# Patient Record
Sex: FEMALE | Race: WHITE | NOT HISPANIC OR LATINO | Employment: OTHER | URBAN - METROPOLITAN AREA
[De-identification: names, ages, dates, MRNs, and addresses within clinical notes are randomized per-mention and may not be internally consistent; named-entity substitution may affect disease eponyms.]

---

## 2017-01-03 ENCOUNTER — ALLSCRIPTS OFFICE VISIT (OUTPATIENT)
Dept: OTHER | Facility: OTHER | Age: 82
End: 2017-01-03

## 2017-03-30 ENCOUNTER — ALLSCRIPTS OFFICE VISIT (OUTPATIENT)
Dept: OTHER | Facility: OTHER | Age: 82
End: 2017-03-30

## 2017-09-13 ENCOUNTER — ALLSCRIPTS OFFICE VISIT (OUTPATIENT)
Dept: OTHER | Facility: OTHER | Age: 82
End: 2017-09-13

## 2017-10-06 ENCOUNTER — GENERIC CONVERSION - ENCOUNTER (OUTPATIENT)
Dept: OTHER | Facility: OTHER | Age: 82
End: 2017-10-06

## 2018-01-10 NOTE — PSYCH
History of Present Illness  [facility] Clinical Progress Note [facility]:   Specialized Services Documentation - Therapist must complete separate progress note for each specific clinical activity in which the client participated during the day  (611) Group Psychotherapy: (1220-7079) Catarino Gregg participated only during the introductory phase of psychotherapy group focused on coping  Although she seemed to be actively listening, she did not join in peer discussions  Inconsistent progress towards goal  continue group to offer opportunity to relate to peers around similar issues  Treatment Plan Problem(s): 1 1  Dwayne Nieves LCSW     (123) Group Psychotherapy: (6158-9120) The patient attended the group on the use of mindfulness to better handle her depression and anxiety  She was attentive and will benefit from sharing in the group process  Jackalyn Epley LCSW/[facility]  Treatment Plan Problem(s): 1 1        (564) Education Therapy Goals set - Grocery shop    Treatment Plan Problem(s): 1 1  Education Therapy Time - 0900 - 0930 Previous goal was met  Readiness to Learning:  She is receptive to learning  There are  no barriers to learning  Learning Assessment Time - 1330 - 1400   Education completed on  wellness tools  The teaching method was  demonstration  Shared area of learning: Yes  Dwayne Nieves LCSW     (866) Allied Therapy [id number] Catarino Gregg actively shared in [facility] group focused on stress management and relaxation techniques  Engaged in therapist led exercises and shared benefit  During discussion related to wellness strategies to reduce stress, patient identified benefit from relaxation exercises and ability to work on improving nutrition  Group reinforced consistent skill practice  Some effort noted toward goal  Continue AT to increase skill awareness and use  Treatment Plan Problem(s): 1 1   WERNER MillsBC       Case Management Note:   [id number] Briefly spoke with patient regarding progress  She reported feeling better since Monday, yet she struggles with feeling her children do "too much" for her and she is afraid this is holding her back  She is communicating this with them and she continues to need reassurance that she is working on her wellness  She would like to drive again and reach out to friends - encouraged to identify ways to reincorporate  TREATMENT SESSION NUMBER: 8   Current suicide risk is low  Medications not changed/added/denied  Tammy Solano MT-BC      Active Problems    1  YANCY (generalized anxiety disorder) (300 02) (F41 1)   2  Hyperlipidemia (272 4) (E78 5)   3  Hypertension (401 9) (I10)   4  Major depressive disorder, single episode, 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)    Allergies    1  Codeine Derivatives   2  Demerol TABS   3  Sulfa Drugs   4  TraMADol HCl TABS    Current Meds   1  Hydrochlorothiazide 12 5 MG Oral Capsule; TAKE 1 CAPSULE DAILY; Therapy: (Recorded:21Mar2016) to Recorded   2  KlonoPIN 0 5 MG Oral Tablet; TAKE 0 5 TABLET Twice daily; Therapy: (Deleta Terrence) to Recorded   3  Lisinopril 10 MG Oral Tablet; TAKE 1 TABLET DAILY AS DIRECTED; Therapy: (Deleta Terrence) to Recorded   4  Paxil 20 MG Oral Tablet; TAKE 1 TABLET DAILY; Therapy: (Deleta Terrence) to Recorded   5  Zocor 20 MG Oral Tablet; TAKE 1 TABLET DAILY IN THE EVENING; Therapy: (Deleta Terrence) to Recorded    Family Psych History    1  Family history of depression (V17 0) (Z81 8)    Social History    · College graduate   ·    · Never a smoker   · No history of alcohol use   · Retired    Future Appointments    Date/Time Provider Specialty Site   03/31/2016 10:45 AM ALCIRA Kirk  Psychiatry [facility] PART[facility] HOSPITAL[facility]   04/01/2016 10:45 AM ALCIRA Kirk  Psychiatry [facility]ITAL[facility]   04/07/2016 01:30 PM ALCIRA Araujo   Psychiatry [facility]   05/10/2016 12:00 PM Susi Gillette MSW, LCSW Psychiatry [facility] THERAPISTS     Signatures   Electronically signed by : FABIO Hamilton; Mar 30 2016  2:25PM EST                       (Author)    Electronically signed by : Jeff Mireles LCSW; Mar 30 2016  3:07PM EST                       (Author)    Electronically signed by : LIZBET ClancyWLCSW; Mar 30 2016  4:21PM EST                       (Author)    Electronically signed by : FABIO Hamilton; Mar 31 2016  8:33AM EST                       (Author)

## 2018-01-10 NOTE — PSYCH
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)    Current Meds   1  ClonazePAM 0 5 MG Oral Tablet (KlonoPIN); Take 1 tab po twice daily prn anxiety; Last   Rx:28Jun2016 Ordered   2  Hydrochlorothiazide 12 5 MG Oral Capsule; TAKE 1 CAPSULE DAILY; Therapy: (Macel Knee) to Recorded   3  Lisinopril 10 MG Oral Tablet; TAKE 1 TABLET DAILY AS DIRECTED; Therapy: (Macel Knee) to Recorded   4  PARoxetine HCl - 20 MG Oral Tablet (Paxil); TAKE 1 TABLET DAILY  Requested for:   07Apr2016; Last Rx:07Apr2016 Ordered   5  Zocor 20 MG Oral Tablet (Simvastatin); TAKE 1 TABLET DAILY IN THE EVENING; Therapy: (Recorded:21Mar2016) to Recorded    Allergies    1  Codeine Derivatives   2  Demerol TABS   3  Sulfa Drugs   4   TraMADol HCl TABS    Signatures   Electronically signed by : Ponce Dancer, MSWLCSWMSRADHA,REBECAW; Aug  8 2016 10:15AM EST                       (Author)

## 2018-01-10 NOTE — MISCELLANEOUS
Message  [facility] PMD Dr Bernard Brar faxed me a copy of the result of the CBC he ordered  Test samy 9/22/2017 and was significant for WBC 2 9 and absolute lymphocytes 684, otherwise normal CBC with diff  I contacted her on home phone # of record and she stated she is aware of the result from PMD who told her he would repeat the test in 3 months  She states she started Paroxetine during a hospital admission in mid March of 2016  Pt had a normal CBC 3/21/2016  I discussed that it is probably not the Paroxetine causing the reduced WBC but will be cautious and watchful  I stated that if she feels well enough to try, I can reduce the Paroxetine  Pt stated she felt well and ready for a reduction in dose  She will halve her 20mg tab daily and call if any problems  I discussed red flag signs and Sxs of low white count and what to do if those Sxs occurred--(seek medical attention right away ) She verbalized understanding and acceptance of the above  Her next medication review is sched for 1/15/2018  Plan  Major depression in remission    · From  PARoxetine HCl - 20 MG Oral Tablet TAKE 1 TABLET DAILY To  PARoxetine HCl - 20 MG Oral Tablet (Paxil) TAKE 1/2 TABLET DAILY  No new Rx    Pt was only instructed to reduce the dose    Signatures   Electronically signed by : SIMEON Jhaveri; Oct  6 2017  4:33PM EST                       (Author)

## 2018-01-11 NOTE — PSYCH
Date of Initial Treatment Plan: 5/23/16  Date of Current Treatment Plan: 5/23/16  Treatment Plan 1  Strengths/Personal Resources for Self Care: "I am a caring person  I am dependable " I am loyal to my friends  "  Diagnosis:   Axis I: F41 1, F32 0   Axis II: HTN     Current Challenges/Problems/Needs: "I am caring, very loyal to my friends  I am dependable  I try to find good in somebody "  Long Term Goals:   #1 I want to be more confident in (larger) group situations  Target Date: 9/231/6      #2 I want to resume feeling comfortable with what I enjoy doing  Target Date: 9/23/16           Short Term Objectives:   Goal 1:   A  I want to identify/review my qualities/strengths/assets  B  I want to review times I have felt confident and what I can learn about myself (strengths) from those experiences  C  I will identify/review affirmations  Target Date: 9/23/16      Goal 2:   A  I want to feel more comfortable socializing with friends  B  I want to resume participating in activities I enjoy (ex , volunteer work)  C  I want to rebuild my independence (ex , driving)  D  I want to continue to be open to learning from my experiences with my behavioral health event  Target Date: 9/23/16          GOAL 1: Modality: Individual 2 x per month Target Date: 9/23/16         GOAL 2: Modality: Individual 2 x per month Target Date: 9/23/16                The first scheduled review date is 9/23/16  The expected length of service is 12 months  Level of functioning at initial assessment: 45  The highest level of functioning in the past year was unknown  The current level of functioning is 65             Patient Signature: _________________________________ Date/Time: ______________       Electronically signed by : Daryle Bottoms, MSWLCSWMSW,LCSW; May 23 2016  6:02PM EST                       (Author)    Electronically signed by : Daryle Bottoms, MSWLCSWMSW,LCSW; May 23 2016 6:12PM EST                       (Author)

## 2018-01-11 NOTE — PSYCH
History of Present Illness  [facility] Clinical Progress Note [facility]:   Specialized Services Documentation - Therapist must complete separate progress note for each specific clinical activity in which the client participated during the day  (064) Group Psychotherapy: 5661-1622 Reggie Spatz participated in wellness group focusing on educating, and practicing assertive communication techniques and understanding the differences between aggressive, passive and assertive communication styles  Reggie Spatz shared that she was "mostly passive" in communication and shared with peers how she feels angry with one daughter-in-law and one adult grandchild who she feels takes advantage of her regarding presents  Peers discussed how patient could be more assertive with these family members to decrease resentment and anger but first she would have to realize what it was she wants from them  Pt made moderate progress toward goal  Continue group to build individual self-confidence in utilizing assertive communications in daily life  (382) Group Psychotherapy: (5850-7855) Reggie Spatz participated only during the introductory phase of psychotherapy group focused on recovery  At times in group it was difficult for this writer to ascertain if she was actively listening as her eye contact was poor  When asked if she shared a similar perception held by a peer, she said she did, but without further elaboration  Inconsistent progress towards goal  continue group to offer opportunity to relate to peers around similar issues  Treatment Plan Problem(s): 1 1  Illa Bloch, LCSW       (983) Education Therapy Goals set - make dinner for her family    Treatment Plan Problem(s): 1 2  Education Therapy Time - 0900 - 0930 Previous goal was met  Readiness to Learning:  She is receptive to learning  There are  no barriers to learning  Learning Assessment Time - 1330 - 1400   Education completed on  illness and wellness tools     The teaching method was  verbal  Shared area of learning: Yes  WERNER MillsBC     (571) Allied Therapy 3417-3319 Catarino Gregg actively shared in [facility] group exploring emotions  Engaged in task sharing triggers and responses to given emotions  She identified both productive and unproductive choices with several âupsettingâ emotions  She identified unproductive habits and was able to use driving last night as an example of turning worry into positive action  Patient took notes and was engaged  Group reinforced role in identifying emotional âhabitsâ and reactions with ability to practice productive choices and supports  Some positive progress noted toward goal  Continue AT to explore healthy emotional regulation and role in practicing wellness tools  Treatment Plan Problem(s): 1 1, 1 2  WERNER MillsBC       Case Management Note:   3724-2829 Catarino Gregg met with CM to discuss updated treatment plan  Catarino Gregg signed and received a copy of TP  Pt stated that she is trying to resume driving short distances again and yesterday drove to food store to buy some items she likes as well as ingredients to make dinner tonight for her son and daughter-in-law that she is currently living with  Pt also shared that she had a short discussion with her son that he does not have to do "everything for me " Catarino Gregg stated her son will be returning to work next week and she would like to be more active and independent, rather than dependent on him and her daughter-in-law  Pt also asked if it is "normal" to have problems remembering and focusing with depression and anxiety  Catarino Gregg stated that she is reading handouts and other [facility] materials but is still experiencing problems retaining information  Pt wanted to know "will this get better?"   TREATMENT SESSION NUMBER: 9   Current suicide risk is low  Medications not changed/added/denied  Cr Thorpe, RN      Active Problems    1  YANCY (generalized anxiety disorder) (300 02) (F41 1)   2  Hyperlipidemia (272 4) (E78 5)   3  Hypertension (401 9) (I10)   4  Major depressive disorder, single episode, 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)    Allergies    1  Codeine Derivatives   2  Demerol TABS   3  Sulfa Drugs   4  TraMADol HCl TABS    Current Meds   1  Hydrochlorothiazide 12 5 MG Oral Capsule; TAKE 1 CAPSULE DAILY; Therapy: (Recorded:21Mar2016) to Recorded   2  KlonoPIN 0 5 MG Oral Tablet; TAKE 0 5 TABLET Twice daily; Therapy: (Marcia Saleh) to Recorded   3  Lisinopril 10 MG Oral Tablet; TAKE 1 TABLET DAILY AS DIRECTED; Therapy: (Marcia Saleh) to Recorded   4  Paxil 20 MG Oral Tablet; TAKE 1 TABLET DAILY; Therapy: (Marcia Saleh) to Recorded   5  Zocor 20 MG Oral Tablet; TAKE 1 TABLET DAILY IN THE EVENING; Therapy: (Marcia Saleh) to Recorded    Family Psych History    1  Family history of depression (V17 0) (Z81 8)    Social History    · College graduate   ·    · Never a smoker   · No history of alcohol use   · Retired    Future Appointments    Date/Time Provider Specialty Site   04/01/2016 10:45 AM ALCIRA Evangelista  Psychiatry [facility] PARTIAL HOSPITALIZATION   04/07/2016 01:30 PM ALCIRA Combs  Psychiatry [facility]   05/10/2016 12:00 PM MOODY Madrid, [facility] Psychiatry [facility]     Signatures   Electronically signed by : Christa De Leon RN; Mar 31 2016  2:33PM EST                       (Author)    Electronically signed by : FABIO Ambrose; Mar 31 2016  2:45PM EST                       (Author)    Electronically signed by : Olga Villa LCSW; Mar 31 2016  2:55PM EST                       (Author)    Electronically signed by : Christa De Leon RN; Apr 4 2016  2:47PM EST                       (Author)    Electronically signed by : Christa De Leon RN;  Apr 5 2016  3:20PM EST                       (Author)

## 2018-01-11 NOTE — PSYCH
Risk Assessment    The following ratings are based on my interview(s) with initial evaluation  Risk of Harm to Self:   Demographic risk factors include , alaskan, or native [place], never  or  status and Hinduism  Recent Specific Risk Factors include: passive death wishes, feelings of guilt or self blame, recent losses: ex-, worries about finances or work and diagnosis of depression  These risk factors presented within the last few months  Risk of Harm to Others:   Access to Weapons: The patient has access to the following weapons: Pt states there are no guns in the home of her son where she now lives  Based on the above information, the client presents the following risk of harm to self or others: low  The following interventions are recommended: referral to   Notes regarding this Risk Assessment: [employer] [facility]  Active Problems    1  YANCY (generalized anxiety disorder) (300 02) (F41 1)   2  Hyperlipidemia (272 4) (E78 5)   3  Hypertension (401 9) (I10)   4  Major depressive disorder, single episode, 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)    Future Appointments    Date/Time Provider Specialty Site   03/22/2016 10:00 AM ALCIRA De La Cruz  Psychiatry [facility]   03/23/2016 10:00 AM ALCIRA De La Cruz  [facility]   03/24/2016 10:00 AM ALCIRA De La Cruz  [facility]iatry [facility]   03/25/2016 10:00 AM ALCIRA De La Cruz  [facility]   04/07/2016 01:30 PM ALCIRA Tavares   Psychiatry [facility]     Signatures   Electronically signed by : Niko Apodaca RN; Mar 21 2016  2:50PM EST                       (Author)

## 2018-01-11 NOTE — PSYCH
Progress Note  Psychotherapy Provided [facility]: Individual Psychotherapy 45 mins  minutes provided today  Goals addressed in session:   (G#2 ) "I have been busy   still working in the garden "  has been baking;  is "volunteering a lot (at [employer]);"  has been spending time with one of her grandson's prior to his plans to relocate;  has been conversing with her friends via telephone as well as via her volunteer activities; edna has prescribed Klonopin and is taking 0 25 mg  in AM and 0 25 mg  in the evening; edna takes the Paxil 20 mg  in AM and offers no complaints about her medications at this time; "I feel like my normal self " She  has been enjoying her crossword puzzles and her reading  states, "I am still having trouble with driving (other than [address] [facility] area)" which she  is working on; discussed/reviewed the factors involving her behavioral health event and the process of her achieving her current status of emotional well-being (her progress); discussed the status of her counseling with the longer range plan to keep her next ind  counseling appointment with the option to cancel if she maintains her current level of functioning; A: presents as having some insight and understanding of ways to improve/build upon her quality of life at this time (with the main change of her living arrangements; P: (G# 2) will continue with her self-care strategies (ex , socializing with friends) and contingent on her progress within the next month will determine to keep or cancel her next scheduled ind  counseling appointment;   Pain Scale and Suicide Risk [facility]: On a scale of 0 to 10, the patient rates current pain at 0   Current suicide risk is low   Behavioral Health Treatment Plan [facility]: Diagnosis and Treatment Plan explained to patient, patient relates understanding diagnosis and is agreeable to Treatment Plan  Results/Data  PHQ-9 Adult Depression Screening [id number] 11:15AM Geetha Lange     Test Name Result Flag Reference   PHQ-9 Adult Depression Score 0     Q1: 0, Q2: 0, Q3: 0, Q4: 0, Q5: 0, Q6: 0, Q7: 0, Q8: 0, Q9: 0   PHQ-9 Adult Depression Screening Negative     PHQ-9 Difficulty Level Not difficult at all     PHQ-9 Severity No Depression       PHQ-9 Adult Depression Screening [id number] 11:15AM Denny Soo Lanza pankaj     Test Name Result Flag Reference   PHQ-9 Adult Depression Score 0     Q1: 0, Q2: 0, Q3: 0, Q4: 0, Q5: 0, Q6: 0, Q7: 0, Q8: 0, Q9: 0   PHQ-9 Adult Depression Screening Negative     PHQ-9 Difficulty Level Not difficult at all     PHQ-9 Severity No Depression         Assessment    1  YANCY (generalized anxiety disorder) (300 02) (F41 1)   2   Major depressive disorder, single episode, 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

## 2018-01-11 NOTE — PSYCH
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)   2  YANCY (generalized anxiety disorder) (300 02) (F41 1)    Plan    1  From  KlonoPIN 0 5 MG Oral Tablet TAKE 0 5 TABLET Twice daily To   ClonazePAM 0 5 MG Oral Tablet (KlonoPIN) TAKE 0 5 TABLET Twice daily    2  PARoxetine HCl - 20 MG Oral Tablet (Paxil); TAKE 1 TABLET DAILY    Chief Complaint  I think this was coming for a long time  History of Present Illness  68 yr old woman who became increasingly depressed and then moved out of her [employer] rise  She became  anxious and began shopping for many people at [employer]  She inherited a lot of money when her ex    Pt had the idea to move from the [facility] to her own apartment closeby  Pt then began to feel very anxious and felt unsafe in the new apartment  Pt stayed with her son while she was waiting to move to apartment  Family doctor prescibed celexa and xanax and did not work  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    Family agreed that pt should move to the apartment to get some peace and quiet  Pt blames herself for moving to the single apartment and blames herself for making a mistake  We discussed the need for the patient to be less critical of herself and be more self-accepting  Pt was quiete nervous initially but relaxed as the interview continued  She does have mild tremor of her hands but this decreased after talking for awhile  Pt is positive and has a bright affect and congruent mood  She is alert and oriented x3 and her memory appears to be intact  Review of Systems  anxiety  Constitutional: No fever, no chills, no recent weight gain or recent weight loss  ENT: no ear ache, no loss of hearing, no nosebleeds or nasal discharge, no sore throat or hoarseness  Cardiovascular: no complaints of slow or fast heart rate, no chest pain, no palpitations, no leg claudication or lower extremity edema     Respiratory: no complaints of shortness of breath, no wheezing, no dyspnea on exertion, no orthopnea or PND  Gastrointestinal: no complaints of abdominal pain, no constipation, no nausea or diarrhea, no vomiting, no bloody stools  Genitourinary: no complaints of dysuria, no incontinence, no pelvic pain, no dysmenorrhea, no vaginal discharge or abnormal vaginal bleeding  Musculoskeletal: no complaints of arthralgia, no myalgia, no joint swelling or stiffness, no limb pain or swelling  Integumentary: no complaints of skin rash or lesion, no itching or dry skin, no skin wounds  ROS reviewed  Past Psychiatric History    Past Psychiatric History: Pt has had only one psychiatric inpt hospitalization in March of this year for 6 days on the [facility] inpt psyc unit and was seen by Dr Lennon Skiff over one week  She responded will to Paxil 20 mg po daily and Klonopin 0 25mg po bid  She is now lividn with her son and doing quite well   Following her inpt hospitalization, She attended the [facility] her and again did well  Meds were not changed  She finished the program just 2 days ago and I am seeing her in follow up  Substance Abuse Hx    Substance Abuse History: None  Active Problems    1  YANCY (generalized anxiety disorder) (300 02) (F41 1)   2  Hyperlipidemia (272 4) (E78 5)   3  Hypertension (401 9) (I10)   4  Major depressive disorder, single episode, 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)    Past Medical History    The active problems and past medical history were reviewed and updated today  Surgical History    The surgical history was reviewed and updated today  Allergies    1  Codeine Derivatives   2  Demerol TABS   3  Sulfa Drugs   4  TraMADol HCl TABS    Current Meds   1  Hydrochlorothiazide 12 5 MG Oral Capsule; TAKE 1 CAPSULE DAILY; Therapy: (Recorded:21Mar2016) to Recorded   2  KlonoPIN 0 5 MG Oral Tablet; TAKE 0 5 TABLET Twice daily; Therapy: ([facility]) to Recorded   3   Lisinopril 10 MG Oral Tablet; TAKE 1 TABLET DAILY AS DIRECTED; Therapy: ([facility]el[facility]) to Recorded   4  Paxil 20 MG Oral Tablet; TAKE 1 TABLET DAILY; Therapy: ([facility]) to Recorded   5  Zocor 20 MG Oral Tablet; TAKE 1 TABLET DAILY IN THE EVENING; Therapy: (Recorded:2016) to Recorded    The medication list was reviewed and updated today  Family Psych History    1  Family history of depression (V17 0) (Z81 8)    The family history was reviewed and updated today  Social History    · College graduate   ·    · Never a smoker   · No history of alcohol use   · Retired  The social history was reviewed and updated today  Pt finished college at [employer] and she did get a degree as an RN   Then son  the next month at age 26  she is presently living withone of her sons   Her family is very supportive  History Of Phys/Sex Abuse Or Perpetration    History Of Phys/Sex Abuse or Perpetration: none  Vitals  Signs [Data Includes: Current Encounter]   Recorded: [id number] 02:58PM   Heart Rate: 70  Respiration: 16  Systolic: 515  Diastolic: 80  Height: 5 ft 3 in  Weight: 146 lb   BMI Calculated: 25 86  BSA Calculated: 1 69    Physical Exam    Appearance: was calm and cooperative and good eye contact  Observed mood: anxious, but mood appropriate  Observed mood: affect appropriate  Speech: a normal rate  Thought processes: coherent/organized  Hallucinations: no hallucinations present  Thought Content: no delusions  Abnormal Thoughts: The patient has no suicidal thoughts and no homicidal thoughts  Orientation: The patient is oriented to person, place and time, oriented to person and oriented to place  Recent and Remote Memory: short term memory intact and long term memory intact  Attention Span And Concentration: concentration intact  Insight: Insight intact  Judgment: Her judgment was intact  Muscle Strength And Tone  Muscle strength and tone were normal  Normal gait and station  Language: no difficulty naming common objects, no difficulty repeating a phrase and no difficulty writing a sentence  Fund of knowledge: Patient displays adequate knowledge of current events, adequate fund of knowledge regarding past history and adequate fund of knowledge regarding vocabulary  The patient is experiencing no localized pain  On a scale of 0 - 10 the pain severity is a 0  Initial Evaluation provided today  Goals addressed in session: Pt will work on being less self-critical and participate in activities that are relaxing  Treatment Recommendations: continue Klonopin  0 25mg po bid and Paxil 20 mg po daily  Risks, Benefits And Possible Side Effects Of Medications: Risks, benefits, and possible side effects of medications explained to patient and patient verbalizes understanding  End of Encounter Meds    1  ClonazePAM 0 5 MG Oral Tablet (KlonoPIN); TAKE 0 5 TABLET Twice daily; Last   Rx:07Apr2016 Ordered    2  Zocor 20 MG Oral Tablet (Simvastatin); TAKE 1 TABLET DAILY IN THE EVENING; Therapy: (Juanita Lang) to Recorded    3  Hydrochlorothiazide 12 5 MG Oral Capsule; TAKE 1 CAPSULE DAILY; Therapy: ([facility]) to Recorded   4  Lisinopril 10 MG Oral Tablet; TAKE 1 TABLET DAILY AS DIRECTED; Therapy: ([facility]) to Recorded    5   PARoxetine HCl - 20 MG Oral Tablet (Paxil); TAKE 1 TABLET DAILY  Requested for:   07Apr2016; Last Rx:07Apr2016 Ordered    Future Appointments    Date/Time Provider Specialty Site   05/10/2016 12:00 PM Chencho Dupree MSW, [facility]     Signatures   Electronically signed by : ALCIRA Dozier ; Apr 7 2016  3:03PM EST                       (Author)

## 2018-01-11 NOTE — PSYCH
History of Present Illness  [facility] Clinical Progress Note [facility]:   Specialized Services Documentation - Therapist must complete separate progress note for each specific clinical activity in which the client participated during the day  (279) Group Psychotherapy: [id number] Elmon Spatz participated in wellness group focusing on educating, and practicing assertive communication techniques and understanding the differences between aggressive, passive and assertive communication styles  Elmon Spatz shared that she is more comfortable being assertive in certain life circumstances than others  Peers offered encouragement and support to pt to discuss one life circumstance where it was difficult for her to be assertive and she gave the example of communicating to her son and daughter-in-law that she wanted to do "as much as I can to remain independent as I have been because I have always been independent" despite their many attempts "help " Pt stated that she is very grateful to have such a supportive and loving family but needed to let them know that she does not want them to do things for her that she can do for herself  Pt made good progress toward goal  D/C today  Treatment Plan Problem(s): 1 1,1 2  Justin Geller RN     (957) Group Psychotherapy: ([id number]) Elmon Spatz briefly participated during psychotherapy group focused on stressors  She identified with another peer who reported experiencing forgetfulness and being overwhelmed due to stress overload  Elmon Spatz reported since she began program, she has been less anxious and has been resuming previously enjoyed activities and tasks without excessive worry  She has become aware of how certain thoughts can fuel her worry and anxiety and she has been practicing techniques to steer away from dwelling on these types of thoughts  Mod  progress towards goal  Discharge today  Treatment Plan Problem(s): 1 1   Joel Soares LCSW       (895) Education Therapy Goals set - PHQ-9 was a 13 upon admission and a 5 upon discharge    Treatment Plan Problem(s): 1 0    Education Therapy Time - 0900 - 0930 Previous goal was met  Readiness to Learning:  She is receptive to learning  There are  no barriers to learning  Learning Assessment Time - 1330 - 1400   Education completed on  illness, medication, aftercare and wellness tools  The teaching method was  verbal and written  Shared area of learning: Yes  FABIO Deng     (607) Allied Therapy [id number] Tosha Riley actively shared in [facility] group focused on barriers to communication  Engaged in tasks exploring what makes it difficult to share and strategies to improve with supports  She shared barriers that prevent people from sharing with others but spoke in vague terms  Group reinforced personal role in sharing needs and âIâ statements  Some exploration of goal explored  Discharge at the end of treatment day  Treatment Plan Problem(s): 1 1  FABIO Deng       Case Management Note:   [id number] Tosha Riley reviewed Relapse Prevention Plan, D/C Instructions, Medications List and received copies of all  CM discussed JACK HENDRICKS [facility] Outpatient Support Group for Depression with Tosha Riley and gave her a handout of information, including dates for the group  CM took Tosha Riley to waiting room where she would enter for OP visits as she stated she was anxious about seeing an OP psychiatrist and discussed how her general anxiety has lessened but she continues to fear the unknown  Pt will also follow up with OP therapist at [address] [place] [address] and has all paperwork completed to bring with her to first OP visit  D/C today  TREATMENT SESSION NUMBER: 12   Current suicide risk is low  Medications not changed/added/denied  Consuelo Foote RN   [facility] Follow-up Physician's Orders:   4/5/2016  8:31 AM Discharge Today   MD Consuelo Mathew RN      Active Problems    1  YANCY (generalized anxiety disorder) (300 02) (F41 1)   2   Hyperlipidemia (272  4) (E78 5)   3  Hypertension (401 9) (I10)   4  Major depressive disorder, single episode, 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)    Allergies    1  Codeine Derivatives   2  Demerol TABS   3  Sulfa Drugs   4  TraMADol HCl TABS    Current Meds   1  Hydrochlorothiazide 12 5 MG Oral Capsule; TAKE 1 CAPSULE DAILY; Therapy: (Recorded:21Mar2016) to Recorded   2  KlonoPIN 0 5 MG Oral Tablet; TAKE 0 5 TABLET Twice daily; Therapy: ([facility]ennifer [facility]ing) to Recorded   3  Lisinopril 10 MG Oral Tablet; TAKE 1 TABLET DAILY AS DIRECTED; Therapy: (Jennifer Schooling) to Recorded   4  Paxil 20 MG Oral Tablet; TAKE 1 TABLET DAILY; Therapy: ([facility]enn[facility]) to Recorded   5  Zocor 20 MG Oral Tablet; TAKE 1 TABLET DAILY IN THE EVENING; Therapy: ([facility]enn[facility]) to Recorded    Family Psych History    1  Family history of depression (V17 0) (Z81 8)    Social History    · College graduate   ·    · Never a smoker   · No history of alcohol use   · Retired    Future Appointments    Date/Time Provider Specialty Site   04/05/2016 10:45 AM ALCIRA Rodriguez  Psychiatry [facility]IAL H[facility]PITAL[facility]   04/07/2016 01:30 PM ALCIRA Pearce  Psychiatry [facility]   05/10/2016 12:00 PM Elyssa Estrada MSW, [facility] Psychiatry [facility]     Signatures   Electronically signed by : ALCIRA Thomson ; Apr 5 2016  8:32AM EST                       (Author)    Electronically signed by : Meenu Alcocer RN; Apr 5 2016 11:52AM EST                       (Author)    Electronically signed by : Meenu Alcocer RN; Apr 5 2016  2:27PM EST                       (Author)    Electronically signed by : FABIO Hamilton; Apr 5 2016  2:28PM EST                       (Author)    Electronically signed by : Meenu Alcocer RN;  Apr 5 2016  3:18PM EST                       (Author)    Electronically signed by : Jeff Mireles LCSW; Apr 5 2016  3:34PM EST                       (Author) Electronically signed by : Shannon Gold LCSW; Apr 5 2016  3:35PM EST                       (Author)

## 2018-01-11 NOTE — PSYCH
Psych Med Mgmt    Appearance: was calm and cooperative and good eye contact  Observed mood: mood appropriate  Observed mood: affect appropriate  Speech: a normal rate  Thought processes: coherent/organized  Hallucinations: no hallucinations present  Thought Content: no delusions  Abnormal Thoughts: The patient has no suicidal thoughts and no homicidal thoughts  Orientation: The patient is oriented to person, place and time, oriented to person and oriented to place  Recent and Remote Memory: short term memory intact and long term memory intact  Attention Span And Concentration: concentration intact  Insight: Insight intact  Judgment: Her judgment was intact  Muscle Strength And Tone  Muscle strength and tone were normal  Normal gait and station  Language: no difficulty naming common objects, no difficulty repeating a phrase and no difficulty writing a sentence  Fund of knowledge: Patient displays adequate knowledge of current events, adequate fund of knowledge regarding past history and adequate fund of knowledge regarding vocabulary  The patient is experiencing no localized pain  On a scale of 0 - 10 the pain severity is a 0  Individual Psychotherapy minutes provided today  Goals addressed in session: Pt will work on being less self-critical and participate in activities that are relaxing  Treatment Recommendations: continue Klonopin  0 25mg po bid and Paxil 20 mg po daily  Risks, Benefits And Possible Side Effects Of Medications: Risks, benefits, and possible side effects of medications explained to patient and patient verbalizes understanding  She reports normal appetite, normal energy level, no weight change and normal number of sleep hours  80 yr old female who presented with depress        Vitals  Signs [Data Includes: Current Encounter]   Recorded: [id number] 09:51AM   Heart Rate: 80  Respiration: 16  Systolic: 552  Diastolic: 80  Height: 5 ft   Weight: 146 lb 3 oz  BMI Calculated: 28 55  BSA Calculated: 1 63    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)   3  Hypertension (401 9) (I10)    Review of Systems  anxiety  Constitutional: No fever, no chills, no recent weight gain or recent weight loss  ENT: no ear ache, no loss of hearing, no nosebleeds or nasal discharge, no sore throat or hoarseness  Cardiovascular: no complaints of slow or fast heart rate, no chest pain, no palpitations, no leg claudication or lower extremity edema  Respiratory: no complaints of shortness of breath, no wheezing, no dyspnea on exertion, no orthopnea or PND  Gastrointestinal: no complaints of abdominal pain, no constipation, no nausea or diarrhea, no vomiting, no bloody stools  Genitourinary: no complaints of dysuria, no incontinence, no pelvic pain, no dysmenorrhea, no vaginal discharge or abnormal vaginal bleeding  Musculoskeletal: no complaints of arthralgia, no myalgia, no joint swelling or stiffness, no limb pain or swelling  Integumentary: no complaints of skin rash or lesion, no itching or dry skin, no skin wounds  ROS reviewed  Past Psychiatric History    Past Psychiatric History: Pt has had only one psychiatric inpt hospitalization in March of this year for 6 days on the [facility] inpt psyc unit and was seen by Dr Vielka Henley over one week  She responded will to Paxil 20 mg po daily and Klonopin 0 25mg po bid  She is now lividn with her son and doing quite well   Following her inpt hospitalization, She attended the [facility] her and again did well  Meds were not changed  She finished the program just 2 days ago and I am seeing her in follow up  Substance Abuse Hx    Substance Abuse History: None  Active Problems    1  YANCY (generalized anxiety disorder) (300 02) (F41 1)   2  Hyperlipidemia (272 4) (E78 5)   3   Hypertension (401 9) (I10)   4  Major depressive disorder, single episode, 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)    Past Medical History    The active problems and past medical history were reviewed and updated today  Surgical History    The surgical history was reviewed and updated today  Allergies    1  Codeine Derivatives   2  Demerol TABS   3  Sulfa Drugs   4  TraMADol HCl TABS    Current Meds   1  ClonazePAM 0 5 MG Oral Tablet; TAKE 0 5 TABLET Twice daily; Last Rx:2016   Ordered   2  Hydrochlorothiazide 12 5 MG Oral Capsule; TAKE 1 CAPSULE DAILY; Therapy: ([facility]) to Recorded   3  Lisinopril 10 MG Oral Tablet; TAKE 1 TABLET DAILY AS DIRECTED; Therapy: ([facility]) to Recorded   4  PARoxetine HCl - 20 MG Oral Tablet; TAKE 1 TABLET DAILY  Requested for: [id number];   Last Rx:2016 Ordered   5  Zocor 20 MG Oral Tablet; TAKE 1 TABLET DAILY IN THE EVENING; Therapy: (Recorded:2016) to Recorded    The medication list was reviewed and updated today  Family Psych History  Mother    1  Family history of depression (V17 0) (Z81 8)    The family history was reviewed and updated today  Social History    · College graduate   ·    · Never a smoker   · No history of alcohol use   · Retired  The social history was reviewed and updated today  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   Then son  the next month at age 26  she is presently living withone of her sons   Her family is very supportive  History Of Phys/Sex Abuse Or Perpetration    History Of Phys/Sex Abuse or Perpetration: none  End of Encounter Meds    1  ClonazePAM 0 5 MG Oral Tablet (KlonoPIN); TAKE 0 5 TABLET Twice daily; Last   Rx:2016 Ordered    2  Zocor 20 MG Oral Tablet (Simvastatin); TAKE 1 TABLET DAILY IN THE EVENING; Therapy: ([facility]) to Recorded    3  Hydrochlorothiazide 12 5 MG Oral Capsule; TAKE 1 CAPSULE DAILY; Therapy: ([facility]) to Recorded   4  Lisinopril 10 MG Oral Tablet; TAKE 1 TABLET DAILY AS DIRECTED; Therapy: (Andrade Rojo) to Recorded    5  PARoxetine HCl - 20 MG Oral Tablet (Paxil); TAKE 1 TABLET DAILY  Requested for:   07Apr2016; Last Rx:07Apr2016 Ordered    Future Appointments    Date/Time Provider Specialty Site   05/10/2016 12:00 PM [facility]     Chief Complaint  I think this was coming for a long time        Signatures   Electronically signed by : ALCIRA Adhikari ; Apr 28 2016 10:05AM EST                       (Author)

## 2018-01-12 NOTE — PSYCH
History of Present Illness    Pre-morbid level of function and History of Present Illness:    reports had had anxiety and depression and had decided to relocate to an apt  following a 14 yr  stay in sr  housing; following an 11-day stay at her apt  was admitted to [facility]; "I never had anything like this (depression/anxiety) before " She acknowledged, "I did too much (in the building with the residents)  " states her daus -in-law noticed "You were different at [place] time " states was not sleeping well among other reported symptoms;    Reason for evaluation and partial hospitalization as an alternative to inpatient hospitalization: N/A  Previous Psychiatric/psychological treatment/year: [facility] 3/11/16-3/17/16; [facility]  Outpatient and/or Partial and Other [employer] Used (CTT, ICM, VNA): Outpatient:   Family Constellation (include parents, relationship with each and pertinent Psych/Medical History): Mother: dec'd- yrs  ago: "I liked her  She  suddenly "    Spouse:  since ;  on 2/14/15   Father: dec'd-1983; "I liked my dad  He was very quiet  He loved to read "    Children: son-age 59-"good;"   Children: son-age 62   Other: son-age 54; The patient relates best to two daughters-in-law; sons;  She lives with lives with youngest son, Dennis Cordova  She does not live alone  Domestic Violence: No past history of domestic violence  The patient is not currently experiencing domestic violence  There is not suspected domestic violence  There is no history of child abuse  There is no history of sexual abuse  Additional Comments related to family/relationships/peer support: following her discharge from [address] [place] [address] went [place] of [place];" states is enjoying her current living arrangements; "I have my own space " [address] [facility]; "I have marvelous sons "     School or Work History (strengths/limitations/needs: earned an education degree-bachelors'; substituted; 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 [employer]; volunteers at [employer] and would like to resume her volunteer work;    Her highest grade level achieved was  four years of college   history includes none reported;  Financial status includes no financial stressors reported  LEISURE ASSESSMENT (Include past and present hobbies/interests and level of involvement (Ex: Group/Club Affiliations): reading; crossword puzzles; computer scrabble; walking solo; gardening (veggies and flowers); play cards;  Her primary language is  [place]  Preferred language is [place]  Ethnic considerations are ;  Religions affiliations and level of involvement - Liliana   Spirituality and sparkle have helped her cope with difficult situations in her life  FUNCTIONAL STATUS: There has been a recent change in the patient's ability to do the following:  She does not need [employer]  The patient learns best by  reading, listening and pictures  SUBSTANCE ABUSE ASSESSMENT: no current substance abuse and no past substance abuse  No previous detox/rehab treatment  HEALTH ASSESSMENT: She has not lost 10 lbs or more in the last 6 months without trying  She has not had decreased appetite for 5 days or more  She has gained 10 lbs or more in the last 6 months without trying  no nausea  no vomiting  no diarrhea  no referral to PCP needed  no referral to nutritionist needed  4 lbs  more recently; ave  145 lbs ;  no pregnancy  She is not receiving prenatal care  not referred to PCP  PCP notified  LEGAL: No Mental Health Advance Directive or Power of  on file  She does not want an information packet about Mental Health Advance Directives  Prenatal History: n/a  Delivery History: n/a  Developmental Milestones: n/a  Temperament as a teenager was n/a       The following ratings are based on my observation of this patient over the last interview  Risk of Harm to Self:   Demographic risk factors include , alaskan, or native [place]  Historical Risk Factors include: in March, 2016, reported SI, briefly;  Recent Specific Risk Factors include: diagnosis of depression  These risk factors presented within the last few months  Risk of Harm to Others:   Based on the above information, the client presents the following risk of harm to self or others: low  The following interventions are recommended: no intervention changes  Notes regarding this Risk Assessment: reports no access to weapons;       Review of Systems  depression and emotional problems/concerns, but no anxiety, no euphoria, no emotional lability, no hostility, not suidical, no compulsive behavior, no impulsive behavior, no unusual behavior, no violent behavior, no disturbing or unusual thoughts, feelings, or sensations, no unreasonable or irrational fears, no magical thinking, not having fantasies, no interpersonal relationship problems, no sleep disturbances, normal functioning ability, no personality change and no character deficiency  Constitutional: recent 4 bs  lb weight gain, but no fever, not feeling poorly, no chills, not feeling tired and no recent weight loss  Eyes: no eye pain, no eyesight problems, no dryness of the eyes, eyes not red, no purulent discharge from the eyes and no itching of the eyes  ENT: no earache, no nosebleeds, no sore throat, no hearing loss, no nasal discharge and no hoarseness  Cardiovascular: the heart rate was not slow, no chest pain, no intermittent leg claudication, the heart rate was not fast, no palpitations and no lower extremity edema  Respiratory: no shortness of breath, no cough, no orthopnea, no wheezing, no shortness of breath during exertion and no PND  Gastrointestinal: no abdominal pain, no nausea, no vomiting, no constipation, no diarrhea and no blood in stools     Genitourinary: no dysuria, no pelvic pain, no vaginal discharge, no incontinence, no dysmenorrhea and no unexplained vaginal bleeding  Musculoskeletal: no arthralgias, no joint swelling, no limb pain, no myalgias, no joint stiffness and no limb swelling  Integumentary: no rashes, no itching, no breast pain, no skin lesions, no skin wound and no breast lump  Neurological: no headache, no numbness, no tingling, no confusion, no dizziness, no limb weakness, no convulsions, no fainting and no difficulty walking  Psychiatric: depression and emotional problems, but not suicidal, no anxiety, no personality change and no sleep disturbances  Endocrine: no proptosis, no muscle weakness, no hot flashes and no deepening of the voice  no feelings of weakness   Hematologic/Lymphatic: no swollen glands, no tendency for easy bleeding, no tendency for easy bruising and no swollen glands in the neck  ROS reviewed  Active Problems    1  YANCY (generalized anxiety disorder) (300 02) (F41 1)   2  Hyperlipidemia (272 4) (E78 5)   3  Hypertension (401 9) (I10)   4  Major depressive disorder, single episode, 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)    Past Medical History    The active problems and past medical history were reviewed and updated today  Past Psychiatric History    Past Psychiatric History: no previous behavioral health issues reported; states mother experienced depression (one month stay b h  inpt ) following open heart surgery;  Surgical History    The surgical history was reviewed and updated today  Family Psych History  Mother    1  Family history of depression (V17 0) (Z81 8)    The family history was reviewed and updated today  Substance Abuse Hx    Substance Abuse History: reports no hx or current concerns with substance abuse; reports no family hx of substance abuse;            Social History    · College graduate   ·    · Never a smoker   · No history of alcohol use   · Retired  The social history was reviewed and updated today  The social history was reviewed and is unchanged  Current Meds   1  ClonazePAM 0 5 MG Oral Tablet; TAKE 0 5 TABLET Twice daily; Last Rx:07Apr2016   Ordered   2  Hydrochlorothiazide 12 5 MG Oral Capsule; TAKE 1 CAPSULE DAILY; Therapy: (Ulis Cage) to Recorded   3  Lisinopril 10 MG Oral Tablet; TAKE 1 TABLET DAILY AS DIRECTED; Therapy: (Ulis Cage) to Recorded   4  PARoxetine HCl - 20 MG Oral Tablet; TAKE 1 TABLET DAILY  Requested for: [id number];   Last Rx:07Apr2016 Ordered   5  Zocor 20 MG Oral Tablet; TAKE 1 TABLET DAILY IN THE EVENING; Therapy: (Recorded:21Mar2016) to Recorded    The medication list was reviewed and updated today  Allergies    1  Codeine Derivatives   2  Demerol TABS   3  Sulfa Drugs   4  TraMADol HCl TABS    Physical Exam    Appearance: was calm and cooperative, adequate hygiene and grooming and good eye contact  Observed mood: depressed, but mood appropriate  Observed mood: affect appropriate  Speech: a normal rate  Thought processes: coherent/organized  Hallucinations: no hallucinations present  Thought Content: no delusions  Abnormal Thoughts: The patient has no suicidal thoughts  Orientation: The patient is oriented to person, place and time  Recent and Remote Memory: short term memory intact and long term memory intact  Attention Span And Concentration: concentration intact  Judgment: Her judgment was intact  On a scale of 0 - 10 the pain severity is a 0  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)    Future Appointments    Date/Time Provider Specialty Site   06/28/2016 09:00 AM ALCIRA Kendrick   Psychiatry [facility]   05/23/2016 05:00 PM MOODY Sparrow, [facility] Psychiatry [facility]   07/18/2016 11:00 AM MOODY Sparrow, [facility]   07/28/2016 02:00 PM MOODY Cruz LCSW Psychiatry [facility]   08/10/2016 09:00 AM MOODY Cruz, [facility] Psychiatry [facility]     Signatures   Electronically signed by : Daryle Bottoms, MSWLCSWMSW,LCSW; May 10 2016  1:24PM EST                       (Author)    Electronically signed by : ALCIRA Hubbard ; May 17 2016  6:19PM EST                       (Author)

## 2018-01-12 NOTE — PSYCH
Psych Med Mgmt    Appearance: was calm and cooperative, adequate hygiene and grooming and good eye contact  Observed mood: euthymic, but mood appropriate  Observed mood: affect was broad, but affect appropriate  Speech: a normal rate and fluent   Normal rate and volume  Thought processes: coherent/organized  Hallucinations: no hallucinations present  Thought Content: no delusions  Abnormal Thoughts: The patient has no suicidal thoughts and no homicidal thoughts  Orientation: The patient is oriented to person, place and time  Recent and Remote Memory: short term memory intact and long term memory intact  Attention Span And Concentration: concentration intact  Insight: Insight intact  Judgment: Her judgment was intact  Muscle Strength And Tone  Normal gait and station  Language: no difficulty naming common objects and no difficulty repeating a phrase  Fund of knowledge: Patient displays adequate knowledge of current events, adequate fund of knowledge regarding past history and adequate fund of knowledge regarding vocabulary  The patient is experiencing no localized pain  Treatment Recommendations: Pt is doing well and ready for a decrease in Clonazepam  I will otherwise keep the Paroxetine the same and advised adequate hydration and exercise to the extent allowable by PMD   Reduce Clonazepam to 0 5mg (1/2) tab po qd # 15 R2  Paroxetine 20mg (1) tab po qd # 90  F/U Ophthalmology for Lt eye cataract  Return 3 months, sooner prn  Risks, Benefits And Possible Side Effects Of Medications: Risks, benefits, and possible side effects of medications explained to patient and patient verbalizes understanding  Discussed with patient the risks of sedation, respiratory depression, impairment of ability to drive and potential for abuse and addiction related to treatment with benzodiazepine medications   The patient understands risk of treatment with benzodiazepine medications, agrees to not drive if feels impaired and agrees to take medications as prescribed  The patient has been filling controlled prescriptions on time as prescribed to [email or web address] [facility] program     She reports normal appetite, normal energy level, no weight change and normal number of sleep hours  Unk Krabbe is a 81 y/o female here for medication review with primary report "I've been good " She presently denies any mood or anxiety Sxs  The lesion on her nose was confirmed as BCC and removed by Mohs surgery in 11/2016  She will have Lt eye cataract surgery scheduled for within the 6 months (It was postponed due to the Mohs Surgery and f/u )  Pt reports compliance to her psychiatric medications  She feels the Clonazepam is less needed so I will reduce this  Pt enjoyed her holidays and continues volunteerism at [employer]  Pt drove herself to this appt  Vitals  Signs   Recorded: 22MRS1996 11:19AM   Heart Rate: 64  Systolic: 481, LUE, Sitting  Diastolic: 83, LUE, Sitting  Height: 5 ft   Weight: 161 lb   BMI Calculated: 31 44  BSA Calculated: 1 7    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)   2  YANCY (generalized anxiety disorder) (300 02) (F41 1)    Plan    1  From  ClonazePAM 0 5 MG Oral Tablet Take 1/2 tab po twice daily for anxiety To   ClonazePAM 0 5 MG Oral Tablet (KlonoPIN) Take 1/2 tab po once daily for anxiety    2  PARoxetine HCl - 20 MG Oral Tablet (Paxil); TAKE 1 TABLET DAILY    Review of Systems    Constitutional: No fever, no chills, feels well, no tiredness, no recent weight gain or loss  Cardiovascular: no complaints of slow or fast heart rate, no chest pain, no palpitations  Respiratory: no complaints of shortness of breath, no wheezing, no dyspnea on exertion  Gastrointestinal: constipation and Slight constipation per Pt  Genitourinary: no complaints of dysuria, no incontinence, no pelvic pain, no urinary frequency     Musculoskeletal: no complaints of arthralgia, no myalgias, no limb pain, no joint stiffness  Integumentary: no complaints of skin rash, no itching, no dry skin  Neurological: no complaints of headache, no confusion, no numbness, no dizziness  Past Psychiatric History    Past Psychiatric History: Pt was first diagnosed with a psychiatric illness (depression and anxiety) during an admission to the [place] [address] for severe Sxs 3/11/2016 - 3/17/2016  Sxs were sad mood, anhedonia, reduced appetite, insomnia, hopelessness, and passive death wish but no intent or plan of suicide  Sxs started around the holidays of 2015 and became severe within 2 months of admission when Pt moved to a new apartment  Pt also had Sxs of anxiety for at least 5-6 weeks prior to admission with worry over moving primarily  She realized her financial worries were unfounded  She moved back in with one of her sons after discharge  After discharge, Pt was [facility] in the [facility] from 3/21/2016 - 4/5/2016  She continued the same discharge medicines, Paxil and Clonazepam until outpatient f/u  She was first seen for outpatient eval by Dr Emilee Aguayo 4/7/2016  She started seeing Mrs Williamson in 5/10/2016 for psychotherapy and it was stopped by mutual decision after 7/18/2016 visit  No other hospitalizations  She denies any h/o suicide attempts, self-mutilating/other self harm behaviors, violent behavior, ECT, milana legal or  Hx  Substance Abuse Hx    Substance Abuse History: Pt denies any h/o ETOH or illicit drug use or abuse  Active Problems    1  YANCY (generalized anxiety disorder) (300 02) (F41 1)   2  Hyperlipidemia (272 4) (E78 5)   3  Hypertension (401 9) (I10)   4  Major depressive disorder, single episode, 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)    Past Medical History    1  Hypertension (401 9) (I10)    The active problems and past medical history were reviewed and updated today  Allergies    1   Codeine Derivatives   2  Demerol TABS   3  Sulfa Drugs   4  TraMADol HCl TABS    Current Meds   1  ClonazePAM 0 5 MG Oral Tablet; Take 1/2 tab po twice daily for anxiety; Last   Rx:11Oct2016 Ordered   2  HydroCHLOROthiazide 12 5 MG Oral Capsule; TAKE 1 CAPSULE DAILY; Therapy: (Ulis Cage) to Recorded   3  Lisinopril 10 MG Oral Tablet; TAKE 1 TABLET DAILY AS DIRECTED; Therapy: (Ulis Cage) to Recorded   4  PARoxetine HCl - 20 MG Oral Tablet; TAKE 1 TABLET DAILY  Requested for: [id number];   Last Rx:07Apr2016 Ordered   5  Zocor 20 MG Oral Tablet; TAKE 1 TABLET DAILY IN THE EVENING; Therapy: (Recorded:21Mar2016) to Recorded    The medication list was reviewed and updated today  Family Psych History  Mother    1  Family history of depression (V17 0) (Z81 8)    The family history was reviewed and updated today  Social History    · Jew activities   · [employer] graduate   ·    · Home   · Loss or death of child (V61 07) (Z63 4)   · Never a smoker   · No history of alcohol use   · Number of children   · Recreational activities   · Retired   · Volunteer work  The social history was reviewed and updated today  The social history was reviewed and is unchanged  No change as of 1/3/2017      History Of Phys/Sex Abuse Or Perpetration    History Of Phys/Sex Abuse or Perpetration: Pt denies any h/o childhood physical or sexual abuse  Education    Pt denies any h/o learning disabilities and reached childhood milestones on time as far as she knows  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[place] [address]    1  ClonazePAM 0 5 MG Oral Tablet (KlonoPIN); Take 1/2 tab po once daily for anxiety; Last   Rx:03Jan2017 Ordered    2  Zocor 20 MG Oral Tablet (Simvastatin); TAKE 1 TABLET DAILY IN THE EVENING; Therapy: (Ulis Cage) to Recorded    3   HydroCHLOROthiazide 12 5 MG Oral Capsule; TAKE 1 CAPSULE DAILY; Therapy: (Dallas Brill) to Recorded   4  Lisinopril 10 MG Oral Tablet; TAKE 1 TABLET DAILY AS DIRECTED; Therapy: (Dallas Brill) to Recorded    5   PARoxetine HCl - 20 MG Oral Tablet (Paxil); TAKE 1 TABLET DAILY  Requested for:   [id number]; Last PW:[id number] Ordered    Future Appointments    Date/Time Provider Specialty Site   03/30/2017 09:30 AM SIMEON Gunn Psychiatry [facility] 81     Signatures   Electronically signed by : SIMEON King; Og  3 2017 11:26AM EST                       (Author)    Electronically signed by : ALCIRA Boles ; Og  3 2017 11:53AM EST                       (Author)

## 2018-01-12 NOTE — PSYCH
History of Present Illness  [facility] Clinical Progress Note [facility]:   Specialized Services Documentation - Therapist must complete separate progress note for each specific clinical activity in which the client participated during the day  (812) Group Psychotherapy: ([id number]) Gómez Ybarra participated during psychotherapy group focused on coping  Gómez Ybarra shared she continues to struggle with self deprecating thoughts and worry  She shared she tends to pose questions to herself which promote unnecessary worry  Discussed importance of getting better at determining rational vs  irrational thoughts and to refute irrational thoughts  Peers were supportive  Some progress towards goal  Continue group to offer opportunity to relate to peers around similar issues  Treatment Plan Problem(s): 1 1  Rey Rasmussen LCSW     (001) Group Psychotherapy: [id number] Sapphire Castro participated in wellness group focusing on completing a Crisis Information Card to record relevant information such as medications, allergies, three activities to divert attention when having negative thoughts, three supportive individuals to contact in a crisis and phone numbers for [facility], MD after hours for [facility], county crisis and/or to present to emergency room  Pt shared that she gets very anxious that she cannot "think straight" and "says all kinds of things" and becomes "afraid that I am going to make a mistake " Pt was able to identify S/S that preceded her hospitalization and stated that her challenge is to do the things that will stop the progression of anxiety from building and she is not able to do this yet  Pt made mild progress toward goal  Continue group to educate patient on functional coping strategies to utilize in a mental health crisis  Treatment Plan Problem(s): 1 1  Gonsalo Paredes RN       (975) Education Therapy Goals set - review handouts from treatment day    Treatment Plan Problem(s): 1 1     Education Therapy Time - 0900 - 0917 Previous goal was met  Readiness to Learning:  She is receptive to learning  There are  no barriers to learning  Learning Assessment Time - 1330 - 1400   Education completed on  illness and wellness tools  The teaching method was  verbal  Shared area of learning: Yes  FABIO Lopez     (829) Allied Therapy 8360-1785 Barbermarcello Ybarra actively shared in [facility] group focused on self- care  Group explored definition of self-care versus selfishness and explored barriers to self-care  She was attentive as peers shared barriers to self -care  She was quiet yet took notes and appeared engaged  She did share how self-care has changed for her as her children have grown and she had been more independent  Mindfulness exercise explored  Group reinforced the necessity of self -care in wellness versus seeing this as a luxury  Some effort toward goal noted  Continue AT to engage in the development and practice of wellness tools  Treatment Plan Problem(s): 1 1  FABIO Lopez       Case Management Note: Individual Case Management visit not provided today  TREATMENT SESSION NUMBER: 7   Current suicide risk is low  Medications not changed/added/denied  Gonsalo Paredes RN      Active Problems    1  YANCY (generalized anxiety disorder) (300 02) (F41 1)   2  Hyperlipidemia (272 4) (E78 5)   3  Hypertension (401 9) (I10)   4  Major depressive disorder, single episode, 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)    Allergies    1  Codeine Derivatives   2  Demerol TABS   3  Sulfa Drugs   4  TraMADol HCl TABS    Current Meds   1  Hydrochlorothiazide 12 5 MG Oral Capsule; TAKE 1 CAPSULE DAILY; Therapy: (Recorded:21Mar2016) to Recorded   2  KlonoPIN 0 5 MG Oral Tablet; TAKE 0 5 TABLET Twice daily; Therapy: (Fayette Lesches) to Recorded   3  Lisinopril 10 MG Oral Tablet; TAKE 1 TABLET DAILY AS DIRECTED; Therapy: (Fayette Lesches) to Recorded   4  Paxil 20 MG Oral Tablet; TAKE 1 TABLET DAILY;    Therapy: (Recorded:21Mar2016) to Recorded 5  Zocor 20 MG Oral Tablet; TAKE 1 TABLET DAILY IN THE EVENING; Therapy: (Green Letitia) to Recorded    Family Psych History    1  Family history of depression (V17 0) (Z81 8)    Social History    · College graduate   ·    · Never a smoker   · No history of alcohol use   · Retired    Future Appointments    Date/Time Provider Specialty Site   03/30/2016 11:00 AM ALCIRA Vale  Psychiatry [facility]   03/31/2016 10:45 AM ALCIRA Vale  Psychiatry [facility]   04/01/2016 10:45 AM ALCIRA Vale  Psychiatry [facility]   04/07/2016 01:30 PM ALCIRA Pittman  Psychiatry [facility]   05/10/2016 12:00 PM MOODY Marino, [facility]     Signatures   Electronically signed by : Anuradha Cruz LCSW; Mar 29 2016  1:08PM EST                       (Author)    Electronically signed by : FABIO Voss; Mar 29 2016  2:46PM EST                       (Author)    Electronically signed by : Kell Mcarthur RN; Mar 29 2016  3:18PM EST                       (Author)    Electronically signed by : Kell Mcarthur RN; Mar 29 2016  4:06PM EST                       (Author)    Electronically signed by : Kell Mcarthur RN;  Apr 5 2016  3:22PM EST                       (Author)

## 2018-01-12 NOTE — PSYCH
[facility] Treatment Plan    [facility] Treatment Plan   CHALLENGES/PROBLEMS/NEEDS: Depression, anxiety related to a recent move from [facility], where she has lived for 14 years, out to her own apt and pt was unable to live there independently  Freddie Brown is now staying with adult son and his wife  Pt would like to live independently again  Pt relates feeling overwhelmed, confused why she made this abrupt move  Pt relates feeling guilty and badly for imposing on adult children  Pt feels shame over this first Iinpatient hospitalization for [facility] with no history of prior treatment in or outpatient  Date Initiated: 3/21/16     LONG TERM GOAL: 1 0 I will verbalize hopeful and positive statements regarding the future  Date Initiated: 3/21/16   Target Date: 4/5/16   Completion Date: 4/29/16     Date Initiated: 3/21/16   Revision Date: 3/31/16   1 1 I will identify two positive coping mechanisms that I can use when I am feeling overwhelmed  Target Date: 3/31/16   Completion Date: 4/5/16   Date Initiated: 3/21/16   Revision Date: 3/31/16   1 2 I will work on my WRAP and identify two feelings I have about living independently and two feelings I have living with my son and daughter-in-law  Target Date: 3/31/16   Completion Date: 4/5/16   Date Initiated: 3/21/16   Revision Date: 3/31/16   1 3 I will take my medication as prescribed and share any questions/concerns related to any of my medications as/if they arise  Target Date: 3/31/16   Completion Date: 4/5/16   Date Initiated: 3/21/16   Revision Date: 3/31/16   1 4 I will identify supports, including family and non-family members, who will help me in my recovery and state how each one will be helpful to me  Target Date: 3/31/16   Completion Date: 4/5/16          7 DAY REVISION: 1 1,1 2,1 3,1 4 Continue goals as still applicable but unmet   EMT   Date Initiated:     Target Date: 4/11/16   Completion Date: 4/5/16                 PSYCHIATRY: Medication management and education  Continue medication management and education  Date Initiated: 3/21/16   Revision Date: 3/31/2016     NURSIN 1,1 2,1 3,1 4 Provide wellness group to educate pt on S/S of depression and anxiety and medications used in treatment  EMT  1 1,1 2,1 3,1 4 Continue to provide wellness group to educate pt on S/S of depression, anxiety and medications used in treatment  EMT   Date Initiated:    Revision Date: 3/31/16       PSYCHOLOGY: 1 1,1 2 Group therapy 5x/week to offer opportunity to identify and discuss issues and coping  MHW  1 1,1 2 Group therapy 5x/week to offer opportunity to relate to peers around similar issues  MHW     Date Initiated: 3/21/16   Revision Date: 3/31/16     ALLIED THERAPY: 1 1, 1 2 AT daily to address the development and use of wellness tools to reduce symptoms and support recovery meaningful activity  SMM  1 1, 1 2 Continue AT to encourage the practice of learned skills to encourage her wellness through healthy tasks  SMM     Date Initiated: 3-21-16   Revision Date: 3/31/16           CASE MANAGEMENT: 1 0 Meet with pt three to four times weekly to assess treatment progress, D/C planning, UR and support growth/connection  EMT  2 0 Meet with pt 3-4 times weekly to assess treatment progress, D/C planning, UR and supports  EMT   Date Initiated:    Revision Date: 3/31/16         DISCHARGE CRITERIA: Stabilize symptoms and completion of Relapse Prevention Plan     DISCHARGE PLAN: OP Providers       Estimated Length of Stay: 7-10 days     Strengths: Pt has good support from adult children that live locally     Limitations: Pt states this is her first episode of depression and anxiety  Diagnosis and Treatment Plan explained to patient, patient relates understanding diagnosis and is agreeable to Treatment Plan                   Patient Signature: _________________________________ Date/Time: ______________            Signatures   Electronically signed by : FABIO Luna; Mar 21 2016 2:37PM EST                       (Author)    Electronically signed by : ALCIRA Richards ; Mar 21 2016  3:12PM EST                       (Author)    Electronically signed by : Annette Espinal LCSW; Mar 21 2016  3:52PM EST                       (Author)    Electronically signed by : Abran Hendrix RN; Mar 21 2016  4:48PM EST                       (Author)    Electronically signed by : Abran Hendrix RN; Mar 31 2016  9:29AM EST                       (Author)    Electronically signed by : ALCIRA Richards ; Mar 31 2016 10:17AM EST                       (Author)    Electronically signed by : Annette Espinal LCSW; Mar 31 2016 11:06AM EST                       (Author)    Electronically signed by : FABIO Whittington; Apr 1 2016  8:11AM EST                       (Author)    Electronically signed by : Abran Hendrix RN; Apr 4 2016  9:11AM EST                       (Author)    Electronically signed by : Abran Hendrix RN;  Apr 5 2016  4:01PM EST                       (Author)

## 2018-01-12 NOTE — PSYCH
Psych Med Mgmt    Appearance: was calm and cooperative, adequate hygiene and grooming and good eye contact  Observed mood: euthymic, but mood appropriate  Observed mood: affect was broad, but affect appropriate  Speech: a normal rate, speech soft and fluent  Hallucinations: no hallucinations present  Thought Content: no delusions  Abnormal Thoughts: The patient has no suicidal thoughts and no homicidal thoughts  Orientation: The patient is oriented to person, place and time  Recent and Remote Memory: short term memory intact and long term memory intact  Attention Span And Concentration: concentration intact  Insight: Insight intact  Judgment: Her judgment was intact  Muscle Strength And Tone  Normal gait and station  Language: no difficulty naming common objects, no difficulty repeating a phrase and no difficulty writing a sentence  Fund of knowledge: Patient displays adequate knowledge of current events, adequate fund of knowledge regarding past history and adequate fund of knowledge regarding vocabulary  The patient is experiencing no localized pain  Treatment Recommendations: See plan  Risks, Benefits And Possible Side Effects Of Medications: Risks, benefits, and possible side effects of medications explained to patient and patient verbalizes understanding  She reports normal appetite, normal energy level, no weight change and normal number of sleep hours  Giuliana Solano is a 69y/o white female with h/o MDD and YANCY, here for medication review with a general statement that her medications are working  [place] Pt is happy, enjoying living with son and his wife  She is active in her Confucianist community, volunteers at [id number] [facility], and has an active social life with friends  She enjoys shopping  Pt presently denies depression, SI, HI, anxiety, panic attacks, or manic or psychotic Sxs   Pt feels her medicines are working well without SE and would like them continued unchanged  She states Dr Magaly Sanchez had most recently reduced her Clonazepam (was a paper Rx and Pt states she is taking 1/2 of a 0 5mg tab steadily BID)  Results/Data  PHQ-9 Adult Depression Screening [id number] 11:15AM Gerald Saldaña     Test Name Result Flag Reference   PHQ-9 Adult Depression Score 0     Q1: 0, Q2: 0, Q3: 0, Q4: 0, Q5: 0, Q6: 0, Q7: 0, Q8: 0, Q9: 0   PHQ-9 Adult Depression Screening Negative     PHQ-9 Difficulty Level Not difficult at all     PHQ-9 Severity No Depression       PHQ-9 Adult Depression Screening 15Jun2016 03:18PM Chencho Dupree     Test Name Result Flag Reference   PHQ-9 Adult Depression Score 1     Q1: 0, Q2: 0, Q3: 1, Q4: 0, Q5: 0, Q6: 0, Q7: 0, Q8: 0, Q9: 0   PHQ-9 Adult Depression Screening Negative     PHQ-9 Difficulty Level Not difficult at all     PHQ-9 Severity Minimal Depression         Vitals  Signs   Recorded: [id number] 40:84OB   Systolic: 846, LUE  Diastolic: 72, LUE  Heart Rate: 62  Weight: 156 lb 1 6 oz  BMI Calculated: 30 49  BSA Calculated: 1 68    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)   2  YANCY (generalized anxiety disorder) (300 02) (F41 1)    Plan    1  From  ClonazePAM 0 5 MG Oral Tablet Take 1 tab po twice daily prn anxiety To   ClonazePAM 0 5 MG Oral Tablet (KlonoPIN) Take 1/2 tab po twice daily anxiety    Discussed her diagnoses and h/o Sxs, as well as Tx options  She is feeling well and mood and anxiety Sxs are under control with the present regimen, which will be continued as follows:  Paroxetine 20mg (1) tab po qd--Pt has refills on prior Rx  Clonazepam 0 5mg (1/2) tab po bid--Pt has refills on prior Rx  No further psychotherapy--by mutual decision between Pt and Mrs Williamson  Return 2 months     Review of Systems    Constitutional: No fever, no chills, feels well, no tiredness, no recent weight gain or loss     Cardiovascular: no complaints of slow or fast heart rate, no chest pain, no palpitations  Respiratory: no complaints of shortness of breath, no wheezing, no dyspnea on exertion  Gastrointestinal: no complaints of abdominal pain, no constipation, no nausea, no diarrhea, no vomiting  Genitourinary: no complaints of dysuria, no incontinence, no pelvic pain, no urinary frequency  Musculoskeletal: no complaints of arthralgia, no myalgias, no limb pain, no joint stiffness  Integumentary: no complaints of skin rash, no itching, no dry skin  Neurological: no complaints of headache, no confusion, no numbness, no dizziness  Past Psychiatric History    Past Psychiatric History: Pt was first diagnosed with a psychiatric illness (depression and anxiety) during an admission to the [place] [address] for severe Sxs 3/11/2016 - 3/17/2016  Sxs were sad mood, anhedonia, reduced appetite, insomnia, hopelessness, and passive death wish but no intent or plan of suicide  Sxs started around the holidays of 2015 and became severe within 2 months of admission when Pt moved to a new apartment  After discharge, Pt was enrolled in the [facility] from 3/21/2016 - 4/5/2016  She continued the same discharge medicines, Paxil and Clonazepam until outpatient f/u  She was first seen for outpatient eval by Dr Gonzalez Null 4/7/2016  She started seeing Mrs GonzalezJeyJuan José in 5/10/2016 for psychotherapy and it was stopped by mutual decision after 7/18/2016 visit  No other hospitalizations  She denies any h/o suicide attempts or ECT  Substance Abuse Hx    Substance Abuse History: Pt denies any h/o ETOH or illicit drug use or abuse  Active Problems    1  YANCY (generalized anxiety disorder) (300 02) (F41 1)   2  Hyperlipidemia (272 4) (E78 5)   3  Hypertension (401 9) (I10)   4  Major depressive disorder, single episode, 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)    Past Medical History    1   Hypertension (401 9) (I10)    The active problems and past medical history were reviewed and updated today  Allergies    1  Codeine Derivatives   2  Demerol TABS   3  Sulfa Drugs   4  TraMADol HCl TABS    Current Meds   1  ClonazePAM 0 5 MG Oral Tablet; Take 1 tab po twice daily prn anxiety; Last Rx:28Jun2016   Ordered   2  Hydrochlorothiazide 12 5 MG Oral Capsule; TAKE 1 CAPSULE DAILY; Therapy: (Cassandria Blend) to Recorded   3  Lisinopril 10 MG Oral Tablet; TAKE 1 TABLET DAILY AS DIRECTED; Therapy: (Cassandria Blend) to Recorded   4  PARoxetine HCl - 20 MG Oral Tablet; TAKE 1 TABLET DAILY  Requested for: [id number];   Last Rx:07Apr2016 Ordered   5  Zocor 20 MG Oral Tablet; TAKE 1 TABLET DAILY IN THE EVENING; Therapy: (Recorded:21Mar2016) to Recorded    The medication list was reviewed and updated today  Family Psych History  Mother    1  Family history of depression (V17 0) (Z81 8)    The family history was reviewed and updated today  Social History    · [employer] activities   · [employer] graduate   ·    · Home   · Loss or death of child (V61 07) (Z63 4)   · Never a smoker   · No history of alcohol use   · Number of children   · Recreational activities   · Retired   · Volunteer work  The social history was reviewed and updated today  History Of Phys/Sex Abuse Or Perpetration    History Of Phys/Sex Abuse or Perpetration: Pt denies any h/o childhood physical or sexual abuse  Education  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[place] [address]    1  ClonazePAM 0 5 MG Oral Tablet (KlonoPIN); Take 1/2 tab po twice daily anxiety; Last   Rx:11Aug2016 Ordered    2  Zocor 20 MG Oral Tablet (Simvastatin); TAKE 1 TABLET DAILY IN THE EVENING; Therapy: (Cassandria Blend) to Recorded    3  Hydrochlorothiazide 12 5 MG Oral Capsule; TAKE 1 CAPSULE DAILY; Therapy: (Cassandria Blend) to Recorded   4   Lisinopril 10 MG Oral Tablet; TAKE 1 TABLET DAILY AS DIRECTED; Therapy: (Adrianna Moore) to Recorded    5   PARoxetine HCl - 20 MG Oral Tablet (Paxil); TAKE 1 TABLET DAILY  Requested for:   07Apr2016; Last Rx:07Apr2016 Ordered    Future Appointments    Date/Time Provider Specialty Site   10/11/2016 11:00 AM SIMEON Matthew Psychiatry [facility]     Signatures   Electronically signed by : SIMEON Monreal; Aug 11 2016  3:05PM EST                       (Author)    Electronically signed by : SIMEON Monreal; Aug 11 2016  3:06PM EST                       (Author)    Electronically signed by : ALCIRA Vasquez ; Aug 12 2016  1:16PM EST                       (Author)    Electronically signed by : ALCIRA Vasquez ; Aug 12 2016  1:16PM EST                       (Author)

## 2018-01-12 NOTE — PSYCH
History of Present Illness  [facility] Clinical Progress Note [facility]:   Specialized Services Documentation - Therapist must complete separate progress note for each specific clinical activity in which the client participated during the day  (772) Group Psychotherapy: ([id number]) Luz Elena Hercules briefly participated during psychotherapy group focused on recovery  She related to a male peer who shared his perception that his severe anxiety symptoms came upon him like "the flip of a switch", yet now sees that most likely there was a slow progression and he just did not  on the warning signs  Luz Elena Hercules believes that her symptoms most likely gradually appeared and she was not aware  She stated some friend and family told her after the fact that she was not "herself"  This writer suggested she ask for more specifics regarding what they observed as this may be helpful in the future  Slow progress towards goal  Continue group to offer opportunity to relate to peers around similar issues  Treatment Plan Problem(s): 1 1  Olga Villa LCSW     (771) Group Psychotherapy: [id number] Luz Elena Hercules participated in wellness group focused on identifying individual stress symptoms and discussion of each patient's individual physical and psychological signs of stress and anxiety  Luz Elena Hercules shared that she creates a lot of her own stress by being a "people pleaser" and a "perfectionist " Pt states she is not sure why she is like this but has "always been like this " Luz Elena Hercules states that one way she is able to decrease her anxiety is to speak with someone and to ask questions rather than being anxious and worrying about things   Luz Elena Hercules also shared that she will act on things rather than procrastinate so that anxiety provoking things will be resolved but she is not skilled in tolerating anxiety or "letting things go " Pt made good progress toward goal  Continue group to educate pt on identifying physical and psychological symptoms of their own stressors and anxiety and working to functionally decrease unhealthy stress  Treatment Plan Problem(s): 1 1  Darren George RN       (257) Education Therapy Goals set - work on CBT homework    Treatment Plan Problem(s): 1 1  Education Therapy Time - 0900 - 0930 Previous goal was met  Readiness to Learning:  She is receptive to learning  There are  no barriers to learning  Learning Assessment Time - 1330 - 1400   Education completed on  illness and wellness tools  The teaching method was  verbal and demonstration  Shared area of learning: Yes  Scottie Bumpers, MT-BC     (618) Allied Therapy [id number] [place] Just actively shared in [facility] group focused on challenging cognitive distortions and relaxation exercises  Fully engaged in therapist led relaxation exercises offering feedback related to benefits  She expressed struggling with âpersonalization and mental filterâ patterns and found learning the different types of cognitive distortions to be very helpful  Her affect remains constricted  Group practiced CBT technique to challenge and change thoughts and reinforced practice of skill  Some positive effort noted toward goal  Continue AT to increase awareness and use of wellness tools  Treatment Plan Problem(s): 1 1  Scottie Bumpers, MT-BC       Case Management Note:   [id number] [place] Just met with CM today verbalizing she was feeling anxious about several things; paperwork that she needs to complete for OP psychiatrist and questions re: medication renewal  Pt did acknowledge that she becomes very anxious when she has questions about anything  [place] Just stated that her son and daughter-in-law have encouraged her to not worry so much and have reassured her that she "worries too much"   Pt had shared in group that this has been her "personality style" her entire life and she is learning to cope better with anxiety in [facility] rather than letting herself become "overwhelmed "   TREATMENT SESSION NUMBER: 3   Current suicide risk is low  Medications not changed/added/denied  Deandre Pastrana RN      Active Problems    1  YANCY (generalized anxiety disorder) (300 02) (F41 1)   2  Hyperlipidemia (272 4) (E78 5)   3  Hypertension (401 9) (I10)   4  Major depressive disorder, single episode, 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)    Allergies    1  Codeine Derivatives   2  Demerol TABS   3  Sulfa Drugs   4  TraMADol HCl TABS    Current Meds   1  Hydrochlorothiazide 12 5 MG Oral Capsule; TAKE 1 CAPSULE DAILY; Therapy: (Recorded:21Mar2016) to Recorded   2  KlonoPIN 0 5 MG Oral Tablet; TAKE 0 5 TABLET Twice daily; Therapy: (Edgar Howard) to Recorded   3  Lisinopril 10 MG Oral Tablet; TAKE 1 TABLET DAILY AS DIRECTED; Therapy: (Edgar Howard) to Recorded   4  Paxil 20 MG Oral Tablet; TAKE 1 TABLET DAILY; Therapy: (Edgar Howard) to Recorded   5  Zocor 20 MG Oral Tablet; TAKE 1 TABLET DAILY IN THE EVENING; Therapy: (Edgar Howard) to Recorded    Family Psych History    1  Family history of depression (V17 0) (Z81 8)    Social History    · College graduate   ·    · Never a smoker   · No history of alcohol use   · Retired    Future Appointments    Date/Time Provider Specialty Site   03/24/2016 10:00 AM ALCIRA Amos  [facility] PARTIAL HOSPITAL[facility]ATION   03/25/2016 10:00 AM ALCIRA Amos  Psychiatry [facility] PARTIAL HOSPITALIZATION   04/07/2016 01:30 PM ALCIRA Beasley   [facility]     Signatures   Electronically signed by : Randie Babinski, LCSW; Mar 23 2016  2:08PM EST                       (Author)    Electronically signed by : FABIO Ma; Mar 23 2016  2:15PM EST                       (Author)    Electronically signed by : Deandre Pastrana RN; Mar 23 2016  2:54PM EST                       (Author)    Electronically signed by : Deandre Pastrana RN; Mar 23 2016  4:08PM EST                       (Author)

## 2018-01-13 VITALS
SYSTOLIC BLOOD PRESSURE: 116 MMHG | WEIGHT: 158 LBS | HEIGHT: 60 IN | HEART RATE: 62 BPM | BODY MASS INDEX: 31.02 KG/M2 | DIASTOLIC BLOOD PRESSURE: 81 MMHG

## 2018-01-13 NOTE — PSYCH
History of Present Illness  [facility] Clinical Progress Note [facility]:   Specialized Services Documentation - Therapist must complete separate progress note for each specific clinical activity in which the client participated during the day  (497) Group Psychotherapy: ([id number]) Suzanne Bobo participated during psychotherapy group focused on recovery  She shared her recent stressor of making an impulsive decision that she now regrets and punishes herself about  Discussion centered on importance of learning the difference between thoughts and feelings and what is rational vs  irrational  Peers offered her appropriate and supportive feedback which she accepted  She verbalized surprise at discovering that others shared similar symptoms and stressors  Some progress towards goal  Continue group to offer opportunity to relate to peers around similar issues  Treatment Plan Problem(s): 1 1  Randie Babinski, LCSW     (374) Group Psychotherapy: ([id number]) Client attended group focused on healthy relationships and communication  Suzanne Bobo disclosed about her family members whom she feels have been very supportive and helpful during this time  Client reflected on ways that she has supported other family members that have made her feel positively  Offered support to other group members  Beginning progress toward goal  Continue to attend group to offer opportunities to relate appropriately to peers  ALCIRA Greene  Ed  Treatment Plan Problem(s): 1 4        (942) Education Therapy Goals set - Walk and play cards    Treatment Plan Problem(s): 1 1, 1 2  Education Therapy Time - 0900 - 0930 Previous goal was met  Readiness to Learning:  She is receptive to learning  There are  no barriers to learning  Learning Assessment Time - 1330 - 1400   Education completed on  illness and wellness tools  The teaching method was  verbal and written  Shared area of learning: Yes   FABIO Ma     (617) Allied Therapy [id number] Gabriela Fan actively shared in [facility] group focused on managing anger  She engaged in task exploring effective versus ineffective ways in addition to skills to refocus in the moment  She appeared attentive yet limited her personal disclosure  Group discussed anger at self when changes occur in our lives and one feels depressed  Group explored the benefits of emotional control and positive choices with intense emotion  Some beginning effort toward goal noted  Continue AT to explore wellness tool awareness and practice  Treatment Plan Problem(s): 1 1, 1 2  Katty Sanders [facility]       Case Management Note:   [id number] Gabriela Fan met with CM to discuss progress in Program on second day  Gabriela Fan stated that she is less anxious and fearful of speaking in group today and that peers and CM facilitator had given her positive feedback  Pt states that she is still feeling badly that she made the mistake with renting the apt so abruptly although no one is admonishing her for it in her family except herself  Pt reviewed and signed treatment plan and received a copy  CM has contacted [facility] for an OP psychiatrist and OP therapist for Gabriela Fan and was given a date of 4/7/16 at 1:30PM for first appointment available for pt to meet with Dr Trang Ferguson  No OP therapist has been assigned as yet but OP therapist at [facility] has been requested  Pt and CM spoke with pt's son, Rodrigo Sawyer who was contacting OP providers to inform him that an OP psychiatrist appointment has been made  Pt was given paperwork to complete, and bring, to OP psychiatrist appointment  TREATMENT SESSION NUMBER: 2   Current suicide risk is low  Medications not changed/added/denied  Adrian Pérez RN      Active Problems    1  YANCY (generalized anxiety disorder) (300 02) (F41 1)   2  Hyperlipidemia (272 4) (E78 5)   3  Hypertension (401 9) (I10)   4  Major depressive disorder, single episode, 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)    Allergies    1   Codeine Derivatives   2  Demerol TABS   3  Sulfa Drugs   4  TraMADol HCl TABS    Current Meds   1  Hydrochlorothiazide 12 5 MG Oral Capsule; TAKE 1 CAPSULE DAILY; Therapy: (Recorded:21Mar2016) to Recorded   2  KlonoPIN 0 5 MG Oral Tablet; TAKE 0 5 TABLET Twice daily; Therapy: (Dain Magana) to Recorded   3  Lisinopril 10 MG Oral Tablet; TAKE 1 TABLET DAILY AS DIRECTED; Therapy: (Dain Magana) to Recorded   4  Paxil 20 MG Oral Tablet; TAKE 1 TABLET DAILY; Therapy: (Dain Magana) to Recorded   5  Zocor 20 MG Oral Tablet; TAKE 1 TABLET DAILY IN THE EVENING; Therapy: (Dain Magana) to Recorded    Family Psych History    1  Family history of depression (V17 0) (Z81 8)    Social History    · College graduate   ·    · Never a smoker   · No history of alcohol use   · Retired    Future Appointments    Date/Time Provider Specialty Site   03/23/2016 10:00 AM Venu Zayas M D  Psychiatry [facility] PARTIAL HOSPITALIZATION   03/24/2016 10:00 AM ALCIRA Regalado  Psychiatry [facility]   03/25/2016 10:00 AM ALCIRA Regalado  Psychiatry [facility] H[facility]ITAL[facility]   04/07/2016 01:30 PM ALCIRA Damico   Psychiatry [facility]     Signatures   Electronically signed by : Mj Barrios LCSW; Mar 22 2016 11:59AM EST                       (Author)    Electronically signed by : Xuan Lopez; Mar 22 2016 12:10PM EST                       (Author)    Electronically signed by : FABIO Martinez; Mar 22 2016  2:49PM EST                       (Author)    Electronically signed by : Maico Maza RN; Mar 22 2016  3:02PM EST                       (Author)    Electronically signed by : Maico Maza RN; Mar 23 2016  2:56PM EST                       (Author)

## 2018-01-13 NOTE — PSYCH
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)   3  Hypertension (401 9) (I10)   4  Family history of depression (V17 0) (Z81 8) : Mother   5  College graduate   6  Retired   9  Never a smoker   8  History of Knee Replacement   9  Hyperlipidemia (272 4) (E78 5)   10      [facility] Physician's Orders  ADMIT TO: Partial Hospitalization 5 x per week for 15 days  Vital signs routine  Diet: regular  Group Psychotherapy 9 x per week    Allied Therapy Group 6 x per week     Diagnosis: F 32 0, F 41 1  Medications: As per medication list     âI certify that the continuation of Partial Hospitalization services is medically necessary to improve and/or maintain the patient's condition and functional level, and to prevent relapse or hospitalization, and that this could not be done at a less intensive level of care  â     Physician Signature: Raymond Gutierrez MD     Nurse Signature: Michael Ruvalcaba      Chief Complaint  This is a 68year-old female who came for follow up after discharge from impatient unit where she was admitted from 3/11/16 to 3/17/16  History of Present Illness  The patient is a 68year-old female, has no prior history , referred from \[facility]\"" adult behavioral impatient unit where she was admitted from 3/11/16 to 3/17 /16 due to severe depression and anxiety for the last 2 months  She had decided to move out the [facility] because she thought she had no money, but this was not the case  She moved to an apartment and stated to feels anxious, scared , no sleeping, was feeling hopeless and her anxiety increased, her appetite became worse, she had anhedonia, she was constantly worries about her future  She thought that been dead was better, but denies any suicidal ideation, plan or intent  She moved with her son and that help   Today she is anxious, denies any suicidal or homicidal ideation, plan or intent  She denies any history of manic episodes or psychotic symptoms  Her PHQ-9 is 13  Review of Systems  depression, sleep disturbances and decreased functioning ability  Constitutional: No fever, no chills, no recent weight gain or recent weight loss  ENT: no ear ache, no loss of hearing, no nosebleeds or nasal discharge, no sore throat or hoarseness  Cardiovascular: no complaints of slow or fast heart rate, no chest pain, no palpitations, no leg claudication or lower extremity edema  Respiratory: no complaints of shortness of breath, no wheezing, no dyspnea on exertion, no orthopnea or PND  Gastrointestinal: no complaints of abdominal pain, no constipation, no nausea or diarrhea, no vomiting, no bloody stools  Genitourinary: no complaints of dysuria, no incontinence, no pelvic pain, no dysmenorrhea, no vaginal discharge or abnormal vaginal bleeding  Musculoskeletal: no complaints of arthralgia, no myalgia, no joint swelling or stiffness, no limb pain or swelling  Integumentary: no complaints of skin rash or lesion, no itching or dry skin, no skin wounds  Neurological: no complaints of headache, no confusion, no numbness or tingling, no dizziness or fainting  Other Symptoms: endocrine is negative  ROS reviewed  Past Psychiatric History    Past Psychiatric History: The patient has no prior history  This was her first inpatient psychiatric admission  She has no prior treatment other than a short tried with Celexa and Xanax  No history of suicide attempts or violence  Substance Abuse Hx    Substance Abuse History: She denies any  Surgical History    The surgical history was reviewed and updated today  Allergies    1  Codeine Derivatives   2  Demerol TABS   3  Sulfa Drugs   4  TraMADol HCl TABS    Current Meds   1  Hydrochlorothiazide 12 5 MG Oral Capsule; TAKE 1 CAPSULE DAILY; Therapy: (Recorded:21Mar2016) to Recorded   2   KlonoPIN 0 5 MG Oral Tablet (ClonazePAM); TAKE 0 5 TABLET Twice daily; Therapy: (Luanne Holland) to Recorded   3  Lisinopril 10 MG Oral Tablet; TAKE 1 TABLET DAILY AS DIRECTED; Therapy: (Luanne Holland) to Recorded   4  Paxil 20 MG Oral Tablet (PARoxetine HCl); TAKE 1 TABLET DAILY; Therapy: (Luanne Holland) to Recorded   5  Zocor 20 MG Oral Tablet (Simvastatin); TAKE 1 TABLET DAILY IN THE EVENING; Therapy: (Recorded:21Mar2016) to Recorded    The medication list was reviewed and updated today  Family Psych History    1  Family history of depression (V17 0) (Z81 8)  Positive for depression in her mother  The family history was reviewed and updated today  Social History    · College graduate   ·    · Never a smoker   · No history of alcohol use   · Retired  The social history was reviewed and updated today  The patient is , lives with her son, retire, [employer] in education and nursing degree  No legal problems  No   History Of Phys/Sex Abuse Or Perpetration    History Of Phys/Sex Abuse or Perpetration: She denied any history of physical or sexual abuse  Vitals  Signs [Data Includes: Current Encounter]   Recorded: 21Mar2016 09:08AM   Temperature: 97 5 F, Oral  Heart Rate: 80, R Radial  Pulse Quality: Normal, R Radial  Respiration: 16  Respiration Quality: Normal  Systolic: 388, RUE, Sitting  Diastolic: 71, RUE, Sitting  Height: 5 ft 3 in  Weight: 146 lb   BMI Calculated: 25 86  BSA Calculated: 1 69    Physical Exam    Appearance: was calm and cooperative, adequate hygiene and grooming and good eye contact  Observed mood: anxious  Observed mood: affect appropriate  Speech: a normal rate  Thought processes: coherent/organized  Hallucinations: no hallucinations present  Thought Content: no delusions  Abnormal Thoughts: The patient has no suicidal thoughts and no homicidal thoughts  Orientation: The patient is oriented to person, place and time     Recent and Remote Memory: short term memory intact and long term memory intact  Attention Span And Concentration: concentration intact  Insight: Limited insight  Judgment: Her judgment was limited  Muscle Strength And Tone  Muscle strength and tone were normal  Normal gait and station  Language: no difficulty naming common objects, no difficulty repeating a phrase and no difficulty writing a sentence  Fund of knowledge: Patient displays adequate knowledge of current events, adequate fund of knowledge regarding past history and adequate fund of knowledge regarding vocabulary  The patient is experiencing no localized pain  On a scale of 0 - 10 the pain severity is a 0  Treatment Recommendations: 1  Admit to [place] 2  Medication Management 3  Group Therapy  Risks, Benefits And Possible Side Effects Of Medications: Risks, benefits, and possible side effects of medications explained to patient and patient verbalizes understanding  DSM    Provisional Diagnosis: Major Depression Single episode  Generalized Anxiety Disorder    End of Encounter Meds    1  KlonoPIN 0 5 MG Oral Tablet (ClonazePAM); TAKE 0 5 TABLET Twice daily; Therapy: (Recorded:21Mar2016) to Recorded    2  Paxil 20 MG Oral Tablet (PARoxetine HCl); TAKE 1 TABLET DAILY; Therapy: (Demond Andrade) to Recorded    3  Zocor 20 MG Oral Tablet (Simvastatin); TAKE 1 TABLET DAILY IN THE EVENING; Therapy: (Demond Burnhamten) to Recorded    4  Hydrochlorothiazide 12 5 MG Oral Capsule; TAKE 1 CAPSULE DAILY; Therapy: (Demond Hasten) to Recorded   5  Lisinopril 10 MG Oral Tablet; TAKE 1 TABLET DAILY AS DIRECTED; Therapy: (Recorded:21Mar2016) to Recorded    Future Appointments    Date/Time Provider Specialty Site   03/22/2016 10:00 AM ALCIRA Cohen  Psychiatry [facility] PARTIAL HOSPITALIZATION   03/23/2016 10:00 AM ALCIRA Cohen   Psychiatry [facility] PARTIAL HOSPITALIZATION   03/24/2016 10:00 AM Glen Zayas ALCIRA Chun   03/25/2016 10:00 AM ALCIRA Calabrese   Psychiatry [facility]     Signatures   Electronically signed by : ALCIRA Almodovar ; Mar 21 2016  9:44AM EST                       (Author)    Electronically signed by : Santhosh Heath RN; Mar 21 2016 11:05AM EST                       (Author)    Electronically signed by : ALCIRA Almodovar ; Mar 21 2016  3:08PM EST                       (Author)

## 2018-01-13 NOTE — PSYCH
History of Present Illness  [facility] Clinical Progress Note [facility]:   Specialized Services Documentation - Therapist must complete separate progress note for each specific clinical activity in which the client participated during the day  (649) Group Psychotherapy: (2670-4326) Jose Manuel Altamirano participated only during the introductory phase of psychotherapy group focused on recovery  Although she seemed to be actively listening, she did not join in peer discussions  Slow progress towards goal  Continue group to offer opportunity to relate to peers around similar issues  Treatment Plan Problem(s): 1 1  Molly Pappas LCSW     (506) Group Psychotherapy: [id number] Jose Manuel Altamirano participated in wellness group focusing on learning strategies to decrease anxiety including acceptance, cultivating patience, trusting most problems eventually work out and trying a more spiritual approach to coping with life stressors  Pt shared that she is "perfectionistic" and "I bring a lot of my anxiety on myself " Jose Manuel Altamirano shared that she does get very anxious that she is "doing the correct thing" and is impatient when unable to resolve things quickly  Pt gave the example of worrying that something is wrong with her eyes and called to schedule an eye MD appointment feeling that she needed to be seen ASAP and that is the only way she would be less anxious  Jose Manuel Altamirano is not able, at this time, to let things go or prioritize so that everything is not "an emergency" and a "source of anxiety " Pt made good progress toward goal  Continue to offer group to educate pt and provide opportunity to practice healthy strategies for coping with anxiety  Treatment Plan Problem(s): 1 1  Elias Villegas RN       (861) Education Therapy Goals set - Work on Yoel & Vitaliy Problem(s): 1 2  Education Therapy Time - 0900 - 0930 Previous goal was met  Readiness to Learning:  She is receptive to learning  There are  no barriers to learning    Learning Assessment Time - 1330 - 1400   Education completed on  illness and wellness tools  The teaching method was  verbal and demonstration  Shared area of learning: Yes  FABIO Su     (506) Allied Therapy 9287-1700 Elaine Jacobs actively shared in [facility] group focused on WRAP development and use  She engaged in instrument improvisation exploring triggers, warning signs and wellness tools  She shared easily as small groups explored sections of the WRAP  She voiced having more insight into the tool  She appeared constricted and does appear to become overwhelmed easily  Group explored the benefits of WRAP development and strategies to using this tool to support ongoing recovery  Some effort noted toward goal  Continue AT to explore recovery strategies and use  Treatment Plan Problem(s): 1 2  FABIO Su       Case Management Note: Individual Case Management visit not provided today  TREATMENT SESSION NUMBER: 4   Current suicide risk is low  Medications not changed/added/denied  Gemini Cid RN      Active Problems    1  YANCY (generalized anxiety disorder) (300 02) (F41 1)   2  Hyperlipidemia (272 4) (E78 5)   3  Hypertension (401 9) (I10)   4  Major depressive disorder, single episode, 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)    Allergies    1  Codeine Derivatives   2  Demerol TABS   3  Sulfa Drugs   4  TraMADol HCl TABS    Current Meds   1  Hydrochlorothiazide 12 5 MG Oral Capsule; TAKE 1 CAPSULE DAILY; Therapy: (Recorded:21Mar2016) to Recorded   2  KlonoPIN 0 5 MG Oral Tablet; TAKE 0 5 TABLET Twice daily; Therapy: (Estela Colin) to Recorded   3  Lisinopril 10 MG Oral Tablet; TAKE 1 TABLET DAILY AS DIRECTED; Therapy: (Estela Colin) to Recorded   4  Paxil 20 MG Oral Tablet; TAKE 1 TABLET DAILY; Therapy: (Estela Colin) to Recorded   5  Zocor 20 MG Oral Tablet; TAKE 1 TABLET DAILY IN THE EVENING; Therapy: (Estela Colin) to Recorded    Family Psych History    1   Family history of depression (V17 0) (Z81 8)    Social History    · College graduate   ·    · Never a smoker   · No history of alcohol use   · Retired    Future Appointments    Date/Time Provider Specialty Site   03/25/2016 09:00 AM ALCIRA Garsia  Psychiatry [facility]   04/07/2016 01:30 PM ALCIRA Garsia  Psychiatry [facility]   05/10/2016 12:00 PM MOODY Goldstein, [place] [facility] [address] THERAPISTS     Signatures   Electronically signed by : FABIO Wright; Mar 24 2016  2:45PM EST                       (Author)    Electronically signed by : Roula Tello LCSW; Mar 24 2016  2:53PM EST                       (Author)    Electronically signed by : Ishmael Landau, RN; Mar 24 2016  4:23PM EST                       (Author)    Electronically signed by : Ishmael Landau, RN;  Apr 5 2016  3:12PM EST                       (Author)

## 2018-01-13 NOTE — PSYCH
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; "I am back to myself regarding (her spending time at the long term care facility)  "  more recently attended her one son's halfway dinner which she states had been attended by 79 people;  has been trying to "get out a little more with different people which has helped (me) a lot;" She states is working on her son's and daughter-in-law's garden   has been walking by herself; "I kind of like it "  has not yet tried "a lot of driving: going to my son's and to the grocery store;"  had met with a card club group recently for lunch with plans to resume participating in two card groups this fall;  also went to watch her one grandson play baseball last week; "I don't seem to worry (as often)  " "I am beginning to better understand myself better " discussed her efforts to reflect on her bh hospitalization which she noted as some thing for which she thank God; A: She presents as having developed some insight with her recent bh event  She noted had had a challenging time with knowing she needs to move out of her residence following 14 years  P: (G# 1 & 2 ) will continue her efforts to socialize and to continue participating in activities she enjoys;   Pain Scale and Suicide Risk [facility]: On a scale of 0 to 10, the patient rates current pain at 0   Current suicide risk is low      Behavioral Health Treatment Plan Queenie Riley: Diagnosis and Treatment Plan explained to patient, patient relates understanding diagnosis and is agreeable to Treatment Plan  Results/Data  Encounter Results   PHQ-9 Adult Depression Screening 15Jun2016 03:18PM Chip Bouche     Test Name Result Flag Reference   PHQ-9 Adult Depression Score 1     Q1: 0, Q2: 0, Q3: 1, Q4: 0, Q5: 0, Q6: 0, Q7: 0, Q8: 0, Q9: 0   PHQ-9 Adult Depression Screening Negative     PHQ-9 Difficulty Level Not difficult at all     PHQ-9 Severity Minimal Depression       Results   PHQ-9 Adult Depression Screening 15Jun2016 03:18PM Chip Bouche     Test Name Result Flag Reference   PHQ-9 Adult Depression Score 1     Q1: 0, Q2: 0, Q3: 1, Q4: 0, Q5: 0, Q6: 0, Q7: 0, Q8: 0, Q9: 0   PHQ-9 Adult Depression Screening Negative     PHQ-9 Difficulty Level Not difficult at all     PHQ-9 Severity Minimal Depression         Assessment    1  YANCY (generalized anxiety disorder) (300 02) (F41 1)   2   Major depressive disorder, single episode, 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

## 2018-01-13 NOTE — PSYCH
Psych Med Mgmt    Appearance: was calm and cooperative, adequate hygiene and grooming and good eye contact  Observed mood: euthymic, but mood appropriate  Observed mood: affect was broad, but affect appropriate  Speech: a normal rate and fluent   Normal volume  Thought processes: coherent/organized   Intact associations  Hallucinations: no hallucinations present  Thought Content: no delusions  Abnormal Thoughts: The patient has no suicidal thoughts and no homicidal thoughts  Orientation: The patient is oriented to person, place and time  Recent and Remote Memory: short term memory intact and long term memory intact  Attention Span And Concentration: concentration intact  Insight: Insight intact  Judgment: Her judgment was intact  Muscle Strength And Tone  Normal gait and station  Language: no difficulty naming common objects and no difficulty repeating a phrase  Fund of knowledge: Patient displays adequate knowledge of current events, adequate fund of knowledge regarding past history and adequate fund of knowledge regarding vocabulary  The patient is experiencing no localized pain  Treatment Recommendations: Pt is doing well on present medications which I will continue  I advised healthy eating, adequate hydration and exercise to the extent allowable by PMD  (Pt admits to eating much sweets)  Treatment plan done  Pt does not intend to restart psychotherapy  She accepts the plan  Continue:  Paroxetine 20mg (1) tab po qd # 90 R1  Clonazepam 0 5mg (1/2) tab po qd # 15 R1  Return 4 months, sooner prn  Risks, Benefits And Possible Side Effects Of Medications: Risks, benefits, and possible side effects of medications explained to patient and patient verbalizes understanding  Discussed with patient the risks of sedation, respiratory depression, impairment of ability to drive and potential for abuse and addiction related to treatment with benzodiazepine medications   The patient understands risk of treatment with benzodiazepine medications, agrees to not drive if feels impaired and agrees to take medications as prescribed  The patient has been filling controlled prescriptions on time as prescribed to [facility] program     She reports normal appetite, normal energy level, no weight change and normal number of sleep hours  Christine Dorado is an [de-identified] female here for medication review with primary statement of "I'm fine really, I had a good Summer " Pt is busy almost daily with volunteerism, social outings with friends and her medical appts  Pt presently denies any mood, anxiety or psychotic Sxs  She reports compliance to her medications without SE  Pt is feeling well and does not intend to continue with psychotherapy (had not gone back after last visit in 2016)  She has been considering restarting weight watchers  Vitals  Signs   Recorded: 13Sep2017 09:19AM   Heart Rate: 62  Systolic: 632, LUE, Sitting  Diastolic: 81, LUE, Sitting  Height: 5 ft   Weight: 158 lb   BMI Calculated: 30 86  BSA Calculated: 1 69    Assessment    1  YANCY (generalized anxiety disorder) (300 02) (F41 1)   2  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)    Plan    1  ClonazePAM 0 5 MG Oral Tablet (KlonoPIN); Take 1/2 tab po once daily for anxiety    2  PARoxetine HCl - 20 MG Oral Tablet (Paxil); TAKE 1 TABLET DAILY    Review of Systems    Constitutional: No fever, no chills, feels well, no tiredness, no recent weight gain or loss  Cardiovascular: no complaints of slow or fast heart rate, no chest pain, no palpitations  Respiratory: no complaints of shortness of breath, no wheezing, no dyspnea on exertion  Gastrointestinal: no complaints of abdominal pain, no constipation, no nausea, no diarrhea, no vomiting  Genitourinary: no complaints of dysuria, no incontinence, no pelvic pain, no urinary frequency     Musculoskeletal: no complaints of arthralgia, no myalgias, no limb pain, no joint stiffness  Integumentary: no complaints of skin rash, no itching, no dry skin  Neurological: no complaints of headache, no confusion, no numbness, no dizziness  Past Psychiatric History    Past Psychiatric History: Pt was first diagnosed with a psychiatric illness (depression and anxiety) during an admission to the [place] [address] for severe Sxs 3/11/2016 - 3/17/2016  Sxs were sad mood, anhedonia, reduced appetite, insomnia, hopelessness, and passive death wish but no intent or plan of suicide  Sxs started around the holidays of 2015 and became severe within 2 months of admission when Pt moved to a new apartment  Pt also had Sxs of anxiety for at least 5-6 weeks prior to admission with worry over moving primarily  She realized her financial worries were unfounded  She moved back in with one of her sons after discharge  After discharge, Pt was enrolled in the [facility] from 3/21/2016 - 4/5/2016  She continued the same discharge medicines, Paxil and Clonazepam until outpatient f/u  She was first seen for outpatient eval by Dr Mcdowell Pod 4/7/2016  She started seeing Mrs Williamson in 5/10/2016 for psychotherapy and it was stopped by mutual decision after 7/18/2016 visit  No other hospitalizations  She denies any h/o suicide attempts, self-mutilating/other self harm behaviors, violent behavior, ECT, milana legal or  Hx  Substance Abuse Hx    Substance Abuse History: Pt denies any h/o ETOH or illicit drug use or abuse  Active Problems    1  YANCY (generalized anxiety disorder) (300 02) (F41 1)   2  Hyperlipidemia (272 4) (E78 5)   3  Hypertension (401 9) (I10)   4  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)    Past Medical History    1  Hypertension (401 9) (I10)    The active problems and past medical history were reviewed and updated today  Allergies    1  Codeine Derivatives   2  Demerol TABS   3  Sulfa Drugs   4   TraMADol HCl TABS    Current Meds   1  ClonazePAM 0 5 MG Oral Tablet; Take 1/2 tab po once daily for anxiety; Last   Rx:30Mar2017 Ordered   2  HydroCHLOROthiazide 12 5 MG Oral Capsule; TAKE 1 CAPSULE DAILY; Therapy: (Edison Grange) to Recorded   3  Lisinopril 10 MG Oral Tablet; TAKE 1 TABLET DAILY AS DIRECTED; Therapy: (Edison Grange) to Recorded   4  PARoxetine HCl - 20 MG Oral Tablet; TAKE 1 TABLET DAILY  Requested for: [id number];   Last Rx:30Mar2017 Ordered   5  Zocor 20 MG Oral Tablet; TAKE 1 TABLET DAILY IN THE EVENING; Therapy: (Recorded:21Mar2016) to Recorded    The medication list was reviewed and updated today  Family Psych History  Mother    1  Family history of depression (V17 0) (Z81 8)    The family history was reviewed and updated today  Social History    · Christian activities   · [employer] graduate   ·    · Home   · Loss or death of child (V61 07) (Z63 4)   · Never a smoker   · No history of alcohol use   · Number of children   · Recreational activities   · Retired   · Volunteer work  The social history was reviewed and updated today  The social history was reviewed and is unchanged  No change as of 9/13/2017      History Of Phys/Sex Abuse Or Perpetration    History Of Phys/Sex Abuse or Perpetration: Pt denies any h/o childhood physical or sexual abuse  Education   Pt denies any h/o learning disabilities and reached childhood milestones on time as far as she knows  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[place] [address]    1  ClonazePAM 0 5 MG Oral Tablet (KlonoPIN); Take 1/2 tab po once daily for anxiety; Last   Rx:13Sep2017 Ordered    2  Zocor 20 MG Oral Tablet (Simvastatin); TAKE 1 TABLET DAILY IN THE EVENING; Therapy: (Edison Grange) to Recorded    3  HydroCHLOROthiazide 12 5 MG Oral Capsule; TAKE 1 CAPSULE DAILY; Therapy: (Edison Grange) to Recorded   4  Lisinopril 10 MG Oral Tablet; TAKE 1 TABLET DAILY AS DIRECTED; Therapy: (Kemi Arreaga) to Recorded    5   PARoxetine HCl - 20 MG Oral Tablet (Paxil); TAKE 1 TABLET DAILY  Requested for:   [id number]; Last Rx:[id number]; Status: ACTIVE - Transmit to Pharmacy - Awaiting   Verification Ordered    Future Appointments    Date/Time Provider Specialty Site   01/15/2018 09:30 AM SIMEON Arambula Psychiatry [facility]     Signatures   Electronically signed by : SIMEON Persaud; Sep 13 2017  9:30AM EST                       (Author)    Electronically signed by : ALCIRA Hunt ; Sep 13 2017 11:24AM EST                       (Author)

## 2018-01-13 NOTE — PSYCH
Chief Complaint  This is a 68year-old female who came for follow up after discharge from impatient unit where she was admitted from 3/11/16 to 3/17/16  History of Present Illness  The patient is a 68year-old female, has no prior history , referred from \[facility]\"" adult behavioral impatient unit where she was admitted from 3/11/16 to 3/17 /16 due to severe depression and anxiety for the last 2 months  She had decided to move out the [facility] because she thought she had no money, but this was not the case  She moved to an apartment and stated to feels anxious, scared , no sleeping, was feeling hopeless and her anxiety increased, her appetite became worse, she had anhedonia, she was constantly worries about her future  She thought that been dead was better, but denies any suicidal ideation, plan or intent  She moved with her son and that help  Today she is anxious, denies any suicidal or homicidal ideation, plan or intent  She denies any history of manic episodes or psychotic symptoms  Her PHQ-9 is 13  Pre-morbid level of function and History of Present Illness:  Reggie Spatz was referred to [facility] by [facility] after hospitalization for depression and anxiety related to a recent move she made out of her residence in senior housing, where she had lived for 14 years, and into a private apt  Pt relates that she made the move because she had an increase in income from [employer], after her ex- passed away, and she wanted to leave senior housing for "some peace and quiet " She stated that after she rented the apt she became fearful being alone and did not sleep there but spent the nights at her youngest son's home where she is now residing full-time   Pt states she is not a "dependent" person and feels badly that she is imposing on her son and daughter-in-law but that she "probably didn't think through the move and I regret I did that " Pt states she stopped seeing her friends and stopped playing cards and volunteering over the past two months  Jorge Perkins is denying suicidal or homicidal thoughts  Pt denies ever having previous depression or anxiety and has not been seen by OP providers in past and denies previous hospitalizations for [facility]  Reason for evaluation and partial hospitalization as an alternative to inpatient hospitalization:   IOP level of care indicated to prevent continued decompensation and avoid re-hospitalization  Previous Psychiatric/psychological treatment/year: IP BH at [facility] 3/11/16-3/17/16  Current Psychiatrist/Therapist: None  Outpatient and/or Partial and Other [employer] Used (CTT, ICM, VNA): Inpatient:   Family Constellation (include parents, relationship with each and pertinent Psych/Medical History): Mother: -Pt states her mother had depression after open heart surgery   Spouse: Pt states she  in 18, father of her sons, and this man recently passed away  Father: -Denies any BH or substance abuse hx   Children: Pt states she had four sons  She is currently living with her youngest son, Audelia Vazquez and his wife, John Chance, One son is  in 18 due to [address], sons Alex Chavis and Cherlynn Lefort live locally not far from pt and pt states all her sons have been supportive of her  Sibling: One brother who pt states she does not know where he lives  No contact  She lives with Son, Audelia Vazquez and daughter-in-law, John Chance  She does not live alone  Domestic Violence: No past history of domestic violence  The patient is not currently experiencing domestic violence  There is not suspected domestic violence  There is no history of child abuse  There is no history of sexual abuse     School or Work History (strengths/limitations/needs: Jorge Perkins stated that she went back to school to 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 at [employer] when she was  and worked several years as a  then attended [address] [facility]  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 [employer]  Her highest grade level achieved was  post graduate degree   history includes None  Financial status includes No problem  LEISURE ASSESSMENT (Include past and present hobbies/interests and level of involvement (Ex: Group/Club Affiliations): Liked to play cards with friends, volunteered at [place], watch her grandchildren play sports, go to movies and out to eat  Stopped doing fun activities when depressed  Her primary language is  [place]  Ethnic considerations are Pt is from [facility]  Religions affiliations and level of involvement - [address] going to [employer] when she became depressed  Spirituality and sparkle have helped her cope with difficult situations in her life  FUNCTIONAL STATUS: There has been a recent change in the patient's ability to do the following: managing medications   She needs van service  Level of Assistance Needed/By Whom?: Pt is not driving now she stated due to being on medications  Pt is now using medication boxes as amount of medications have increased  The patient learns best by  demonstration  SUBSTANCE ABUSE ASSESSMENT: no current substance abuse and no past substance abuse  No previous detox/rehab treatment  HEALTH ASSESSMENT: She has not lost 10 lbs or more in the last 6 months without trying  She has not had decreased appetite for 5 days or more  She has not gained 10 lbs or more in the last 6 months without trying  no nausea  no vomiting  no diarrhea  no referral to PCP needed  no referral to nutritionist needed  no pregnancy  not referred to PCP  Current PCP: Ronald Wyatt  PCP notified  LEGAL: No Mental Health Advance Directive or Power of  on file  She does not want an information packet about Mental Health Advance Directives  Diagnosis and Treatment Plan explained to patient, patient relates understanding diagnosis and is agreeable to Treatment Plan           Prognosis: Pt states she is willing to attend [facility] five days weekly because she wants to resume level of functioning she had before episode of depression and anxiety brought on by move of residence  Review of Systems  depression, sleep disturbances and decreased functioning ability  Constitutional: No fever, no chills, no recent weight gain or recent weight loss  ENT: no ear ache, no loss of hearing, no nosebleeds or nasal discharge, no sore throat or hoarseness  Cardiovascular: no complaints of slow or fast heart rate, no chest pain, no palpitations, no leg claudication or lower extremity edema  Respiratory: no complaints of shortness of breath, no wheezing, no dyspnea on exertion, no orthopnea or PND  Gastrointestinal: no complaints of abdominal pain, no constipation, no nausea or diarrhea, no vomiting, no bloody stools  Genitourinary: no complaints of dysuria, no incontinence, no pelvic pain, no dysmenorrhea, no vaginal discharge or abnormal vaginal bleeding  Musculoskeletal: no complaints of arthralgia, no myalgia, no joint swelling or stiffness, no limb pain or swelling  Integumentary: no complaints of skin rash or lesion, no itching or dry skin, no skin wounds  Neurological: no complaints of headache, no confusion, no numbness or tingling, no dizziness or fainting  Other Symptoms: endocrine is negative  ROS reviewed  Past Psychiatric History    Past Psychiatric History: The patient has no prior history  This was her first inpatient psychiatric admission  She has no prior treatment other than a short tried with Celexa and Xanax  No history of suicide attempts or violence  Substance Abuse Hx    Substance Abuse History: She denies any  Active Problems    1  YANCY (generalized anxiety disorder) (300 02) (F41 1)   2  Hypertension (401 9) (I10)   3  Major depressive disorder, single episode, 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)    Surgical History    The surgical history was reviewed and updated today         Current Meds   1  Hydrochlorothiazide 12 5 MG Oral Capsule; TAKE 1 CAPSULE DAILY; Therapy: (Recorded:21Mar2016) to Recorded   2  KlonoPIN 0 5 MG Oral Tablet (ClonazePAM); TAKE 0 5 TABLET Twice daily; Therapy: (Kevin Sethi) to Recorded   3  Lisinopril 10 MG Oral Tablet; TAKE 1 TABLET DAILY AS DIRECTED; Therapy: (Kevin Sethi) to Recorded   4  Paxil 20 MG Oral Tablet (PARoxetine HCl); TAKE 1 TABLET DAILY; Therapy: (Kevin Sethi) to Recorded   5  Zocor 20 MG Oral Tablet (Simvastatin); TAKE 1 TABLET DAILY IN THE EVENING; Therapy: (Recorded:21Mar2016) to Recorded    The medication list was reviewed and updated today  Family Psych History    1  Family history of depression (V17 0) (Z81 8)    The family history was reviewed and updated today  Positive for depression in her mother  Social History    · College graduate   · Never a smoker   · No history of alcohol use   · Retired  The social history was reviewed and updated today  The patient is , lives with her son, retire, Dorian in education and nursing degree  No legal problems  No   History Of Phys/Sex Abuse Or Perpetration    History Of Phys/Sex Abuse or Perpetration: She denied any history of physical or sexual abuse  Physical Exam    Appearance: was calm and cooperative, adequate hygiene and grooming and good eye contact  Observed mood: anxious  Observed mood: affect appropriate  Speech: a normal rate  Thought processes: coherent/organized  Hallucinations: no hallucinations present  Thought Content: no delusions  Abnormal Thoughts: The patient has no suicidal thoughts and no homicidal thoughts  Orientation: The patient is oriented to person, place and time  Recent and Remote Memory: short term memory intact and long term memory intact  Attention Span And Concentration: concentration intact  Insight: Limited insight  Judgment: Her judgment was limited  Muscle Strength And Tone   Muscle strength and tone were normal  Normal gait and station  Language: no difficulty naming common objects, no difficulty repeating a phrase and no difficulty writing a sentence  Fund of knowledge: Patient displays adequate knowledge of current events, adequate fund of knowledge regarding past history and adequate fund of knowledge regarding vocabulary  The patient is experiencing no localized pain  On a scale of 0 - 10 the pain severity is a 0  Treatment Recommendations: 1  Admit to [place] 2  Medication Management 3  Group Therapy  Risks, Benefits And Possible Side Effects Of Medications: Risks, benefits, and possible side effects of medications explained to patient and patient verbalizes understanding  DSM    Provisional Diagnosis: Major Depression Single episode  Generalized Anxiety Disorder    Future Appointments    Date/Time Provider Specialty Site   03/22/2016 10:00 AM ALCIRA Maki  Psychiatry [facility] PARTIAL HOSPITALIZATION   03/23/2016 10:00 AM ALCIRA Maki  Psychiatry [facility] PARTIAL HOSPITALIZATION   03/24/2016 10:00 AM ALCIRA Maki  Psychiatry [facility] PARTIAL HOSPITALIZATION   03/25/2016 10:00 AM ALCIRA Maki  [facility] PARTIAL HOSPITALIZATION     Subjective  ADMIT TO: Partial Hospitalization 5 x per week for 15 days  Vital signs routine  Diet: regular  Group Psychotherapy 9 x per week    Allied Therapy Group 6 x per week     Diagnosis: F 32 0, F 41 1  Medications: As per medication list     âI certify that the continuation of Partial Hospitalization services is medically necessary to improve and/or maintain the patient's condition and functional level, and to prevent relapse or hospitalization, and that this could not be done at a less intensive level of care  â     Physician Signature: Harper Tobar MD     Nurse Signature: Elina Sanabria      Signatures   Electronically signed by : Ryann Fong RN; Mar 21 2016 11:40AM EST                       (Author)    Electronically signed by : ALCIRA Bender ; Mar 21 2016  3:13PM EST                       (Author)

## 2018-01-13 NOTE — MISCELLANEOUS
Message   Recorded as Task   Date: 06/28/2016 08:20 AM, Created By: Jonathan Womack   Task Name: Med Renewal Request   Assigned To: Rachid Chicas   Regarding Patient: Ashley Paul, Status: Active   CommentStarlet Star - 28 Jun 2016 8:20 AM     TASK CREATED  Caller: Self; Renew Medication; [phone] (Home)  PT OF DR VALLADARES  SHE NEEDS REFILL ON CLONAZEPAM 0 5MG ORAL TABLET      WOULD LIKE SCRIPT TO BE SENT TO PHARMACY  [address] [place] [address] - 28 Jun 2016 5:27 PM     TASK REASSIGNED: Previously Assigned To Samanta Chavez left msg requesting Clonazepam  Rx log shows she would need a renewal  RTC appt 8/11/2016   I called in the following Rx to the designated [address]:  Clonazepam 0 5mg (1) tab po bid prn anxiety # 60 R1      Plan  YANCY (generalized anxiety disorder)    · From  ClonazePAM 0 5 MG Oral Tablet TAKE 0 5 TABLET Twice daily To  ClonazePAM 0 5 MG Oral Tablet (KlonoPIN) Take 1 tab po twice daily prn anxiety    Signatures   Electronically signed by : SIMEON Cruz; Jun 28 2016  7:09PM EST                       (Author)

## 2018-01-13 NOTE — PSYCH
History of Present Illness  [facility] Progress Note [facility]:   Specialized Services Documentation - Therapist must complete separate progress note for each specific clinical activity in which the client participated during the day  (562) Group Psychotherapy: ([id number]) Jean Apodaca participated briefly during psychotherapy group focused on stressors and self care  She offered a suggestion to a young peer who struggles with a dysfunctional relationship with her mother, however, she did not share personal self disclosures  Inconsistent progress towards goal  Continue group to offer opportunity to relate to peers around similar issues  Treatment Plan Problem(s): 1 1  Dl Holland LCSW     (658) Group Psychotherapy: [id number] Jean Apodaca participated in wellness group focused on assessment of weekly goal work in each area of functioning; including physical, emotional, cognitive, social and spiritual  Pt shared that she is going to continue working on her WRAP and focus on getting in touch with her emotional triggers as she continues to return to thinking about the event of moving suddenly and the very high level of anxiety that followed that she feels is still affecting her functioning  Jean Apodaca has not clearly identified the circumstances surrounding this enormous change in her level of anxiety  Pt was generally quiet after sharing this information and appeared distracted for the remainder of group  Pt made mild progress toward goal Continue group to educate and encourage patient to identify areas of functioning requiring ongoing attention for healthier functioning  Treatment Plan Problem(s): 1 1  Niko Apodaca RN       (432) Education Therapy Goals set - get hair done    Treatment Plan Problem(s): 1 2  Education Therapy Time - 0900 - 0930 Previous goal was met  Readiness to Learning:  She is receptive to learning  There are  no barriers to learning    Learning Assessment Time - 1330 - 1400   Education completed on  illness and wellness tools  The teaching method was  verbal  Shared area of learning: Yes  FABIO Ambrose     (221) Allied Therapy [id number] Luz Elena Hercules actively shared in [facility] group focused on boundary setting  She engaged in improvisation and discussion exploring value of and ways to set healthy limits with self and others  She shared that setting boundaries can be hard for her because of fear of hurting other people's feelings  Group discussed reality of feeling uncomfortable at times, yet the anxiety and chaos left if one chooses not to set limits  She participated in practicing boundaries with given scenarios  Some positive work toward goal noted  Continue AT to encourage skill development and use  Treatment Plan Problem(s): 1 1, 1 2  FABIO Ambrose       Case Management Note: Individual Case Management visit not provided today  TREATMENT SESSION NUMBER: 10   Current suicide risk is low  Medications not changed/added/denied  Christa De Leon RN      Active Problems    1  YANCY (generalized anxiety disorder) (300 02) (F41 1)   2  Hyperlipidemia (272 4) (E78 5)   3  Hypertension (401 9) (I10)   4  Major depressive disorder, single episode, 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)    Allergies    1  Codeine Derivatives   2  Demerol TABS   3  Sulfa Drugs   4  TraMADol HCl TABS    Current Meds   1  Hydrochlorothiazide 12 5 MG Oral Capsule; TAKE 1 CAPSULE DAILY; Therapy: (Recorded:21Mar2016) to Recorded   2  KlonoPIN 0 5 MG Oral Tablet (ClonazePAM); TAKE 0 5 TABLET Twice daily; Therapy: (Ivan Ou) to Recorded   3  Lisinopril 10 MG Oral Tablet; TAKE 1 TABLET DAILY AS DIRECTED; Therapy: (Ivan Ou) to Recorded   4  Paxil 20 MG Oral Tablet (PARoxetine HCl); TAKE 1 TABLET DAILY; Therapy: (Ivan Ou) to Recorded   5  Zocor 20 MG Oral Tablet (Simvastatin); TAKE 1 TABLET DAILY IN THE EVENING; Therapy: (Ivan Ou) to Recorded    Family Psych History    1   Family history of depression (V17 0) (Z81 8)    Social History    · College graduate   ·    · Never a smoker   · No history of alcohol use   · Retired    Future Appointments    Date/Time Provider Specialty Site   04/01/2016 10:45 AM ALCIRA Davidson  Psychiatry [facility]   04/07/2016 01:30 PM ALCIRA Marie  Psychiatry [facility]   05/10/2016 12:00 PM Dennis Santizo MS, [facility] Psychiatry [facility]     Signatures   Electronically signed by : Liliya Yost RN; Apr 1 2016  7:54AM EST                       (Author)    Electronically signed by : Shannon Luna LCSW; Apr 1 2016  2:01PM EST                       (Author)    Electronically signed by : FABIO Montiel; Apr 1 2016  2:24PM EST                       (Author)    Electronically signed by : Liliya Yost RN; Apr 1 2016  3:42PM EST                       (Author)    Electronically signed by : Liliya Yost RN; Apr 5 2016  3:08PM EST                       (Author)    Electronically signed by : Liliya Yost RN;  Apr 5 2016  3:19PM EST                       (Author)

## 2018-01-14 VITALS
DIASTOLIC BLOOD PRESSURE: 83 MMHG | BODY MASS INDEX: 31.61 KG/M2 | HEART RATE: 64 BPM | HEIGHT: 60 IN | WEIGHT: 161 LBS | SYSTOLIC BLOOD PRESSURE: 144 MMHG

## 2018-01-15 ENCOUNTER — ALLSCRIPTS OFFICE VISIT (OUTPATIENT)
Dept: OTHER | Facility: OTHER | Age: 83
End: 2018-01-15

## 2018-01-15 NOTE — PSYCH
History of Present Illness  [facility] Progress [facility]:   Specialized Services Documentation - Therapist must complete separate progress note for each specific clinical activity in which the client participated during the day  (586) Group Psychotherapy: (0112-2616) Aysha Brar participated with prompt during psychotherapy group focused on coping  She reported she managed her weekend productively, but this morning experienced significant anxiety on the way to program  She related this to becoming worried that she took too much of her prescribed Klonopin, even though she was directed to do so by a staff member last week  She said she did get it straightened out with her  before group  Peers offered supportive feedback and encouragement  Pt  admitted she knows she has been worrying too much about unimportant things  Inconsistent progress towards goal  Continue group to offer opportunity to relate to peers around similar issues  Treatment Plan Problem(s): 1 1  Owen Aparicio LCSW     (386) Group Psychotherapy: [id number] Aysha Brar participated actively in wellness group focused on identifying her individual signs and symptoms of stress and what functional coping strategies she needs to learn, and practice, to reduce stress symptoms  Pt shared that she was fine over the weekend as long as she kept busy with a number of activities with family and friends but on Sunday, when pt had more "down time" she began to worry about her medication and perhaps she was not "doing the correct thing" and became so anxious that when she presented to [facility] this morning and immediately had to see RN to discuss her fears that "I did something wrong " Pt discussed that she knows "I do this to myself" but is not able to reduce stress/anxiety level as yet with behavioral and cognitive strategies  In fact, pt states that sometimes being reassured by others sometimes helps but not always   Peers suggested that pt does not practice self-soothing and self-reassurance and this is something Cnadelario Howe may want to work on  Pt accepted this feedback well  Continue group to educate Candelario Howe on stress reducers that she can practice to decrease high levels of anxiety when she experiences catastrophic or "worry" thinking  Treatment Plan Problem(s): 1 1,1 2  Gaudencio Pittman RN       (666) Education Therapy Goals set - Select one "stress reliever" from handout and practice it    Treatment Plan Problem(s): 1 1  Education Therapy Time - 0900 - 0930 Previous goal was met  Readiness to Learning:  She is receptive to learning  There are  no barriers to learning and anxiety barriers to learning  Learning Assessment Time - 1330 - 1400   Education completed on  illness  The teaching method was  verbal  Shared area of learning: Yes  Chuck Sidhu LCSW     (416) Allied Therapy [phone] Candelario Howe actively shared in [facility] group focused on identification and use of personal strengths  Through song writing task and small group work, patient asked to identify aspects of self and listen to peer feedback  Patient identified her desire to care for others as a personal strength; she shared her struggle to âlet things goâ lately and how she can not get over fears she knows are unrealistic  Group started with a mindfulness exercise and patient encouraged to review like exercise to get through appointment with afternoon she is focused on  Group discussed balance and specific ways to apply strengths to support self during the upcoming week  Inconsistent work toward goal noted  Continue AT to encourage self-awareness and personal role in recovery  Treatment Plan Problem(s): 1 1  FABIO Aly     ( ) Other 1400 CM spoke with pt's insurance reviewerNataliya at [place] today and additional seven days were authorized with LCD 4/5/2016   Gaudencio Pittman RN     Case Management Note:   5599 This RN discussed with Dr Enrike Moran patient's medication order to take 0 25 mg tablet of Klonipin twice daily  This order has not changed and this RN discussed with patient, who was very anxious, and confused, this morning, that she was taking the correct dosage  Kimi Stapleton cancelled appointment with opthamologist again today for a third time stating that she was feeling "too anxious" to go  Pt denied that she is worried about what MD will say regarding her vision issues and stated that she is just in a state of high anxiety and cannot "get through an appointment like this " CM attempted to decrease level of anxiety by utilizing relaxation and breathing but was unable to lower level of anxiety until appointment was cancelled then she described anxiety becoming "more manageable  Pt stated that she is a "perfectionist" and is most worried about "doing the wrong thing "   TREATMENT SESSION NUMBER: 6   Current suicide risk is low  Medications not changed/added/denied  Angelo Santa RN      Active Problems    1  YANCY (generalized anxiety disorder) (300 02) (F41 1)   2  Hyperlipidemia (272 4) (E78 5)   3  Hypertension (401 9) (I10)   4  Major depressive disorder, single episode, 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)    Allergies    1  Codeine Derivatives   2  Demerol TABS   3  Sulfa Drugs   4  TraMADol HCl TABS    Current Meds   1  Hydrochlorothiazide 12 5 MG Oral Capsule; TAKE 1 CAPSULE DAILY; Therapy: (Recorded:21Mar2016) to Recorded   2  KlonoPIN 0 5 MG Oral Tablet; TAKE 0 5 TABLET Twice daily; Therapy: (Javon Frizzle) to Recorded   3  Lisinopril 10 MG Oral Tablet; TAKE 1 TABLET DAILY AS DIRECTED; Therapy: (Javon Frizzle) to Recorded   4  Paxil 20 MG Oral Tablet; TAKE 1 TABLET DAILY; Therapy: (Javon Frizzle) to Recorded   5  Zocor 20 MG Oral Tablet; TAKE 1 TABLET DAILY IN THE EVENING; Therapy: (Javon Frizzle) to Recorded    Family Psych History    1   Family history of depression (V17 0) (Z81 8)    Social History    · College graduate   ·    · Never a smoker   · No history of alcohol use   · Retired    Future Appointments    Date/Time Provider Specialty Site   03/29/2016 11:30 AM ALCIRA Roberto  Psychiatry [facility]   03/30/2016 11:00 AM ALCIRA Roberto  Psychiatry [facility]   03/31/2016 10:45 AM ALCIRA Roberto  Psychiatry [facility]   04/01/2016 10:45 AM ALCIRA Roberto  Psychiatry [facility]   04/07/2016 01:30 PM ALCIRA Jonas  Psychiatry [facility]   05/10/2016 12:00 PM Elena Doss MSW, [place] [id number] THERAPISTS     Signatures   Electronically signed by : FABIO Su; Mar 28 2016 12:23PM EST                       (Author)    Electronically signed by : Carole Crocker LCSW; Mar 28 2016  2:33PM EST                       (Author)    Electronically signed by : Gemini Cid RN; Mar 28 2016  2:58PM EST                       (Author)    Electronically signed by : Gemini Cid RN; Mar 28 2016  4:15PM EST                       (Author)    Electronically signed by : Gemini Cid RN; Mar 28 2016  4:30PM EST                       (Author)    Electronically signed by : Gemini Cid RN;  Apr 5 2016  3:14PM EST                       (Author)

## 2018-01-15 NOTE — PSYCH
Psych Med Mgmt    Appearance: was calm and cooperative, adequate hygiene and grooming and good eye contact  Observed mood: euthymic, but mood appropriate  Observed mood: affect was broad, but affect appropriate  Speech: a normal rate and fluent  Thought processes: coherent/organized  Hallucinations: no hallucinations present  Thought Content: no delusions  Abnormal Thoughts: The patient has no suicidal thoughts and no homicidal thoughts  Orientation: The patient is oriented to person, place and time  Recent and Remote Memory: short term memory intact and long term memory intact  Attention Span And Concentration: concentration intact  Insight: Insight intact  Judgment: Her judgment was intact  Muscle Strength And Tone  Normal gait and station  Language: no difficulty naming common objects and no difficulty repeating a phrase  Fund of knowledge: Patient displays adequate knowledge of current events, adequate fund of knowledge regarding past history and adequate fund of knowledge regarding vocabulary  The patient is experiencing no localized pain  Treatment Recommendations: Pt is doing well on present regimen which I will continue unchanged  Pt accepts the plan  Continue:  Clonazepam 0 5mg (1/2) tab po qd # 15 R3  Paroxetine 20mg (1) tab po qd # 90 R1  Return 4 months, sooner prn    Risks, Benefits And Possible Side Effects Of Medications: Risks, benefits, and possible side effects of medications explained to patient and patient verbalizes understanding  Discussed with patient the risks of sedation, respiratory depression, impairment of ability to drive and potential for abuse and addiction related to treatment with benzodiazepine medications  The patient understands risk of treatment with benzodiazepine medications, agrees to not drive if feels impaired and agrees to take medications as prescribed          The patient has been filling controlled prescriptions on time as prescribed to [place] [facility] program     She reports normal appetite, normal energy level, no weight change and normal number of sleep hours  Kj Ferrera is a 81 y/o female here for medication review with primary statement of "I feel good really" and has been keeping very busy with volunteer work at [employer], [employer]s [employer] friends, going out with other friends, shopping, and playing cards  She anticipates a family wedding this coming weekend  She handled her recent cataract surgery very well and did not get anxious  Pt presently denies depression, SI, HI, anxiety, panic attacks, or manic or psychotic Sxs  Vitals  Signs   Recorded: [id number] 09:51AM   Heart Rate: 62  Systolic: 886, LUE, Sitting  Diastolic: 65, LUE, Sitting  Height: 5 ft   Weight: 158 lb   BMI Calculated: 30 86  BSA Calculated: 1 69    Assessment    1  YANCY (generalized anxiety disorder) (300 02) (F41 1)   2  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)    Plan    1  ClonazePAM 0 5 MG Oral Tablet (KlonoPIN); Take 1/2 tab po once daily for anxiety    2  PARoxetine HCl - 20 MG Oral Tablet (Paxil); TAKE 1 TABLET DAILY    Review of Systems    Constitutional: No fever, no chills, feels well, no tiredness, no recent weight gain or loss  Cardiovascular: no complaints of slow or fast heart rate, no chest pain, no palpitations  Respiratory: no complaints of shortness of breath, no wheezing, no dyspnea on exertion  Gastrointestinal: no complaints of abdominal pain, no constipation, no nausea, no diarrhea, no vomiting  Genitourinary: no complaints of dysuria, no incontinence, no pelvic pain, no urinary frequency  Musculoskeletal: no complaints of arthralgia, no myalgias, no limb pain, no joint stiffness  Integumentary: no complaints of skin rash, no itching, no dry skin  Neurological: no complaints of headache, no confusion, no numbness, no dizziness        Past Psychiatric History    Past Psychiatric History: Pt was first diagnosed with a psychiatric illness (depression and anxiety) during an admission to the [facility] for severe Sxs 3/11/2016 - 3/17/2016  Sxs were sad mood, anhedonia, reduced appetite, insomnia, hopelessness, and passive death wish but no intent or plan of suicide  Sxs started around the holidays of 2015 and became severe within 2 months of admission when Pt moved to a new apartment  Pt also had Sxs of anxiety for at least 5-6 weeks prior to admission with worry over moving primarily  She realized her financial worries were unfounded  She moved back in with one of her sons after discharge  After discharge, Pt was enrolled in the [facility] from 3/21/2016 - 4/5/2016  She continued the same discharge medicines, Paxil and Clonazepam until outpatient f/u  She was first seen for outpatient eval by Dr Rigo Bran 4/7/2016  She started seeing Mrs Williamson in 5/10/2016 for psychotherapy and it was stopped by mutual decision after 7/18/2016 visit  No other hospitalizations  She denies any h/o suicide attempts, self-mutilating/other self harm behaviors, violent behavior, ECT, milana legal or  Hx  Substance Abuse Hx    Substance Abuse History: Pt denies any h/o ETOH or illicit drug use or abuse  Active Problems    1  YANCY (generalized anxiety disorder) (300 02) (F41 1)   2  Hyperlipidemia (272 4) (E78 5)   3  Hypertension (401 9) (I10)    Past Medical History    1  Hypertension (401 9) (I10)    The active problems and past medical history were reviewed and updated today  Allergies    1  Codeine Derivatives   2  Demerol TABS   3  Sulfa Drugs   4  TraMADol HCl TABS    Current Meds   1  ClonazePAM 0 5 MG Oral Tablet; Take 1/2 tab po once daily for anxiety; Last   Rx:03Jan2017 Ordered   2  HydroCHLOROthiazide 12 5 MG Oral Capsule; TAKE 1 CAPSULE DAILY; Therapy: (Hesham Monroe) to Recorded   3   Lisinopril 10 MG Oral Tablet; TAKE 1 TABLET DAILY AS DIRECTED; Therapy: (Hesham Monroe) to Recorded   4  PARoxetine HCl - 20 MG Oral Tablet; TAKE 1 TABLET DAILY  Requested for: [id number];   Last ON:[id number] Ordered   5  Zocor 20 MG Oral Tablet; TAKE 1 TABLET DAILY IN THE EVENING; Therapy: (Recorded:21Mar2016) to Recorded    The medication list was reviewed and updated today  Family Psych History  Mother    1  Family history of depression (V17 0) (Z81 8)    The family history was reviewed and updated today  Social History    · Ep[employer] activities   · [employer] graduate   ·    · Home   · Loss or death of child (V61 07) (Z63 4)   · Never a smoker   · No history of alcohol use   · Number of children   · Recreational activities   · Retired   · Volunteer work  The social history was reviewed and updated today  The social history was reviewed and is unchanged  No change as of 3/30/2017      History Of Phys/Sex Abuse Or Perpetration    History Of Phys/Sex Abuse or Perpetration: Pt denies any h/o childhood physical or sexual abuse  Education      Pt denies any h/o learning disabilities and reached childhood milestones on time as far as she knows  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[place] [address]    1  ClonazePAM 0 5 MG Oral Tablet (KlonoPIN); Take 1/2 tab po once daily for anxiety; Last   Rx:30Mar2017 Ordered    2  Zocor 20 MG Oral Tablet (Simvastatin); TAKE 1 TABLET DAILY IN THE EVENING; Therapy: (Hesham Monroe) to Recorded    3  HydroCHLOROthiazide 12 5 MG Oral Capsule; TAKE 1 CAPSULE DAILY; Therapy: (Hesham Monroe) to Recorded   4  Lisinopril 10 MG Oral Tablet; TAKE 1 TABLET DAILY AS DIRECTED; Therapy: (Hesham Monroe) to Recorded    5   PARoxetine HCl - 20 MG Oral Tablet (Paxil); TAKE 1 TABLET DAILY  Requested for:   [id number]; Last Rx:30Mar2017 Ordered    Future Appointments    Date/Time Provider Specialty Site   07/25/2017 11:00 AM SIMEON Arambula Psychiatry [facility]     Signatures   Electronically signed by : SIMEON Persaud; Mar 30 2017  9:58AM EST                       (Author)    Electronically signed by : ALCIRA Hunt ; Mar 30 2017 12:46PM EST                       (Author)

## 2018-01-15 NOTE — DISCHARGE SUMMARY
Discharge Summary  [facility] Discharge Summary [facility]:   Admission Date: 3/21/2016  Patient was referred by [facility]  Discharge Date: 4/5/2016  This was a routine discharge  Diagnosis: Axis I: YANCY Generalized Anxiety Disorder F41 1, Major depressive disorder, single episode, mild F32 0  Treating Physician: Dr Ivanna Hickman MD    Treatment Complications: Patient verbalized fear and high level of anxiety related to unknown as to how PHP could help her with depression and anxiety  Presenting Problem: Catarino Gregg was referred to [facility] from [facility] AT [address] where she was admitted 3/11 to 3/17/16 and treated for severe depression and anxiety that had increased over the prior two months, per pt report due to a sudden move she made out of senior housing, where she had resided for more than 14 years, to an apartment alone  Catarino Gregg related she may be better dead but denied suicidal ideation or plan and denied homicidal ideation or plan  Pt did report anhedonia, constant worry about "everything" including the future, poor concentration and memory, decreased appetite, feelings of guilt that she was burdening family that were more than willing to have patient stay with them  Course of treatment includes group counseling, pharmacotherapy, individual case management, allied therapy, psychoeducation, psychiatric evaluation and family counseling  Treatment Progress: Patient completed 12 treatment days and a decline in her high level of anxiety was evident upon D/C  Pt did not experience suicidal or homicidal ideation while in Program  Pt remained anxious primarily about the "unknown" she stated but was able to utilize Program education and practice with cognitive and behavioral techniques to reassure herself and decrease level of anxiety to manageable levels  Pt was compliant with medication   Catarino Gregg related that this mental health treatment was her first experience with inpatient and outpatient behavioral health treatment and she feels that it has helped her begin recovery to moving back eventually to live independent from family  On D/C she had begun to drive again short distances, cook and make plans to socialize with one friend  Aftercare recommendations include  OP Providers  Discharge Medications include: See Attached  Discharge [address] Discharge Instructions [facility]:   Address: [address] [place] [address], [address] [place] [address]  Diagnosis: YANCY (Generalized anxiety disorder (F41 1), Major depressive disorder, single episode, mild (F32 0)  Allergies (Drug/Food): Codeine, Demerol  Activity: you may not return to work until Retired, but you may drive  Diet: Regular  Vaccines: If you received a vaccine, please notify your family physician on your next visit  Pneumococcal vaccine not given, Influenza vaccine not given  For more information, please call [phone]  Follow-up appointments/Referrals:   Dr Katie Maher - Appointment on 4/7/2016 at 1:30PM - Outpatient Psychiatrist at [address] (Same Building As "[facility] [address]  Anum Campos LCSW Appointment on 5/10/16 at [address] also at [facility] (Same Building As Above, [address])  [address] [facility] Jevon Berrios, [address] [facility] has paperwork to complete and has been instructed to bring to first outpatient appointment  ICM/CTT: Support group information reviewed and handouts given to patient on discharge  [facility]: Jevon [phone]  Intake/Referral/Evaluation (Non-Emergency) *NON INSURED FOR FUNDING: TELLY: [phone]  Crisis Intervention (Emergency) [employer]: [place]: [phone]  National Crisis Intervention Hotline: 1-216.761.4197  National Suicide Crisis Hotline: 6-647.663.1307         I the undersigned, have received and understand the above instructions  Patient/Rep Signature: __________________________________ Date/Time: ______________       Physician Signature: ____________________________________ Date/Time: ______________        Signature: ________________________________ Date/Time: ______________          Active Problems    1  YANCY (generalized anxiety disorder) (300 02) (F41 1)   2  Hyperlipidemia (272 4) (E78 5)   3  Hypertension (401 9) (I10)   4  Major depressive disorder, single episode, 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)    Social History    · College graduate   ·    · Never a smoker   · No history of alcohol use   · Retired    Current Meds   1  Hydrochlorothiazide 12 5 MG Oral Capsule; TAKE 1 CAPSULE DAILY; Therapy: (Recorded:21Mar2016) to Recorded   2  KlonoPIN 0 5 MG Oral Tablet; TAKE 0 5 TABLET Twice daily; Therapy: (Kwan Iba) to Recorded   3  Lisinopril 10 MG Oral Tablet; TAKE 1 TABLET DAILY AS DIRECTED; Therapy: (Kwan Iba) to Recorded   4  Paxil 20 MG Oral Tablet; TAKE 1 TABLET DAILY; Therapy: (Kwan Iba) to Recorded   5  Zocor 20 MG Oral Tablet; TAKE 1 TABLET DAILY IN THE EVENING; Therapy: (Recorded:21Mar2016) to Recorded    Allergies    1  Codeine Derivatives   2  Demerol TABS   3  Sulfa Drugs   4  TraMADol HCl TABS    Future Appointments    Date/Time Provider Specialty Site   03/25/2016 10:00 AM ALCIRA Koroma  Psychiatry [facility] PARTIAL HOSPITALIZATION   04/07/2016 01:30 PM ALCIRA Garsia  Psychiatry [facility]CHI[facility]   05/10/2016 12:00 PM MOODY Goldstein, [facility]     Signatures   Electronically signed by : Ishmael Landau, RN; Mar 24 2016  9:36AM EST                       (Author)    Electronically signed by : Ishmael Landau, RN; Apr 4 2016  9:37AM EST                       (Author)    Electronically signed by : Ishmael Landau, RN;  Apr 5 2016  3:58PM EST (Author)    Electronically signed by : ALCIRA Almanza ; Jun 7 2016  4:27PM EST                       (Author)

## 2018-01-16 NOTE — PSYCH
History of Present Illness  [facility] Clinical Progress Note [facility]:   Specialized Services Documentation - Therapist must complete separate progress note for each specific clinical activity in which the client participated during the day  (738) Group Psychotherapy: (1723-0386) Suzanne Bobo participated in weekly wellness group focused on physical, emotional, cognitive, vocational, social and spiritual attributes followed by reading inner peace card  she chose cognitive component to share and stated she always prioritize needs but not always good in meeting goals, verbalized never get into conflict with any one, adjust her attitude and think before her actions  Peers offered positive feedback and compliments, also suggested to make small realistic attainable goals  She remained engaged in discussion  Moderate progress toward goals noted  Continue group to educate wellness tools to care for herself to increase healthy functioning and prevent decompensation  Jennifer Bullock RN  Treatment Plan Problem(s): 1 5,1 6      (306) Group Psychotherapy: Suzanne Bobo participated during psychotherapy group focused on coping  She reported she felt anxious and related this to an appointment on Monday with her eye doctor  She reported she worries she will lose control and make a fool out of herself  Discussed difference between rational vs  irrational thoughts and how irrational thoughts fuel anxiety  Peers offered pt  appropriate feedback and support, encouraging utilization of thought stopping techniques and distraction  Some progress towards goal  Continue group to offer opportunity to relate to peers around similar issues  Treatment Plan Problem(s): 1 1  Randie Babinski, LCSW       (222) Education Therapy Goals set - Enjoy dinner and reach out to at least 2 people over the weekend    Treatment Plan Problem(s): 1 1, 1 2  Education Therapy Time - 0900 - 0930 Previous goal was met  Readiness to Learning:   She is receptive to learning  There are  no barriers to learning  Learning Assessment Time - 1330 - 1400   Education completed on  illness and wellness tools  The teaching method was  verbal  Shared area of learning: Yes  Terra Michelle, [facility]     (200) Allied Therapy 4312-3268 Florecita Chambers actively shared in [facility] group focused on self-awareness  Engaged in [address] discussion and task exploring aspects of self and tasks they find fulfilling  Patient shared in her âfulfillment pyramidâ that her family, friends and learning are meaningful, important tasks she can engage in to assist in her self-care and wellness  Patient encouraged to use self-awareness as a guide for upcoming unstructured time  Patient identified spending time with friends would be fulfilling to her this weekend and would like to reach out to more folks this weekend (as she feels she was isolating)  Positive steps toward goal noted  Continue AT to encourage personal awareness to her role in wellness and recovery  Treatment Plan Problem(s): 1 1, 1 2  WERNER WhitneyBC       Case Management Note:   (2956-2148) Met with Florecita Chambers to discuss weekend plans and progress  She had plan to visit and stay with her Son on Friday and Saturday and stated excited as her grand son will be there for spring break  Sunday has plan with other son for dinner, looking forward but same time expressed her anxiety regarding Ophthalmologist visit after programme , mentioned its follow up visit regarding questionable retina detachment in past which was not found at that time and also think there is no issue at this time  She remained preoccupied with her anxiety and too much worries and also had unknown fear of Ophthalmologist visit, writer reviewed her medications and found that Florecita Chambers is taking 0 25 mg Klonopin twice daily instead of 0 5 mg Klonopin twice daily   Writer notified Dr Glo Arreola same and as per Dr Glo Arreola' s advise explained to Florecita Chambers to take 0 5 mg Klonopin twice starting from Today's evening dose and if gets too much sleepy or drowsy report to  on Monday for further adjustment  Sreekanth Gerardo RN   TREATMENT SESSION NUMBER: 5   Current suicide risk is low  Medications not changed/added/denied  Active Problems    1  YANCY (generalized anxiety disorder) (300 02) (F41 1)   2  Hyperlipidemia (272 4) (E78 5)   3  Hypertension (401 9) (I10)   4  Major depressive disorder, single episode, 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)    Allergies    1  Codeine Derivatives   2  Demerol TABS   3  Sulfa Drugs   4  TraMADol HCl TABS    Current Meds   1  Hydrochlorothiazide 12 5 MG Oral Capsule; TAKE 1 CAPSULE DAILY; Therapy: (Recorded:21Mar2016) to Recorded   2  KlonoPIN 0 5 MG Oral Tablet; TAKE 0 5 TABLET Twice daily; Therapy: (Saralee Pagoda) to Recorded   3  Lisinopril 10 MG Oral Tablet; TAKE 1 TABLET DAILY AS DIRECTED; Therapy: (Saralee Pagoda) to Recorded   4  Paxil 20 MG Oral Tablet; TAKE 1 TABLET DAILY; Therapy: (Saralee Pagoda) to Recorded   5  Zocor 20 MG Oral Tablet; TAKE 1 TABLET DAILY IN THE EVENING; Therapy: (Saralee Pagoda) to Recorded    Family Psych History    1  Family history of depression (V17 0) (Z81 8)    Social History    · College graduate   ·    · Never a smoker   · No history of alcohol use   · Retired    Future Appointments    Date/Time Provider Specialty Site   03/28/2016 10:45 AM ALCIRA Bailey  Psychiatry [facility] PARTIAL HOSPITALIZATION   04/07/2016 01:30 PM ALCIRA Garcia   Psychiatry [facility]CHIAT[facility]IC [facility]   05/10/2016 12:00 PM MOODY Warren, [facility] Psychiatry [facility]     Signatures   Electronically signed by : Sreekanth Gerardo RN; Mar 25 2016 12:50PM EST                       (Author)    Electronically signed by : Sreekanth Gerardo RN; Mar 25 2016 12:59PM EST                       (Author)    Electronically signed by : Beverly Dillard LCSW; Mar 25 2016  1:08PM EST (Author)    Electronically signed by : FABIO Reveles; Mar 25 2016  2:32PM EST                       (Author)    Electronically signed by : Kiki Corey RN;  Apr 5 2016  3:14PM EST                       (Author)

## 2018-01-17 NOTE — PSYCH
History of Present Illness  [facility] Clinical Progress Note [facility]:   Specialized Services Documentation - Therapist must complete separate progress note for each specific clinical activity in which the client participated during the day  (363) Group Psychotherapy: ([id number]) Florecita Chambers participated only during the introductory phase of psychotherapy group focused on stressors  She shared she went to the [employer] and got her hair cut and colored which she felt good about  She denied becoming anxious prior to the appointment  Peers offered support and validation  Some progress towards goal  Continue group to offer opportunity to relate to peers around similar issues  Treatment Plan Problem(s): 1 1  Drew Rae LCSW     (532) Group Psychotherapy: 8977-5729 Florecita Chambers participated in therapeutic education session on utilizing journaling and other non-writing techniques as cognitive and behavioral tools to use for recovery, and to track medication response  Pt shared that she has thought about writing a life review or autobiography about her life as now she is retired and looking back over her life she realizes how full her life has been of many events  She stated how grateful she is for the sons she raised, on her own, having been  over 28 years  Florecita Chambers stated that she is very touched by the recent generosity of her sons, and their wives, in taking care of her when she was D/C'd from inpatient [facility] and now while she attends Program  Pt made good progress toward goal  Continue group to educate pt on cognitive and behavioral strategies to cope more effectively in journey to recovery  Treatment Plan Problem(s): 1 1, 1 4  Chao Dia RN       (649) Education Therapy Goals set - complete relapse prevention plan    Treatment Plan Problem(s): 1 1  Education Therapy Time - 0900 - 0930 Previous goal was met  Readiness to Learning:  She is receptive to learning     There are  no barriers to learning  Learning Assessment Time - 1330 - 1400   Education completed on  illness and wellness tools  The teaching method was  verbal  Shared area of learning: Yes  FABIO Whitney     (026) Allied Therapy 8970-6024 Florecita Chambers actively shared in [facility] group focused on worry control  Group explored ânormalcyâ of worry and different strategies to refocus and add perspective to concerns  She was active in discussion and felt it was helpful to learn to not âstop worryingâ but to learn to [facility] or manage them  She identified a way she planned to turn worry into action tonight related to a health issues  Relaxation technique reviewed  Progress toward tx goal noted  Continue AT to encourage identification of stressors and wellness tools to manage anxiety and stress  Treatment Plan Problem(s): 1 1  FABIO Whitney       Case Management Note:   [id number] [place] met with CM and discussed completing Relapse Prevention Plan in preparation for D/C  Also, discussed was patient's conversation over the weekend with her son and daughter-in-law that she is now staying with  Both her family, as well as Florecita Chambers, were trying to find a reason that she rushed into getting an apt and moving out of senior housing that pt identifies as a trigger event for her first ever mental health problems  Pt states it did help to discuss the circumstances of the precipitating event as she has begun to think that she was feeling very overextended in helping her neighbors in the senior housing and feeling very unappreciated and even hurt  Pt stated that it is time to get back to socializing with her friends and stop isolating as she has been since this event  Florecita Chambers stated that she feels she can see a movie with one friend who has reached out to her but does not feel ready to play cards as the group she played with play seven games, at one time, and this would be too taxing   Pt states she sees an improvement in her memory and is not sure if it is the antidepressant that is helping or other interventions but she felt relieved by this  D/C arvinmosyeda  TREATMENT SESSION NUMBER: 11   Current suicide risk is low  Medications not changed/added/denied  Gemini Cid RN      Active Problems    1  YANCY (generalized anxiety disorder) (300 02) (F41 1)   2  Hyperlipidemia (272 4) (E78 5)   3  Hypertension (401 9) (I10)   4  Major depressive disorder, single episode, 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)    Allergies    1  Codeine Derivatives   2  Demerol TABS   3  Sulfa Drugs   4  TraMADol HCl TABS    Current Meds   1  Hydrochlorothiazide 12 5 MG Oral Capsule; TAKE 1 CAPSULE DAILY; Therapy: (Recorded:21Mar2016) to Recorded   2  KlonoPIN 0 5 MG Oral Tablet; TAKE 0 5 TABLET Twice daily; Therapy: (Estela Cristi) to Recorded   3  Lisinopril 10 MG Oral Tablet; TAKE 1 TABLET DAILY AS DIRECTED; Therapy: (Estela Cristi) to Recorded   4  Paxil 20 MG Oral Tablet; TAKE 1 TABLET DAILY; Therapy: (Estela Cristi) to Recorded   5  Zocor 20 MG Oral Tablet; TAKE 1 TABLET DAILY IN THE EVENING; Therapy: (Estela Cristi) to Recorded    Family Psych History    1  Family history of depression (V17 0) (Z81 8)    Social History    · College graduate   ·    · Never a smoker   · No history of alcohol use   · Retired    Future Appointments    Date/Time Provider Specialty Site   04/05/2016 10:45 AM ALCIRA Roberto  Psychiatry [facility] PARTIAL HOSPITALIZATION   04/06/2016 10:15 AM ALCIRA Roberto  Psychiatry [facility] PARTIAL HOSPITALIZATION   04/07/2016 10:15 AM ALCIRA Roberto  Psychiatry [facility] PARTIAL HOSPITALIZATION   04/08/2016 10:30 AM ALCIRA Roberto  Psychiatry [facility] PARTIAL HOSPITALIZATION   04/07/2016 01:30 PM ALCIRA Jonas   Psychiatry [facility]CHIATRIC [facility]   05/10/2016 12:00 PM [facility]oss [facility]     Signatures   Electronically signed by : Jeff Mireles LCSW; Apr 4 2016  2:15PM EST                       (Author)    Electronically signed by : FABIO Hamilton; Apr 4 2016  2:29PM EST                       (Author)    Electronically signed by : Meenu Alcocer RN; Apr 4 2016  3:57PM EST                       (Author)    Electronically signed by : Meenu Alcocer RN;  Apr 5 2016  3:18PM EST                       (Author)

## 2018-01-17 NOTE — PSYCH
Psych Med Mgmt    Appearance: was calm and cooperative, adequate hygiene and grooming and good eye contact  Observed mood: euthymic and anxious, but mood appropriate  Observed mood: affect was broad, but affect appropriate  Speech: a normal rate, speech soft and fluent  Thought processes: coherent/organized  Hallucinations: no hallucinations present  Thought Content: no delusions  Abnormal Thoughts: The patient has no suicidal thoughts and no homicidal thoughts  Orientation: The patient is oriented to person, place and time  Recent and Remote Memory: short term memory intact and long term memory intact  Attention Span And Concentration: concentration intact  Insight: Insight intact  Judgment: Her judgment was intact  Muscle Strength And Tone  Muscle strength and tone were normal  Normal gait and station  Language: no difficulty naming common objects and no difficulty repeating a phrase  Fund of knowledge: Patient displays adequate knowledge of current events and adequate fund of knowledge regarding past history  The patient is experiencing no localized pain  Treatment Recommendations: See plan  Risks, Benefits And Possible Side Effects Of Medications: Risks, benefits, and possible side effects of medications explained to patient and patient verbalizes understanding  Discussed with patient the risks of sedation, respiratory depression, impairment of ability to drive and potential for abuse and addiction related to treatment with benzodiazepine medications  The patient understands risk of treatment with benzodiazepine medications, agrees to not drive if feels impaired and agrees to take medications as prescribed  The patient has been filling controlled prescriptions on time as prescribed to [facility]     She reports normal appetite, normal energy level, no weight change and normal number of sleep hours     Pool December is a 69y/o female here for medication review with primary c/o "I was a little concerned in the beginning for both " Pt will have cataract surgery of Lt eye on 10/26/2016  She will also see the dermatologist 10/18/2016 for a lesion at left side of nose which is likely basal cell carcinoma  Her anxiety has subsided to great degree and Clonazepam helps  She drove her on her own today without a problem  Pt presently denies depression, SI, HI, panic attacks, or manic or psychotic Sxs  Pt reports compliance to medicines without SE  She is still socially active and increased her volunteer hours at [employer]  The Fall season and holidays are not stressful for her  Results/Data  PHQ-9 Adult Depression Screening [id number] 11:15AM John Calixto     Test Name Result Flag Reference   PHQ-9 Adult Depression Score 0     Q1: 0, Q2: 0, Q3: 0, Q4: 0, Q5: 0, Q6: 0, Q7: 0, Q8: 0, Q9: 0   PHQ-9 Adult Depression Screening Negative     PHQ-9 Difficulty Level Not difficult at all     PHQ-9 Severity No Depression         Vitals  Signs   Recorded: [id number] 23:23DG   Systolic: 411, LUE  Diastolic: 77, LUE  Heart Rate: 58  Height: 5 ft   Weight: 157 lb 12 8 oz  BMI Calculated: 30 82  BSA Calculated: 1 69    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)    Plan    1  From  ClonazePAM 0 5 MG Oral Tablet Take 1/2 tab po twice daily anxiety To   ClonazePAM 0 5 MG Oral Tablet (KlonoPIN) Take 1/2 tab po twice daily for anxiety    Discussed her Sx and Tx options  Pt's anxiety is mild and it as well as mood is under control  I will continue the present regimen and advise diligence to f/u for her medical issues  Pt verbalized understanding and acceptance of the plan  Paroxetine 20mg (1) tab po qd--Pt has refills on prior Rx  Clonazepam 0 5mg (1/2) tab po bid for anxiety # 30 R2  Pt to f/u with ophthalmologist and dermatologist this month as sched    Return 3 months, sooner prn     Review of Systems    Constitutional: No fever, no chills, feels well, no tiredness, no recent weight gain or loss  Cardiovascular: no complaints of slow or fast heart rate, no chest pain, no palpitations  Respiratory: no complaints of shortness of breath, no wheezing, no dyspnea on exertion  Gastrointestinal: no complaints of abdominal pain, no constipation, no nausea, no diarrhea, no vomiting  Genitourinary: no complaints of dysuria, no incontinence, no pelvic pain, no urinary frequency  Musculoskeletal: no complaints of arthralgia, no myalgias, no limb pain, no joint stiffness  Integumentary: as noted in HPI  Neurological: no complaints of headache, no confusion, no numbness, no dizziness  Other Symptoms: Lt eye cataract  Past Psychiatric History    Past Psychiatric History: Pt was first diagnosed with a psychiatric illness (depression and anxiety) during an admission to the [place] [address]  for severe Sxs 3/11/2016 - 3/17/2016  Sxs were sad mood, anhedonia, reduced appetite, insomnia, hopelessness, and passive death wish but no intent or plan of suicide  Sxs started around the holidays of 2015 and became severe within 2 months of admission when Pt moved to a new apartment  Pt also had Sxs of anxiety for at least 5-6 weeks prior to admission with worry over moving primarily  She realized her financial worries were unfounded  She moved back in with one of her sons after discharge  After discharge, Pt was enrolled in the [facility] from 3/21/2016 - 4/5/2016  She continued the same discharge medicines, Paxil and Clonazepam until outpatient f/u  She was first seen for outpatient eval by Dr Aneesh Goldman 4/7/2016  She started seeing Mrs Williamson in 5/10/2016 for psychotherapy and it was stopped by mutual decision after 7/18/2016 visit  No other hospitalizations   She denies any h/o suicide attempts, self-mutilating/other self harm behaviors, violent behavior, ECT, milana legal or  Hx  Substance Abuse Hx    Substance Abuse History: Pt denies any h/o ETOH or illicit drug use or abuse  Active Problems    1  YANCY (generalized anxiety disorder) (300 02) (F41 1)   2  Hyperlipidemia (272 4) (E78 5)   3  Hypertension (401 9) (I10)   4  Major depressive disorder, single episode, 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)    Past Medical History    1  Hypertension (401 9) (I10)    The active problems and past medical history were reviewed and updated today  Allergies    1  Codeine Derivatives   2  Demerol TABS   3  Sulfa Drugs   4  TraMADol HCl TABS    Current Meds   1  ClonazePAM 0 5 MG Oral Tablet; Take 1/2 tab po twice daily anxiety; Last Rx:11Aug2016   Ordered   2  Hydrochlorothiazide 12 5 MG Oral Capsule; TAKE 1 CAPSULE DAILY; Therapy: (Khushia Eglin) to Recorded   3  Lisinopril 10 MG Oral Tablet; TAKE 1 TABLET DAILY AS DIRECTED; Therapy: (Bethena Eglin) to Recorded   4  PARoxetine HCl - 20 MG Oral Tablet; TAKE 1 TABLET DAILY  Requested for: [id number];   Last Rx:07Apr2016 Ordered   5  Zocor 20 MG Oral Tablet; TAKE 1 TABLET DAILY IN THE EVENING; Therapy: (Recorded:21Mar2016) to Recorded    The medication list was reviewed and updated today  Family Psych History  Mother    1  Family history of depression (V17 0) (Z81 8)    The family history was reviewed and updated today  Social History    · Pentecostalism activities   · [employer] graduate   ·    · Home   · Loss or death of child (V61 07) (Z63 4)   · Never a smoker   · No history of alcohol use   · Number of children   · Recreational activities   · Retired   · Volunteer work  The social history was reviewed and updated today  The social history was reviewed and is unchanged  History Of Phys/Sex Abuse Or Perpetration    History Of Phys/Sex Abuse or Perpetration: Pt denies any h/o childhood physical or sexual abuse        Education  Pt denies any h/o learning disabilities and reached childhood milestones on time as far as she knows  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[place] [address]    1  ClonazePAM 0 5 MG Oral Tablet (KlonoPIN); Take 1/2 tab po twice daily for anxiety; Last   Rx:11Oct2016 Ordered    2  Zocor 20 MG Oral Tablet (Simvastatin); TAKE 1 TABLET DAILY IN THE EVENING; Therapy: (Jorene Feeling) to Recorded    3  Hydrochlorothiazide 12 5 MG Oral Capsule; TAKE 1 CAPSULE DAILY; Therapy: (Jorene Feeling) to Recorded   4  Lisinopril 10 MG Oral Tablet; TAKE 1 TABLET DAILY AS DIRECTED; Therapy: (Jorene Feeling) to Recorded    5   PARoxetine HCl - 20 MG Oral Tablet (Paxil); TAKE 1 TABLET DAILY  Requested for:   07Apr2016; Last Rx:07Apr2016 Ordered    Future Appointments    Date/Time Provider Specialty Site   01/03/2017 11:00 AM SIMEON Garcia Psychiatry [facility] 81     Signatures   Electronically signed by : SIMEON Díaz; Oct 11 2016 11:32AM EST                       (Author)    Electronically signed by : ALCIRA Almaraz ; Oct 11 2016 11:36AM EST                       (Author)

## 2018-01-17 NOTE — PSYCH
History of Present Illness  [facility] Clinical Progress Note [facility]:   Specialized Services Documentation - Therapist must complete separate progress note for each specific clinical activity in which the client participated during the day  (426) Group Psychotherapy: ([id number]) Suzanne Bobo was excused from psychotherapy group due to meeting with psychiatrist and   Treatment Plan Problem(s): N/A  Randie Babinski, LCSW     (234) Group Psychotherapy: [id number] Suzanne Bobo participated in wellness group focused on identifying individual fears and how each individuals mg in healthy and unhealthy ways with their fears  Pt shared that she has been isolating and not leaving the house the past few months  She stated that she was very apprehensive to leave the house this weekend but she did attend an [facility] with family members and she did fine  She stated that she was afraid of her anxiety and not being able to stay but once she was there she was able to focus on the activities and this decreased her anxiety level  Pt made good initial progress toward goal  Continue group to educate patient on healthier strategies to cope with fears to aid in recovery from anxiety and depression to increase to a higher level of daily functioning  Treatment Plan Problem(s): 1 1  Deandre Pastrana, RN       (182) Education Therapy Goals set - To do a chore at home    Treatment Plan Problem(s): 1 1  Education Therapy Time - 0900 - 0930, Time First treatment day   Readiness to Learning:  She is receptive to learning  There are  no barriers to learning  Learning Assessment Time - 1330 - 1400   Education completed on  illness, medication and wellness tools  The teaching method was  verbal  Shared area of learning: Yes  FABIO Ma     (766) Allied Therapy 3922-6244 Suzanne Bobo was active during [facility] group focused on change and roadblocks to change  During jairo discussion, pt identified feeling overwhelmed   Group discussed components of pre-contemplative stage of change  Self- worth, ability and urgency explored as aspects of motivation toward change  Patient identified making changes recently that have led her to be afraid to make further change  Some initial exploration of goal  Continue AT to encourage self -awareness and personal role in wellness  Treatment Plan Problem(s): 1 1  Moise Mckeon [facility]       Case Management Note:   [id number] Elmon Spatz met with CM to complete initial evaluation  Pt signed ROIs for her PCP and youngest son, Haresh Taylor  Pt became upset when CM discussed OP providers for therapy and medication and she stated that it is overwhelming for her to make these arrangements but that her son, who she now lives with, Haresh Taylor would assist her  Pt stated that she is currently not driving due to being on psychotropic medications and takes [address] to/from Program but is worried how she will get to OP provider appointments  Elmon Spatz stated that she did not want to be a burden to anyone, especially her son, who will be starting a new job at the beginning of April  CM contacted [facility] and requested list of OP providers for pt and son to begin contacting  Pt again became anxious when this topic was discussed  Pt was asked to contact her PCP to discuss medication monitoring until she can make an OP psychiatrist appointment  [facility] program explained to Elmon Spatz and all topics discussed were written down, at pt's request for follow up, especially arranging OP providers  TREATMENT SESSION NUMBER: 1   Current suicide risk is low  Medications not changed/added/denied  Justin Geller RN      Current Meds   1  Hydrochlorothiazide 12 5 MG Oral Capsule; TAKE 1 CAPSULE DAILY; Therapy: (Recorded:21Mar2016) to Recorded   2  KlonoPIN 0 5 MG Oral Tablet (ClonazePAM); TAKE 0 5 TABLET Twice daily; Therapy: (Catherine Nate) to Recorded   3   Lisinopril 10 MG Oral Tablet; TAKE 1 TABLET DAILY AS DIRECTED; Therapy: (Suhas Older) to Recorded   4  Paxil 20 MG Oral Tablet (PARoxetine HCl); TAKE 1 TABLET DAILY; Therapy: (Suhas Older) to Recorded   5  Zocor 20 MG Oral Tablet (Simvastatin); TAKE 1 TABLET DAILY IN THE EVENING; Therapy: (Recorded:21Mar2016) to Recorded    Future Appointments    Date/Time Provider Specialty Site   03/22/2016 10:00 AM ALCIRA Page  Psychiatry [facility]   03/23/2016 10:00 AM ALCIRA Page  Psychiatry [facility]   03/24/2016 10:00 AM ALCIRA Page  [facility] HOSPITAL[facility]   03/25/2016 10:00 AM ALCIRA Page  [facility]   04/07/2016 01:30 PM ALCIRA Paulino   Psychiatry [facility] 81     Signatures   Electronically signed by : Chuck Sidhu LCSW; Mar 21 2016 12:52PM EST                       (Author)    Electronically signed by : FABIO Aly; Mar 21 2016  2:38PM EST                       (Author)    Electronically signed by : Gaudencio Pittman RN; Mar 21 2016  3:03PM EST                       (Author)    Electronically signed by : Gaudencio Pittman RN; Mar 21 2016  3:05PM EST                       (Author)

## 2018-01-22 VITALS
HEIGHT: 60 IN | SYSTOLIC BLOOD PRESSURE: 111 MMHG | HEART RATE: 62 BPM | WEIGHT: 158 LBS | DIASTOLIC BLOOD PRESSURE: 65 MMHG | BODY MASS INDEX: 31.02 KG/M2

## 2018-01-23 VITALS — WEIGHT: 156 LBS | HEIGHT: 60 IN | BODY MASS INDEX: 30.63 KG/M2

## 2018-01-23 NOTE — PSYCH
Psych Med Mgmt    Appearance: was calm and cooperative, adequate hygiene and grooming and good eye contact  Observed mood: euthymic, but mood appropriate  Observed mood: affect was broad, but affect appropriate  Speech: a normal rate and fluent   Normal volume  Thought processes: coherent/organized   Intact associations  Hallucinations: no hallucinations present  Thought Content: no delusions  Abnormal Thoughts: The patient has no suicidal thoughts and no homicidal thoughts  Orientation: The patient is oriented to person, place and time  Recent and Remote Memory: short term memory intact and long term memory intact  Attention Span And Concentration: concentration intact  Insight: Insight intact  Judgment: Her judgment was intact  Muscle Strength And Tone  Normal gait and station  Language: no difficulty naming common objects and no difficulty repeating a phrase  Fund of knowledge: Patient displays adequate knowledge of current events, adequate fund of knowledge regarding past history and adequate fund of knowledge regarding vocabulary  The patient is experiencing no localized pain  Treatment Recommendations: Pt is doing well on present regimen  Anxiety and Depression are under control with the present regimen which I will continue without change  Treatment plan done and Pt accepts the plan  Continue:  Paroxetine 10mg (1) tab po qd # 30 R3  Clonazepam 0 5mg (1/2) tab po qd prn anxiety # 15 R3  Return 4 months, call sooner prn    Risks, Benefits And Possible Side Effects Of Medications: Risks, benefits, and possible side effects of medications explained to patient and patient verbalizes understanding  Discussed with patient the risks of sedation, respiratory depression, impairment of ability to drive and potential for abuse and addiction related to treatment with benzodiazepine medications   The patient understands risk of treatment with benzodiazepine medications, agrees to not drive if feels impaired and agrees to take medications as prescribed  The patient has been filling controlled prescriptions on time as prescribed to [facility] 26 program     She reports normal appetite, normal energy level, no weight change and normal number of sleep hours  Bran Christian is an [de-identified] y/o female here for medication review with primary statement: "I feel very good  " Pt is keeping busy--still volunteering at [employer] 1-2x weekly, but doing some more reading at home being that she is indoors more with Winter  She continues social groups and outings with friends  Pt and I had discussed potential decrease in Paroxetine and Pt halved the pill without decompensation  She still uses Clonazepam daily with benefit of anxiety moments  She has made a New Year's resolution to lose some weight  She noticed she was eating too much junk food  She is eating healthier now  Pt presently denies depression, SI, HI, panic attacks, or manic or psychotic Sxs  Personal Strengths: "Self confidence, socializing easily, discuss any issues  "  Vitals  Signs   Recorded: [id number] 09:45AM   Height: 5 ft   Weight: 156 lb   BMI Calculated: 30 47  BSA Calculated: 1 68    Assessment    1  YANCY (generalized anxiety disorder) (300 02) (F41 1)   2  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)    Plan    1  ClonazePAM 0 5 MG Oral Tablet (KlonoPIN); Take 1/2 tab po once daily for anxiety    2  PARoxetine HCl - 10 MG Oral Tablet; TAKE 1 TAB BY MOUTH DAILY    Review of Systems    Constitutional: No fever, no chills, feels well, no tiredness, no recent weight gain or loss  Cardiovascular: no complaints of slow or fast heart rate, no chest pain, no palpitations  Respiratory: no complaints of shortness of breath, no wheezing, no dyspnea on exertion  Genitourinary: no complaints of dysuria, no incontinence, no pelvic pain, no urinary frequency     Musculoskeletal: no complaints of arthralgia, no myalgias, no limb pain, no joint stiffness  Neurological: no complaints of headache, no confusion, no numbness, no dizziness  Past Psychiatric History    Past Psychiatric History: Pt was first diagnosed with a psychiatric illness (depression and anxiety) during an admission to the [place] [address] for severe Sxs 3/11/2016 - 3/17/2016  Sxs were sad mood, anhedonia, reduced appetite, insomnia, hopelessness, and passive death wish but no intent or plan of suicide  Sxs started around the holidays of 2015 and became severe within 2 months of admission when Pt moved to a new apartment  Pt also had Sxs of anxiety for at least 5-6 weeks prior to admission with worry over moving primarily  She realized her financial worries were unfounded  She moved back in with one of her sons after discharge  After discharge, Pt was enrolled in [facility] from 3/21/2016 - 4/5/2016  She continued the same discharge medicines, Paxil and Clonazepam until outpatient f/u  She was first seen for outpatient eval by Dr Riddle Oms 4/7/2016  She started seeing Mrs Williamson in 5/10/2016 for psychotherapy and it was stopped by mutual decision after 7/18/2016 visit  No other hospitalizations  She denies any h/o suicide attempts, self-mutilating/other self harm behaviors, violent behavior, ECT, milana legal or  Hx  Substance Abuse Hx    Substance Abuse History: Pt denies any h/o ETOH or illicit drug use or abuse  Active Problems    1  YANCY (generalized anxiety disorder) (300 02) (F41 1)   2  Hyperlipidemia (272 4) (E78 5)   3  Hypertension (401 9) (I10)   4  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)    Past Medical History    1  Hypertension (401 9) (I10)    The active problems and past medical history were reviewed and updated today  Allergies    1  Codeine Derivatives   2  Demerol TABS   3  Sulfa Drugs   4  TraMADol HCl TABS    Current Meds   1   ClonazePAM 0 5 MG Oral Tablet; Take 1/2 tab po once daily for anxiety; Last   Rx:13Sep2017 Ordered   2  HydroCHLOROthiazide 12 5 MG Oral Capsule; TAKE 1 CAPSULE DAILY; Therapy: (Ramona Mora) to Recorded   3  Lisinopril 10 MG Oral Tablet; TAKE 1 TABLET DAILY AS DIRECTED; Therapy: (Ramona Mora) to Recorded   4  PARoxetine HCl - 20 MG Oral Tablet; TAKE 1/2 TABLET DAILY  No new Rx  Pt was only instructed to reduce the dose  Requested for: 06Oct2017; Last   Rx:06Oct2017 Ordered   5  Zocor 20 MG Oral Tablet; TAKE 1 TABLET DAILY IN THE EVENING; Therapy: (Recorded:21Mar2016) to Recorded    The medication list was reviewed and updated today  Family Psych History  Mother    1  Family history of depression (V17 0) (Z81 8)    The family history was reviewed and updated today  Social History    · Confucianism activities   · [employer] graduate   ·    · Home   · Loss or death of child (V61 07) (Z63 4)   · Never a smoker   · No history of alcohol use   · Number of children   · Recreational activities   · Retired   · Volunteer work  The social history was reviewed and updated today  The social history was reviewed and is unchanged  No change as of 1/15/2018      History Of Phys/Sex Abuse Or Perpetration    History Of Phys/Sex Abuse or Perpetration: Pt denies any h/o childhood physical or sexual abuse  Education     Pt denies any h/o learning disabilities and reached childhood milestones on time as far as she knows  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[address]    1  ClonazePAM 0 5 MG Oral Tablet (KlonoPIN); Take 1/2 tab po once daily for anxiety; Last   Rx:15Jan2018 Ordered    2  Zocor 20 MG Oral Tablet (Simvastatin); TAKE 1 TABLET DAILY IN THE EVENING; Therapy: (Ramona Mora) to Recorded    3  HydroCHLOROthiazide 12 5 MG Oral Capsule; TAKE 1 CAPSULE DAILY;    Therapy: (Recorded:21Mar2016) to Recorded 4  Lisinopril 10 MG Oral Tablet; TAKE 1 TABLET DAILY AS DIRECTED; Therapy: (Juany Barker) to Recorded    5  PARoxetine HCl - 10 MG Oral Tablet; TAKE 1 TAB BY MOUTH DAILY  Requested for:   [id number];  Last Rx:15Jan2018 Ordered    Signatures   Electronically signed by : SIMEON Germain; Og 15 2018 10:01AM EST                       (Author)    Electronically signed by : ALCIRA Perez ; Jan 16 2018 10:35AM EST                       (Author)

## 2018-03-07 NOTE — PSYCH
History of Present Illness    Presenting Problems: Stressors: SUICIDAL IDEATION &ANXIOUS FEELING HOPELESSNESS  Referral Source: [address] [employer]  She is not employed  She is not a smoker  Symptoms: suicidal ideation, no self abusive behaviors, no homicidal thoughts, no history of violence toward others, depressed mood, anxiety, sleep disturbances, change in appetite and POOR CONCENTRATION  Provisional Diagnosis: Axis I: MDD SINGLE EPISODE W/O PSYCHOSIS and Axis II: YANCY  Substance Abuse: No substance abuse  Psychiatric Treatment History: NONE, Current Psychiatrist: NONE and Therapist: NONE   The patient does not require ambulatory assistance  Legal Issues: The patient does not have legal issues  ACCEPTED  Appointment Date: 03/21/16        Signatures   Electronically signed by : Cynthia Jarrett, ; Mar 16 2016  2:35PM EST                       (Author)

## 2018-05-15 ENCOUNTER — OFFICE VISIT (OUTPATIENT)
Dept: PSYCHIATRY | Facility: CLINIC | Age: 83
End: 2018-05-15
Payer: COMMERCIAL

## 2018-05-15 VITALS — WEIGHT: 156.2 LBS | BODY MASS INDEX: 30.67 KG/M2 | HEIGHT: 60 IN

## 2018-05-15 DIAGNOSIS — F32.5 MAJOR DEPRESSIVE DISORDER WITH SINGLE EPISODE, IN FULL REMISSION (HCC): ICD-10-CM

## 2018-05-15 DIAGNOSIS — F41.1 GENERALIZED ANXIETY DISORDER: Primary | ICD-10-CM

## 2018-05-15 PROCEDURE — 99213 OFFICE O/P EST LOW 20 MIN: CPT | Performed by: PHYSICIAN ASSISTANT

## 2018-05-15 RX ORDER — CLONAZEPAM 0.5 MG/1
TABLET ORAL
COMMUNITY
End: 2018-05-15 | Stop reason: SDUPTHER

## 2018-05-15 RX ORDER — CLONAZEPAM 0.5 MG/1
0.25 TABLET ORAL DAILY PRN
Qty: 30 TABLET | Refills: 3 | Status: SHIPPED | OUTPATIENT
Start: 2018-05-15 | End: 2018-09-14 | Stop reason: SDUPTHER

## 2018-05-15 RX ORDER — PAROXETINE 10 MG/1
10 TABLET, FILM COATED ORAL DAILY
Qty: 30 TABLET | Refills: 3 | Status: SHIPPED | OUTPATIENT
Start: 2018-05-15 | End: 2018-09-14 | Stop reason: SDUPTHER

## 2018-05-15 RX ORDER — PAROXETINE 10 MG/1
1 TABLET, FILM COATED ORAL DAILY
COMMUNITY
End: 2018-05-15 | Stop reason: SDUPTHER

## 2018-05-15 NOTE — PSYCH
MEDICATION MANAGEMENT NOTE        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  She feels otherwise fine, and does not believe her recent stress to be related to the reduction in Paroxetine last visit  She does not want any changes to her current regimen and denies any SE from her medicines  She enjoys time with grandchildren and great-grandchildren  Pt presently denies depression, SI, HI, panic attacks, or manic or psychotic Sxs  Appetite Changes and Sleep: normal sleep, normal appetite, normal energy level    Review Of Systems:      Constitutional negative   ENT negative   Cardiovascular negative   Respiratory negative   Gastrointestinal negative   Genitourinary negative   Musculoskeletal negative   Integumentary negative   Neurological negative   Endocrine negative   Other Symptoms none       Past Psychiatric History:   Pt was first diagnosed with a psychiatric illness (depression and anxiety) during an admission to the [place] [address] for severe Sxs 3/11/2016 - 3/17/2016  Sxs were sad mood, anhedonia, reduced appetite, insomnia, hopelessness, and passive death wish but no intent or plan of suicide  Sxs started around the holidays of 2015 and became severe within 2 months of admission when Pt moved to a new apartment  Pt also had Sxs of anxiety for at least 5-6 weeks prior to admission with worry over moving primarily  She realized her financial worries were unfounded    She moved back in with one of her sons after discharge      After discharge, Pt was enrolled in the [facility] from 3/21/2016 - 2016  She continued the same discharge medicines, Paxil and Clonazepam until outpatient f/u  She was first seen for outpatient eval by Dr Wilson Needle 2016  She started seeing Mrs Williamson in 5/10/2016 for psychotherapy and it was stopped by mutual decision after 2016 visit      No other hospitalizations   She denies any h/o suicide attempts, self-mutilating/other self harm behaviors, violent behavior, ECT, milana legal or  Hx    Traumatic History:     Abuse: none  Other Traumatic Events: none       Past Medical History:    Past Medical History:   Diagnosis Date    Anxiety     Depression     HTN (hypertension)     Hyperlipidemia        Substance Abuse History:    History   Alcohol Use No     History   Drug Use No       Social History:    Social History     Social History    Marital status:      Spouse name: N/A    Number of children: 3    Years of education: N/A     Occupational History    retired nurse      Social History Main Topics    Smoking status: Never Smoker    Smokeless tobacco: Never Used    Alcohol use No    Drug use: No    Sexual activity: Not Currently     Other Topics Concern    Not on file     Social History Narrative    Home: Lives with her son and his family    Pt  first --he  after the fact    Has 3 Children--sons born [id number], 80 (this son  in 18), 65        Education:     Pt denies any h/o learning disabilities and reached childhood milestones on time as far as she knows    Graduated high school [employer]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    RN degree from [employer]        Family Psychiatric History:     Family History   Problem Relation Age of Onset    Depression Mother     Hypertension Mother    Chica Roles Breast cancer Mother     Diabetes Mother     Lymphoma Father History Review:  The following portions of the patient's history were reviewed and updated as appropriate: allergies, current medications, past family history, past medical history, past social history, past surgical history and problem list          OBJECTIVE:     Mental Status Evaluation:    Appearance casually dressed, good eye contact and hygiene   Behavior pleasant, cooperative, calm   Speech normal rate and volume   Mood anxious   Affect normal range and intensity   Thought Processes organized, goal directed   Associations intact associations   Thought Content No delusions   Perceptual Disturbances: no auditory hallucinations, no visual hallucinations, does not appear responding to internal stimuli   Abnormal Thoughts  Risk Potential Suicidal ideation - None  Homicidal ideation - None  Potential for aggression - No   Orientation oriented to person, place, time/date, situation, day of week, month of year and year   Memory short term memory grossly intact   Cosciousness alert and awake   Attention Span attention span and concentration are age appropriate   Intellect not formally assessed   Insight good   Judgement good   Muscle Strength and  Gait normal gait , normal balance   Language no difficulty naming common objects, no difficulty repeating a phrase, no difficulty writing a sentence   Fund of Knowledge adequate knowledge of current events  adequate fund of knowledge regarding past history  adequate fund of knowledge regarding vocabulary    Pain none   Pain Scale 0       Laboratory Results:   Most Recent Labs:   Lab Results   Component Value Date    WBC 4 87 03/11/2016    RBC 4 33 03/11/2016    HGB 12 7 03/11/2016    HCT 37 8 03/11/2016     03/11/2016    RDW 13 6 03/11/2016     03/11/2016    K 3 9 03/11/2016     03/11/2016    CO2 26 03/11/2016    BUN 17 03/11/2016    CREATININE 0 75 03/11/2016    GLUCOSE 99 03/11/2016    CALCIUM 9 0 03/11/2016    CHOL 154 03/11/2016    HDL 75 (H) 03/11/2016    TRIG 61 03/11/2016    LDLCALC 67 03/11/2016    RWR3FNEKIYVJ 1 250 03/11/2016    RPR Non-Reactive 03/11/2016       Assessment/plan:       Diagnoses and all orders for this visit:    Generalized anxiety disorder  -     PARoxetine (PAXIL) 10 mg tablet; Take 1 tablet (10 mg total) by mouth daily for 30 days  -     clonazePAM (KlonoPIN) 0 5 mg tablet; Take 0 5 tablets (0 25 mg total) by mouth daily as needed for anxiety for up to 30 days    Major depressive disorder with single episode, in full remission (HCC)  -     PARoxetine (PAXIL) 10 mg tablet; Take 1 tablet (10 mg total) by mouth daily for 30 days    Other orders  -     Discontinue: clonazePAM (KlonoPIN) 0 5 mg tablet; Take by mouth  -     Discontinue: PARoxetine (PAXIL) 10 mg tablet; Take 1 tablet by mouth daily          PLAN:  Pt has some moderate level anxiety which was situational but mood is stable and Pt does not want any changes to her current regimen  She is not interested in psychotherapy  Treatment plan done and Pt accepts the plan  Paroxetine 10mg (1) tab po qd # 30 R3  Clonazepam 0 5mg (1/2) tab po qd prn anxiety # 15 R3  Return 4 months, call sooner prn          Risks/Benefits      Risks, Benefits And Possible Side Effects Of Medications:    Risks, benefits, and possible side effects of medications explained to Christopher and she verbalizes understanding and agreement for treatment  Controlled Medication Discussion:     Christopher has been filling controlled prescriptions on time as prescribed according to [facility]    Discussed with Christopher the risks of sedation, respiratory depression, impairment of ability to drive and potential for abuse and addiction related to treatment with benzodiazepine medications  She understands risk of treatment with benzodiazepine medications, agrees to not drive if feels impaired and agrees to take medications as prescribed

## 2018-09-14 ENCOUNTER — OFFICE VISIT (OUTPATIENT)
Dept: PSYCHIATRY | Facility: CLINIC | Age: 83
End: 2018-09-14
Payer: COMMERCIAL

## 2018-09-14 VITALS
HEART RATE: 73 BPM | BODY MASS INDEX: 30.51 KG/M2 | WEIGHT: 155.4 LBS | DIASTOLIC BLOOD PRESSURE: 76 MMHG | HEIGHT: 60 IN | SYSTOLIC BLOOD PRESSURE: 132 MMHG

## 2018-09-14 DIAGNOSIS — F41.1 GENERALIZED ANXIETY DISORDER: ICD-10-CM

## 2018-09-14 DIAGNOSIS — F32.5 MAJOR DEPRESSIVE DISORDER WITH SINGLE EPISODE, IN FULL REMISSION (HCC): ICD-10-CM

## 2018-09-14 PROCEDURE — 99213 OFFICE O/P EST LOW 20 MIN: CPT | Performed by: PHYSICIAN ASSISTANT

## 2018-09-14 RX ORDER — PAROXETINE 10 MG/1
10 TABLET, FILM COATED ORAL DAILY
Qty: 30 TABLET | Refills: 3 | Status: SHIPPED | OUTPATIENT
Start: 2018-09-14 | End: 2019-01-04 | Stop reason: ALTCHOICE

## 2018-09-14 RX ORDER — CLONAZEPAM 0.5 MG/1
0.25 TABLET ORAL DAILY PRN
Qty: 30 TABLET | Refills: 3 | Status: SHIPPED | OUTPATIENT
Start: 2018-09-14 | End: 2019-03-12 | Stop reason: SDUPTHER

## 2018-09-14 NOTE — PSYCH
MEDICATION MANAGEMENT NOTE        [address]      Name and Date of Birth:  Renita Llanos 82 y o  1935    Date of Visit: September 14, 2018    HPI:    Alondra Guerrero is here for medication review with primary c/o / Area of need:  "I wake up early, I always did" but "Not groggy" and she states her body is used to awakening early from when she took walks at 5:30AM    She also states she would like to "Exercise more and adhere to a better diet " She admits to having many junk snacks at night, but is starting to eat healthier snacks  She is thinking about going to a gym  Pt lives with one son and her other son moved to [place] for senior care  She misses him but is happy for him  She continues volunteering at [facility], attending [employer] and goes out with friends  Her Card Club restarted for Fall--plays cards with friends until next Spring  Pt otherwise presently denies any depression, SI, HI, anxiety, panic attacks, or manic or psychotic Sxs  She reports compliance to her medications without SE  Appetite Changes and Sleep: adequate number of sleep hours, normal appetite, normal energy level    Review Of Systems:      Constitutional negative   ENT negative   Cardiovascular negative   Respiratory negative   Gastrointestinal negative   Genitourinary negative   Musculoskeletal negative   Integumentary negative   Neurological negative   Endocrine negative   Other Symptoms none       Past Psychiatric History:   As copied from my 5/15/2018 with updates as needed:  " [ Pt was first diagnosed with a psychiatric illness (depression and anxiety) during an admission to the [address] [place] [address] for severe Sxs 3/11/2016 - 3/17/2016  Sxs were sad mood, anhedonia, reduced appetite, insomnia, hopelessness, and passive death wish but no intent or plan of suicide   Sxs started around the holidays of 2015 and became severe within 2 months of admission when Pt moved to a new apartment  Pt also had Sxs of anxiety for at least 5-6 weeks prior to admission with worry over moving primarily  She realized her financial worries were unfounded  She moved back in with one of her sons after discharge      After discharge, Pt was enrolled in the [facility] from 3/21/2016 - 2016  She continued the same discharge medicines, Paxil and Clonazepam until outpatient f/u  She was first seen for outpatient eval by Dr Barbie Humphries 2016  She started seeing Mrs Williamson in 5/10/2016 for psychotherapy and it was stopped by mutual decision after 2016 visit      No other hospitalizations   She denies any h/o suicide attempts, self-mutilating/other self harm behaviors, violent behavior, ECT, milana legal or  Hx     Traumatic History:      Abuse: none  Other Traumatic Events: none                                                                 ] "      Past Medical History:    Past Medical History:   Diagnosis Date    Anxiety     Depression     HTN (hypertension)     Hyperlipidemia        Substance Abuse History:    History   Alcohol Use No     History   Drug Use No       Social History:    Social History     Social History    Marital status:      Spouse name: N/A    Number of children: 3    Years of education: N/A     Occupational History    retired nurse      Social History Main Topics    Smoking status: Never Smoker    Smokeless tobacco: Never Used    Alcohol use No    Drug use: No    Sexual activity: Not Currently     Other Topics Concern    Not on file     Social History Narrative    Home: Lives with her son and his family    Pt  first --he  after the fact    Has 3 Children--sons born [id number], 80 (this son  in 18), 65        Education:     Pt denies any h/o learning disabilities and reached childhood milestones on time as far as she knows    Graduated high school [employer]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 RN degree from [employer]       Family Psychiatric History:     Family History   Problem Relation Age of Onset    Depression Mother     Hypertension Mother    Rashid Alejo Breast cancer Mother     Diabetes Mother     Lymphoma Father        History Review: The following portions of the patient's history were reviewed and updated as appropriate: allergies, current medications, past family history, past medical history, past social history, past surgical history and problem list          OBJECTIVE:     Mental Status Evaluation:    Appearance Casually dressed, good eye contact and hygiene   Behavior Calm, cooperative, pleasant   Speech Clear, normal rate and volume   Mood Euthymic   Affect Normal range and intensity   Thought Processes Organized, goal directed   Associations intact associations   Thought Content No delusions   Perceptual Disturbances: Pt denies any form of hallucinations and does not appear to be responding to internal stimuli   Abnormal Thoughts  Risk Potential Suicidal ideation - None  Homicidal ideation - None  Potential for aggression - No   Orientation oriented to person, place, time/date, situation and day of week   Memory short term memory grossly intact   Cosciousness alert and awake   Attention Span attention span and concentration are age appropriate   Intellect not formally assessed   Insight good   Judgement good   Muscle Strength and  Gait normal gait , normal balance   Language no difficulty naming common objects, no difficulty repeating a phrase, no difficulty writing a sentence   Fund of Knowledge adequate knowledge of current events  adequate fund of knowledge regarding past history  adequate fund of knowledge regarding vocabulary    Pain none   Pain Scale 0       Laboratory Results: No recent labwork    Assessment/plan:       Diagnoses and all orders for this visit:    Generalized anxiety disorder  -     PARoxetine (PAXIL) 10 mg tablet;  Take 1 tablet (10 mg total) by mouth daily for 30 days  -     clonazePAM (KlonoPIN) 0 5 mg tablet; Take 0 5 tablets (0 25 mg total) by mouth daily as needed for anxiety for up to 30 days    Major depressive disorder with single episode, in full remission (HCC)  -     PARoxetine (PAXIL) 10 mg tablet; Take 1 tablet (10 mg total) by mouth daily for 30 days        PLAN:  Pt appears stable on present regimen which I will continue  She does not want a medication for early awakenings--this has been her norm for years and she feels energetic  Treatment plan done and Pt accepts the plan  Continue:  Paroxetine 10mg (1) tab po qd # 30 R3  Clonazepam 0 5mg (1/2) tab po qd prn anxiety # 15 R3  Return 4 months, call sooner prn    Risks/Benefits      Risks, Benefits And Possible Side Effects Of Medications:    Risks, benefits, and possible side effects of medications explained to Christopher and she verbalizes understanding and agreement for treatment

## 2019-01-04 ENCOUNTER — OFFICE VISIT (OUTPATIENT)
Dept: PSYCHIATRY | Facility: CLINIC | Age: 84
End: 2019-01-04
Payer: COMMERCIAL

## 2019-01-04 VITALS
HEART RATE: 69 BPM | WEIGHT: 152.9 LBS | DIASTOLIC BLOOD PRESSURE: 73 MMHG | HEIGHT: 60 IN | BODY MASS INDEX: 30.02 KG/M2 | SYSTOLIC BLOOD PRESSURE: 130 MMHG

## 2019-01-04 DIAGNOSIS — F41.1 GENERALIZED ANXIETY DISORDER: Primary | ICD-10-CM

## 2019-01-04 DIAGNOSIS — J06.9 ACUTE URI: ICD-10-CM

## 2019-01-04 DIAGNOSIS — F32.5 MAJOR DEPRESSIVE DISORDER WITH SINGLE EPISODE, IN FULL REMISSION (HCC): ICD-10-CM

## 2019-01-04 DIAGNOSIS — IMO0002 SQUAMOUS CELL CARCINOMA: ICD-10-CM

## 2019-01-04 PROCEDURE — 99213 OFFICE O/P EST LOW 20 MIN: CPT | Performed by: PHYSICIAN ASSISTANT

## 2019-01-04 NOTE — PSYCH
MEDICATION MANAGEMENT NOTE        [facility]      Name and Date of Birth:  Angel Marti 82 y o  1935    Date of Visit: January 4, 2019    HPI:    Jere Morris is here for medication review with primary statement, "I'm fine "  Her anxiety is nil and she has not taken Clonazepam and  takes 1/4-1/2 tab qd prn  However, she has not even needed the BZD for approx the past 2 months  She presently denies any depression, SI, HI, panic attacks, or manic or psychotic Sxs  Pt continues all of her volunteer and social activities with friends, [facility], the [employer] and [employer] activities  She greatly enjoyed her holidays with family  Pt feels ready for a trial off of medication  She has a squamous cell carcinoma on her Lt upper thigh which she will have removed 2/1/2019  Appetite Changes and Sleep: disrupted sleep, due to a "Cold", normal appetite, normal energy level    Review Of Systems:      Constitutional feeling tired   ENT nasal congestion   Cardiovascular negative   Respiratory negative   Gastrointestinal negative   Genitourinary negative   Musculoskeletal negative   Integumentary as noted in HPI   Neurological negative   Endocrine negative   Other Symptoms none       Past Psychiatric History:   As copied from my 9/14/2018 note with updates as needed:  " [ Pt was first diagnosed with a psychiatric illness (depression and anxiety) during an admission to the [place] [address] for severe Sxs 3/11/2016 - 3/17/2016  Sxs were sad mood, anhedonia, reduced appetite, insomnia, hopelessness, and passive death wish but no intent or plan of suicide  Sxs started around the holidays of 2015 and became severe within 2 months of admission when Pt moved to a new apartment  Pt also had Sxs of anxiety for at least 5-6 weeks prior to admission with worry over moving primarily  She realized her financial worries were unfounded    She moved back in with one of her sons after discharge      After discharge, Pt was enrolled in the [facility] from 3/21/2016 - 2016  She continued the same discharge medicines, Paxil and Clonazepam until outpatient f/u  She was first seen for outpatient eval by Dr Grace Dorantes 2016  She started seeing Mrs Williamson in 5/10/2016 for psychotherapy and it was stopped by mutual decision after 2016 visit      No other hospitalizations   She denies any h/o suicide attempts, self-mutilating/other self harm behaviors, violent behavior, ECT, milana legal or  Hx     Traumatic History:      Abuse: none  Other Traumatic Events: none                                                                 ] "      Past Medical History:    Past Medical History:   Diagnosis Date    Anxiety     Depression     HTN (hypertension)     Hyperlipidemia        Substance Abuse History:    History   Alcohol Use No     History   Drug Use No       Social History:    Social History     Social History    Marital status:      Spouse name: N/A    Number of children: 3    Years of education: N/A     Occupational History    retired nurse      Social History Main Topics    Smoking status: Never Smoker    Smokeless tobacco: Never Used    Alcohol use No    Drug use: No    Sexual activity: Not Currently     Other Topics Concern    Not on file     Social History Narrative    Home: Lives with her son and his family    Pt  first --he  after the fact    Has 3 Children--sons born [id number], 80 (this son  in 18), 65        Education:     Pt denies any h/o learning disabilities and reached childhood milestones on time as far as she knows    Graduated high school [employer]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    RN degree from [employer]       Family Psychiatric History:     Family History   Problem Relation Age of Onset    Depression Mother     Hypertension Mother    Vinny Shrestha Breast cancer Mother  Diabetes Mother     Lymphoma Father        History Review: The following portions of the patient's history were reviewed and updated as appropriate: allergies, current medications, past family history, past medical history, past social history, past surgical history and problem list          OBJECTIVE:     Mental Status Evaluation:    Appearance Casually dressed, good eye contact and hygiene   Behavior Calm, cooperative, pleasant   Speech Clear, normal rate and volume   Mood Euthymic   Affect Normal range and intensity   Thought Processes Organized, goal directed   Associations intact associations   Thought Content No delusions   Perceptual Disturbances: Pt denies any form of hallucinations and does not appear to be responding to internal stimuli   Abnormal Thoughts  Risk Potential Suicidal ideation - None  Homicidal ideation - None  Potential for aggression - No   Orientation oriented to person, place, time/date, situation, day of week, month of year and year   Memory short term memory grossly intact   Cosciousness alert and awake   Attention Span attention span and concentration are age appropriate   Intellect appears to be of average intelligence and not formally assessed   Insight good   Judgement good   Muscle Strength and  Gait normal gait and normal balance   Language no difficulty naming common objects, no difficulty repeating a phrase, no difficulty writing a sentence   Fund of Knowledge adequate knowledge of current events  adequate fund of knowledge regarding past history  adequate fund of knowledge regarding vocabulary    Pain none   Pain Scale 0       Laboratory Results: No new labwork since last visit    Assessment/plan:       Diagnoses and all orders for this visit:    Generalized anxiety disorder    Major depressive disorder with single episode, in full remission ([facility] [address] )    Acute URI    Squamous cell carcinoma          PLAN:  Pt has been stable and ready for a trial off of the SSRI   Pt has a goal to wean of all medications in the future  I will continue the Clonazepam for now for potential breakthrough anxiety during the transition  Pt to call me before next visit if she starts to decompensate  Treatment plan done and Pt accepts the plan and verbalizes understanding of all instructions  D/C Paroxetine  Clonazepam 0 5mg (1/4-1/2) tab po qd prn anxiety--Pt not needing more at present  Encouraged good nutrition and fluid intake for URI  F/U Dermatology for skin CA   Return 8 weeks, call sooner prn    Risks/Benefits      Risks, Benefits And Possible Side Effects Of Medications:    Risks, benefits, and possible side effects of medications explained to Christopher and she verbalizes understanding and agreement for treatment

## 2019-01-07 ENCOUNTER — TELEPHONE (OUTPATIENT)
Dept: BEHAVIORAL/MENTAL HEALTH CLINIC | Facility: CLINIC | Age: 84
End: 2019-01-07

## 2019-01-07 DIAGNOSIS — F41.1 GENERALIZED ANXIETY DISORDER: Primary | ICD-10-CM

## 2019-01-07 RX ORDER — PAROXETINE 10 MG/1
5 TABLET, FILM COATED ORAL DAILY
Qty: 1 TABLET | Refills: 0
Start: 2019-01-07 | End: 2019-08-06 | Stop reason: ALTCHOICE

## 2019-01-07 NOTE — TELEPHONE ENCOUNTER
Jerod Valdes went off the paxil like you told her  She has been off it for 3 days and she is sick to her stomach, dizzy, did vomit once, headache, jittery and does not feel herself  Please call her back as soon as you can

## 2019-01-07 NOTE — TELEPHONE ENCOUNTER
I contacted Dianne Sanchez on cell/home phone # of record and she reported having developed the Sxs noted in  Mandi's msg, today  She still has Paroxetine refills from a prior Rx and I advised her to fill another Rx but only take 1/2 of 1 tab po qd and if she still feels some withdrawal Sxs, to go back up to 10mg qd and call me  Pt sounded calm and not distressed, verbalized understanding of my instructions and accepted the plan

## 2019-03-11 NOTE — PSYCH
MEDICATION MANAGEMENT NOTE        [address] [place] [address]      Name and Date of Birth:  Bhavna Sosa 82 y o  1935    Date of Visit: March 12, 2019    HPI:    Robert Agent is here for medication review with primary c/o "    She had to restart the Paxil at 5mg qd due to withdrawal effects (felt unlike herself, sick to her stomach, with HA, vomiting x 1, and jitters--(Per the 1/7/2019 notations)  She is s/p ER visit 2/27/2019 and admitted overnight for viral gastroenteritis with reduced responsiveness per the family  She was observed on the med/surg unit with telemetry and treated with IVF, antiemetics  After recovery, she was discharged with referral for cardiology f/u due to a 2D echocardiogram which showed mod to severe AS with EF 65%  Prior to her illness, she spent a fun vacation visit with her son in [place]  She continues volunteerism at [facility] as well as her social activities  Pt presently denies depression, SI, HI, anxiety, panic attacks, or manic or psychotic Sxs  She reports compliance to her psychiatric medications without SE and no further withdrawal effects from reduction of Paroxetine  She takes the Clonazepam sparingly  She mentions having had the lesion from Lt upper thigh removed in early 2/2019 and it was confirmed as a squamous cell carcinoma  There were no complications per Pt  She has no GI Sxs other than a suboptimal appetite but it is improving       Appetite Changes and Sleep: normal sleep, less than normal, but adequate and improving, normal energy level    Review Of Systems:      Constitutional negative   ENT negative   Cardiovascular negative   Respiratory negative   Gastrointestinal as noted in HPI regarding appetite   Genitourinary negative   Musculoskeletal negative   Integumentary negative   Neurological negative   Endocrine negative   Other Symptoms none       Past Psychiatric History:   As copied from my 1/4/2019 note with updates as needed:  " [ Pt was first diagnosed with a psychiatric illness (depression and anxiety) during an admission to the [place] [id number] for severe Sxs 3/11/2016 - 3/17/2016  Sxs were sad mood, anhedonia, reduced appetite, insomnia, hopelessness, and passive death wish but no intent or plan of suicide  Sxs started around the holidays of 2015 and became severe within 2 months of admission when Pt moved to a new apartment  Pt also had Sxs of anxiety for at least 5-6 weeks prior to admission with worry over moving primarily  She realized her financial worries were unfounded  She moved back in with one of her sons after discharge      After discharge, Pt was enrolled in the [facility] from 3/21/2016 - 4/5/2016  She continued the same discharge medicines, Paxil and Clonazepam until outpatient f/u  She was first seen for outpatient eval by Dr Yann Hope 4/7/2016  She started seeing Mrs Williamson in 5/10/2016 for psychotherapy and it was stopped by mutual decision after 7/18/2016 visit      No other hospitalizations   She denies any h/o suicide attempts, self-mutilating/other self harm behaviors, violent behavior, ECT, milana legal or  Hx     Traumatic History:      Abuse: none  Other Traumatic Events: none                                                                 ] "        Past Medical History:    Past Medical History:   Diagnosis Date    Anxiety     Depression     HTN (hypertension)     Hyperlipidemia        Substance Abuse History:    Social History     Substance and Sexual Activity   Alcohol Use No     Social History     Substance and Sexual Activity   Drug Use No       Social History:    Social History     Socioeconomic History    Marital status:      Spouse name: Not on file    Number of children: 3    Years of education: Not on file    Highest education level: Not on file   Occupational History    Occupation: retired nurse   Social Needs    Financial resource strain: Not hard at all    Food insecurity:     Worry: Never true     Inability: Never true    Transportation needs:     Medical: No     Non-medical: No   Tobacco Use    Smoking status: Never Smoker    Smokeless tobacco: Never Used   Substance and Sexual Activity    Alcohol use: No    Drug use: No    Sexual activity: Not Currently   Lifestyle    Physical activity:     Days per week: 0 days     Minutes per session: Not on file    Stress: Not on file   Relationships    Social connections:     Talks on phone: Not on file     Gets together: Not on file     Attends Alevism service: Not on file     Active member of club or organization: Not on file     Attends meetings of clubs or organizations: Not on file     Relationship status: Not on file    Intimate partner violence:     Fear of current or ex partner: Not on file     Emotionally abused: Not on file     Physically abused: Not on file     Forced sexual activity: Not on file   Other Topics Concern    Not on file   Social History Narrative    Home: Lives with one of her sons Dede Beckett and his family    Pt  first --he  after the fact    Has 4 Children--sons born [id number], 80 (this son  in 18), 65        Education:     Pt denies any h/o learning disabilities and reached childhood milestones on time as far as she knows    Graduated high school [employer]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    RN degree from [employer]       Family Psychiatric History:     Family History   Problem Relation Age of Onset    Depression Mother     Hypertension Mother     Breast cancer Mother     Diabetes Mother     Lymphoma Father        History Review:  The following portions of the patient's history were reviewed and updated as appropriate: allergies, current medications, past family history, past medical history, past social history, past surgical history and problem list          OBJECTIVE:     Mental Status Evaluation:    Appearance Casually dressed, good eye contact and hygiene   Behavior Calm, cooperative, pleasant, fidgeting a bit with hands   Speech Clear, normal rate and volume   Mood appears mildly anxious   Affect Normal range and intensity   Thought Processes Organized, goal directed   Associations intact associations   Thought Content No delusions   Perceptual Disturbances: Pt denies any form of hallucinations and does not appear to be responding to internal stimuli   Abnormal Thoughts  Risk Potential Suicidal ideation - None  Homicidal ideation - None  Potential for aggression - No   Orientation oriented to person, place, situation, day of week, date, month of year and year   Memory short term memory grossly intact   Cosciousness alert and awake   Attention Span attention span and concentration are age appropriate   Intellect appears to be of average intelligence but not formally assessed   Insight good   Judgement good   Muscle Strength and  Gait normal gait and normal balance   Language no difficulty naming common objects, no difficulty repeating a phrase   Fund of Knowledge adequate knowledge of current events  adequate fund of knowledge regarding past history  adequate fund of knowledge regarding vocabulary    Pain none   Pain Scale 0       Laboratory/Test Results: I have personally reviewed all pertinent laboratory/tests results  From 2/27/2019 - 2/28/2019 tests  Pt also brought 2/15/2019 labwork results from [facility], CMP, CBC, TSH, HgbA1c, WNL  However, the Vit D level was low at 21ng/mL      Assessment/plan:       Diagnoses and all orders for this visit:    Generalized anxiety disorder  -     clonazePAM (KlonoPIN) 0 5 mg tablet;  Take 0 5 tablets (0 25 mg total) by mouth daily as needed for anxiety    Major depression in remission ([facility] [address] )    Vitamin D deficiency    Squamous cell carcinoma          PLAN:  Pt appears stable and ready to completely D/C Paroxetine from the 5mg dose level which she has been on for 2 months  YANCY-7 score zero and PHQ 9 score zero  Tx plan due next visit  Trial off Paroxetine   Continue:  Clonazepam 0 5mg (1/4-1/2) tab po qd prn anxiety # 15 R1  Vit D --OTC preparation  F/U PMD as scheduled   F/U Dermatology and PMD as scheduled  Return 7 weeks, call sooner prn    Risks/Benefits      Risks, Benefits And Possible Side Effects Of Medications:    Risks, benefits, and possible side effects of medications explained to Sandra Walsh and she verbalizes understanding and agreement for treatment  Controlled Medication Discussion:     Sandra Walsh has been filling controlled prescriptions on time as prescribed according to Patricia Joy [address]    Discussed with Sandra Walsh the risks of sedation, respiratory depression, impairment of ability to drive and potential for abuse and addiction related to treatment with benzodiazepine medications  She understands risk of treatment with benzodiazepine medications, agrees to not drive if feels impaired and agrees to take medications as prescribed

## 2019-03-12 ENCOUNTER — OFFICE VISIT (OUTPATIENT)
Dept: PSYCHIATRY | Facility: CLINIC | Age: 84
End: 2019-03-12
Payer: COMMERCIAL

## 2019-03-12 VITALS — HEIGHT: 60 IN | BODY MASS INDEX: 29.29 KG/M2 | WEIGHT: 149.2 LBS

## 2019-03-12 DIAGNOSIS — E55.9 VITAMIN D DEFICIENCY: ICD-10-CM

## 2019-03-12 DIAGNOSIS — F41.1 GENERALIZED ANXIETY DISORDER: Primary | ICD-10-CM

## 2019-03-12 DIAGNOSIS — IMO0002 SQUAMOUS CELL CARCINOMA: ICD-10-CM

## 2019-03-12 DIAGNOSIS — F32.5 MAJOR DEPRESSION IN REMISSION (HCC): ICD-10-CM

## 2019-03-12 PROCEDURE — 99213 OFFICE O/P EST LOW 20 MIN: CPT | Performed by: PHYSICIAN ASSISTANT

## 2019-03-12 RX ORDER — CLONAZEPAM 0.5 MG/1
0.25 TABLET ORAL DAILY PRN
Qty: 15 TABLET | Refills: 1 | Status: SHIPPED | OUTPATIENT
Start: 2019-03-12

## 2019-05-02 ENCOUNTER — OFFICE VISIT (OUTPATIENT)
Dept: PSYCHIATRY | Facility: CLINIC | Age: 84
End: 2019-05-02
Payer: COMMERCIAL

## 2019-05-02 VITALS — WEIGHT: 147 LBS | HEIGHT: 60 IN | BODY MASS INDEX: 28.86 KG/M2

## 2019-05-02 DIAGNOSIS — F41.1 GENERALIZED ANXIETY DISORDER: Primary | ICD-10-CM

## 2019-05-02 DIAGNOSIS — F32.5 MAJOR DEPRESSION IN REMISSION (HCC): ICD-10-CM

## 2019-05-02 DIAGNOSIS — E55.9 VITAMIN D DEFICIENCY: ICD-10-CM

## 2019-05-02 PROCEDURE — 99213 OFFICE O/P EST LOW 20 MIN: CPT | Performed by: PHYSICIAN ASSISTANT

## 2019-06-25 ENCOUNTER — DOCUMENTATION (OUTPATIENT)
Dept: PSYCHIATRY | Facility: CLINIC | Age: 84
End: 2019-06-25

## 2019-07-16 ENCOUNTER — ANNUAL EXAM (OUTPATIENT)
Dept: OBGYN CLINIC | Facility: CLINIC | Age: 84
End: 2019-07-16
Payer: COMMERCIAL

## 2019-07-16 VITALS
DIASTOLIC BLOOD PRESSURE: 66 MMHG | WEIGHT: 150 LBS | BODY MASS INDEX: 26.58 KG/M2 | SYSTOLIC BLOOD PRESSURE: 110 MMHG | HEIGHT: 63 IN

## 2019-07-16 DIAGNOSIS — Z12.39 SCREENING FOR BREAST CANCER: ICD-10-CM

## 2019-07-16 DIAGNOSIS — Z01.419 ENCOUNTER FOR WELL WOMAN EXAM: Primary | ICD-10-CM

## 2019-07-16 DIAGNOSIS — Z78.0 POSTMENOPAUSAL: ICD-10-CM

## 2019-07-16 PROCEDURE — S0612 ANNUAL GYNECOLOGICAL EXAMINA: HCPCS | Performed by: PHYSICIAN ASSISTANT

## 2019-07-16 NOTE — PROGRESS NOTES
Assessment/Plan:    No problem-specific Assessment & Plan notes found for this encounter  Diagnoses and all orders for this visit:    Encounter for well woman exam    Screening for breast cancer  -     Mammo screening bilateral w 3d & cad; Future    Postmenopausal          Subjective:      Patient ID: Sujey Otero is a 82 y o  female  Pt presents for her annual exam today--  She has no complaints  She has no bleeding or pelvic pain--postmeno  Bowel and bladder are regular  Colonoscopy--2016--due soon  No breast concerns today  Last mammo--10/18  Last dexa 2018    No pap today  rx mammo        The following portions of the patient's history were reviewed and updated as appropriate: allergies, current medications, past family history, past medical history, past social history, past surgical history and problem list     Review of Systems   Constitutional: Negative for chills, fever and unexpected weight change  Gastrointestinal: Negative for abdominal pain, blood in stool, constipation and diarrhea  Genitourinary: Negative  Objective:      /66 (BP Location: Right arm, Patient Position: Sitting, Cuff Size: Standard)   Ht 5' 3" (1 6 m)   Wt 68 kg (150 lb)   BMI 26 57 kg/m²          Physical Exam   Constitutional: She appears well-developed and well-nourished  HENT:   Head: Normocephalic and atraumatic  Neck: Normal range of motion  Pulmonary/Chest: Right breast exhibits no inverted nipple, no mass, no nipple discharge and no skin change  Left breast exhibits no inverted nipple, no mass, no nipple discharge and no skin change  Abdominal: Soft  Genitourinary: Vagina normal and uterus normal  Pelvic exam was performed with patient supine  There is no rash, tenderness or lesion on the right labia  There is no rash, tenderness or lesion on the left labia  Cervix exhibits no motion tenderness, no discharge and no friability   Right adnexum displays no mass, no tenderness and no fullness  Left adnexum displays no mass, no tenderness and no fullness  Genitourinary Comments: atrophy   Lymphadenopathy: No inguinal adenopathy noted on the right or left side  Nursing note and vitals reviewed

## 2019-07-16 NOTE — PROGRESS NOTES
Patient is here for yearly exam   Patient doing well, no bleeding or pelvic pain  No breast concerns and B&B working well  6/1/16 Normal Pap    10/23/18 Dexa:No siginifcant changes  10/23/18 Normal Mammo

## 2019-08-05 NOTE — PSYCH
MEDICATION MANAGEMENT NOTE        [facility]      Name and Date of Birth:  Majo Dennison 82 y o  1935    Date of Visit: 2019    HPI:    Majo Dennison is here for medication review with primary c/o / Area of need: "Try not to fret with things"--she reports not really anxious but sometimes "Annoyed" --ie most recent trigger is her son's and his wife's keeping of many objects  Pt does not feel they are hoarders, but they keep things she would normally throw away  She has not needed Clonazepam much at all but did use it for the  of a family friend  She has seen other friends of her card group pass this past year, but Pt has a calm, practical and accepting attitude about the progression of life and death  She realizes she is older in age and has decided to keep a positive attitude and reminisce about her passed loved ones, rather than stress over her own mortality  Pt presently denies depressed, SI, HI, panic attacks, or manic or psychotic Sxs  She continues volunteerism at [facility] at least once weekly  Appetite Changes and Sleep: normal sleep, normal appetite, normal energy level    Review Of Systems:      Constitutional negative   ENT negative   Cardiovascular negative   Respiratory negative   Gastrointestinal negative   Genitourinary negative   Musculoskeletal negative   Integumentary negative   Neurological negative   Endocrine negative   Other Symptoms none       Past Psychiatric History:   As copied from my 2019 note with updates as needed:  " [ Pt was first diagnosed with a psychiatric illness (depression and anxiety) during an admission to the [place] [address] for severe Sxs 3/11/2016 - 3/17/2016  Sxs were sad mood, anhedonia, reduced appetite, insomnia, hopelessness, and passive death wish but no intent or plan of suicide   Sxs started around the holidays of  and became severe within 2 months of admission when Pt moved to a new apartment  Pt also had Sxs of anxiety for at least 5-6 weeks prior to admission with worry over moving primarily  She realized her financial worries were unfounded  She moved back in with one of her sons after discharge      After discharge, Pt was enrolled in the [facility] from 3/21/2016 - 4/5/2016  She continued the same discharge medicines, Paxil and Clonazepam until outpatient f/u  She was first seen for outpatient eval by Dr Ritika Gamino 4/7/2016  She started seeing Mrs Williamson in 5/10/2016 for psychotherapy and it was stopped by mutual decision after 7/18/2016 visit      No other hospitalizations   She denies any h/o suicide attempts, self-mutilating/other self harm behaviors, violent behavior, ECT, milana legal or  Hx     Traumatic History:      Abuse: none  Other Traumatic Events: none                                                                 ] "    Past Medical History:    Past Medical History:   Diagnosis Date    Anxiety     Cancer ([address] )     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    Depression     HTN (hypertension)     HTN (hypertension)     Hyperlipidemia     Osteopenia     Skin cancer     Squamous cell on Left cheek       Substance Abuse History:    Social History     Substance and Sexual Activity   Alcohol Use No     Social History     Substance and Sexual Activity   Drug Use No       Social History:    Social History     Socioeconomic History    Marital status:      Spouse name: Not on file    Number of children: 3    Years of education: Not on file    Highest education level: Not on file   Occupational History    Occupation: retired nurse   Social Needs    Financial resource strain: Not hard at all   Paton-Rufino insecurity:     Worry: Never true     Inability: Never true    Transportation needs:     Medical: No     Non-medical: No   Tobacco Use    Smoking status: Never Smoker    Smokeless tobacco: Never Used   Substance and Sexual Activity    Alcohol use: No    Drug use: No    Sexual activity: Not Currently   Lifestyle    Physical activity:     Days per week: 0 days     Minutes per session: Not on file    Stress: Not on file   Relationships    Social connections:     Talks on phone: Not on file     Gets together: Not on file     Attends Quaker service: Not on file     Active member of club or organization: Not on file     Attends meetings of clubs or organizations: Not on file     Relationship status: Not on file    Intimate partner violence:     Fear of current or ex partner: Not on file     Emotionally abused: Not on file     Physically abused: Not on file     Forced sexual activity: Not on file   Other Topics Concern    Not on file   Social History Narrative    Home: Lives with one of her sons Melony Stacy and his family    Pt  first --he  after the fact    Has 4 Children--sons born [id number], 80 (this son  in 18), 65        Education:     Pt denies any h/o learning disabilities and reached childhood milestones on time as far as she knows    Graduated high school [employer]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    RN degree from [employer]       Family Psychiatric History:     Family History   Problem Relation Age of Onset    Depression Mother     Hypertension Mother     Breast cancer Mother     Diabetes Mother     Lymphoma Father        History Review:  The following portions of the patient's history were reviewed and updated as appropriate: allergies, current medications, past family history, past medical history, past social history, past surgical history and problem list          OBJECTIVE:     Mental Status Evaluation:    Appearance Casually dressed, good eye contact and hygiene   Behavior Calm, cooperative, pleasant   Speech Clear, normal rate and volume   Mood Euthymic   Affect Normal range and intensity   Thought Processes Organized, goal directed, a bit hyper-verbal, but not pressured   Associations intact associations   Thought Content No delusions   Perceptual Disturbances: Pt denies any form of hallucinations and does not appear to be responding to internal stimuli   Abnormal Thoughts  Risk Potential Suicidal ideation - None  Homicidal ideation - None  Potential for aggression - No   Orientation oriented to person, place, situation, day of week, date, month of year and year   Memory short term memory grossly intact   Cosciousness alert and awake   Attention Span attention span and concentration are age appropriate   Intellect appears to be of average intelligence   Insight good   Judgement good   Muscle Strength and  Gait normal gait and normal balance   Language no difficulty naming common objects, no difficulty repeating a phrase, no difficulty writing a sentence   Fund of Knowledge adequate knowledge of current events  adequate fund of knowledge regarding past history  adequate fund of knowledge regarding vocabulary    Pain none   Pain Scale 0       Laboratory Results:  No new medications since last visit    Assessment/plan:       Diagnoses and all orders for this visit:    Generalized anxiety disorder    Major depression [facility] ([facility] )        PLAN:  Pt appears stable with anxiety in good control with the Clonazepam which I will continue unchanged  Treatment plan done and Pt accepts the plan  Continue:  Clonazepam 0 5mg (1/4-1/2) tab po qd prn anxiety # --Pt not needing a new Rx at present, she has pills from last Rx  Vit D --OTC preparation  Return 12 weeks, call sooner prn              Risks/Benefits      Risks, Benefits And Possible Side Effects Of Medications:    Risks, benefits, and possible side effects of medications explained to Danny Villeda and she verbalizes understanding and agreement for treatment

## 2019-08-06 ENCOUNTER — OFFICE VISIT (OUTPATIENT)
Dept: PSYCHIATRY | Facility: CLINIC | Age: 84
End: 2019-08-06
Payer: COMMERCIAL

## 2019-08-06 VITALS — WEIGHT: 149 LBS | BODY MASS INDEX: 29.25 KG/M2 | HEIGHT: 60 IN

## 2019-08-06 DIAGNOSIS — F41.1 GENERALIZED ANXIETY DISORDER: Primary | ICD-10-CM

## 2019-08-06 DIAGNOSIS — F32.5 MAJOR DEPRESSION IN REMISSION (HCC): ICD-10-CM

## 2019-08-06 PROCEDURE — 99213 OFFICE O/P EST LOW 20 MIN: CPT | Performed by: PHYSICIAN ASSISTANT

## 2019-08-06 NOTE — BH TREATMENT PLAN
TREATMENT PLAN (Medication Management Only)        [facility]    Name and Date of Birth:  Chikis Coffman 82 y o  1935  Date of Treatment Plan: August 6, 2019  Diagnosis/Diagnoses:    1  Generalized anxiety disorder    2  Major depression in remission [facility])      Strengths/Personal Resources for Self-Care: "I'm always open to ask for help if I need it;  I think I'm a good listener;  I like the cat, it's not mine, it's my son's"  Area/Areas of need (in own words): "Try not to fret with things"  1  Long Term Goal: "To remain healthy to keep active as longa s possible" per Pt  Target Date: 2 months - 10/6/2019  Person/Persons responsible for completion of goal: Radha Herron   2  Short Term Objective (s) - How will we reach this goal?:   A  Provider new recommended medication/dosage changes and/or continue medication(s): Pt appears stable with anxiety in good control with the Clonazepam which I will continue unchanged  Treatment plan done and Pt accepts the plan  Continue:  Clonazepam 0 5mg (1/4-1/2) tab po qd prn anxiety # --Pt not needing a new Rx at present, she has pills from last Rx  Vit D --OTC preparation  Return 12 weeks, call sooner prn   Target Date: 3-6 months  Person/Persons Responsible for Completion of Goal: Radha Herron  Progress Towards Goals: continuing treatment  Treatment Modality: Medication mgt  Review due 90 to 120 days from date of this plan: 3 months - 11/6/2019  Expected length of service: ongoing treatment  My Physician/PA/NP and I have developed this plan together and I agree to work on the goals and objectives  I understand the treatment goals that were developed for my treatment

## 2019-10-28 ENCOUNTER — OFFICE VISIT (OUTPATIENT)
Dept: PSYCHIATRY | Facility: CLINIC | Age: 84
End: 2019-10-28
Payer: COMMERCIAL

## 2019-10-28 VITALS
SYSTOLIC BLOOD PRESSURE: 131 MMHG | WEIGHT: 149.25 LBS | DIASTOLIC BLOOD PRESSURE: 79 MMHG | BODY MASS INDEX: 29.3 KG/M2 | HEART RATE: 63 BPM | HEIGHT: 60 IN

## 2019-10-28 DIAGNOSIS — F41.1 GENERALIZED ANXIETY DISORDER: Primary | ICD-10-CM

## 2019-10-28 DIAGNOSIS — F32.5 MAJOR DEPRESSION IN REMISSION (HCC): ICD-10-CM

## 2019-10-28 DIAGNOSIS — E55.9 VITAMIN D DEFICIENCY: ICD-10-CM

## 2019-10-28 PROCEDURE — 99214 OFFICE O/P EST MOD 30 MIN: CPT | Performed by: PHYSICIAN ASSISTANT

## 2019-10-28 NOTE — BH TREATMENT PLAN
TREATMENT PLAN (Medication Management Only)        [facility]    Name and Date of Birth:  Flash Mcdaniel 82 y o  1935  Date of Treatment Plan: October 28, 2019  Diagnosis/Diagnoses:    1  Generalized anxiety disorder    2  Major depression in remission ([facility] [address] )    3  Vitamin D deficiency      Strengths/Personal Resources for Self-Care: "Well organized; Enjoy being with people; Kind"  Area/Areas of need (in own words): "I feel good"  1  Long Term Goal: Maintain mood stability and anxiety control  Target Date: 2 months - 12/28/2019  Person/Persons responsible for completion of goal: Vernetta Ormond  2  Short Term Objective (s) - How will we reach this goal?:   A  Provider new recommended medication/dosage changes and/or continue medication(s): Sera Martinez appears stable and anxiety is under control  She does not feel that she requires further psychiatric visits and that she would like her PMD to prescribe the BZD in the future  I agree that she is doing very well and I have no reservations about mutually terminating her psychiatric visits  In the mean time, she will make an appt with psychiatry and call to cancel if he will take over prescribing the BZD  She does not have interest in psychotherapy at this time and feels well  Treatment plan done and she accepts the plan     Continue:  Clonazepam 0 5mg (1/4-1/2) tab po qd prn anxiety --She is not needing a new Rx at present, she has pills from last Rx which was in 3/2019 and was used up per the PDMP  Vit D --OTC preparation  Return 16 weeks, call sooner prn   Target Date: Pt is at a baseline  Person/Persons Responsible for Completion of Goal: Vernetta Ormond   Progress Towards Goals: stable, continuing treatment  Treatment Modality: Medication mgt  Review due 90 to 120 days from date of this plan: 4-6 months  Expected length of service: ongoing treatment  My Physician/PA/NP and I have developed this plan together and I agree to work on the goals and objectives  I understand the treatment goals that were developed for my treatment

## 2020-08-11 ENCOUNTER — TRANSCRIBE ORDERS (OUTPATIENT)
Dept: ADMINISTRATIVE | Facility: HOSPITAL | Age: 85
End: 2020-08-11

## 2020-08-11 DIAGNOSIS — R10.9 ABDOMINAL PAIN, UNSPECIFIED ABDOMINAL LOCATION: Primary | ICD-10-CM

## 2020-08-20 ENCOUNTER — HOSPITAL ENCOUNTER (OUTPATIENT)
Dept: RADIOLOGY | Facility: MEDICAL CENTER | Age: 85
Discharge: HOME | End: 2020-08-20
Payer: COMMERCIAL

## 2020-08-20 DIAGNOSIS — R10.9 ABDOMINAL PAIN, UNSPECIFIED ABDOMINAL LOCATION: ICD-10-CM

## 2020-08-20 PROCEDURE — 76700 US EXAM ABDOM COMPLETE: CPT

## 2020-10-05 ENCOUNTER — OFFICE VISIT (OUTPATIENT)
Dept: OBGYN CLINIC | Facility: CLINIC | Age: 85
End: 2020-10-05
Payer: COMMERCIAL

## 2020-10-05 VITALS
SYSTOLIC BLOOD PRESSURE: 130 MMHG | DIASTOLIC BLOOD PRESSURE: 80 MMHG | HEIGHT: 60 IN | WEIGHT: 153 LBS | BODY MASS INDEX: 30.04 KG/M2

## 2020-10-05 DIAGNOSIS — Z78.0 POSTMENOPAUSAL: Primary | ICD-10-CM

## 2020-10-05 DIAGNOSIS — Z12.31 ENCOUNTER FOR SCREENING MAMMOGRAM FOR BREAST CANCER: ICD-10-CM

## 2020-10-05 DIAGNOSIS — N95.2 VAGINAL ATROPHY: ICD-10-CM

## 2020-10-05 PROCEDURE — 99213 OFFICE O/P EST LOW 20 MIN: CPT | Performed by: PHYSICIAN ASSISTANT

## 2020-10-05 RX ORDER — MELATONIN
1000 DAILY
COMMUNITY
End: 2021-04-02

## 2021-01-27 ENCOUNTER — IMMUNIZATIONS (OUTPATIENT)
Dept: FAMILY MEDICINE CLINIC | Facility: HOSPITAL | Age: 86
End: 2021-01-27

## 2021-01-27 DIAGNOSIS — Z23 ENCOUNTER FOR IMMUNIZATION: Primary | ICD-10-CM

## 2021-01-27 PROCEDURE — 91301 SARS-COV-2 / COVID-19 MRNA VACCINE (MODERNA) 100 MCG: CPT

## 2021-01-27 PROCEDURE — 0011A SARS-COV-2 / COVID-19 MRNA VACCINE (MODERNA) 100 MCG: CPT

## 2021-02-23 ENCOUNTER — IMMUNIZATIONS (OUTPATIENT)
Dept: FAMILY MEDICINE CLINIC | Facility: HOSPITAL | Age: 86
End: 2021-02-23

## 2021-02-23 DIAGNOSIS — Z23 ENCOUNTER FOR IMMUNIZATION: Primary | ICD-10-CM

## 2021-02-23 PROCEDURE — 0012A SARS-COV-2 / COVID-19 MRNA VACCINE (MODERNA) 100 MCG: CPT

## 2021-02-23 PROCEDURE — 91301 SARS-COV-2 / COVID-19 MRNA VACCINE (MODERNA) 100 MCG: CPT

## 2021-03-11 ENCOUNTER — PREP FOR PROCEDURE (OUTPATIENT)
Dept: GASTROENTEROLOGY | Facility: CLINIC | Age: 86
End: 2021-03-11

## 2021-03-11 DIAGNOSIS — R19.4 BOWEL HABIT CHANGES: ICD-10-CM

## 2021-03-11 DIAGNOSIS — D12.6 TUBULAR ADENOMA OF COLON: ICD-10-CM

## 2021-03-11 DIAGNOSIS — Z86.010 HISTORY OF COLON POLYPS: Primary | ICD-10-CM

## 2021-04-02 RX ORDER — LISINOPRIL 10 MG/1
1 TABLET ORAL DAILY
COMMUNITY

## 2021-04-02 RX ORDER — MELATONIN
1000 DAILY
COMMUNITY

## 2021-04-02 RX ORDER — HYDROCHLOROTHIAZIDE 12.5 MG/1
1 CAPSULE, GELATIN COATED ORAL DAILY
COMMUNITY

## 2021-04-06 ENCOUNTER — HOSPITAL ENCOUNTER (OUTPATIENT)
Dept: GASTROENTEROLOGY | Facility: AMBULATORY SURGERY CENTER | Age: 86
Discharge: HOME | End: 2021-04-06
Payer: COMMERCIAL

## 2021-04-06 ENCOUNTER — ANESTHESIA EVENT (OUTPATIENT)
Dept: GASTROENTEROLOGY | Facility: AMBULATORY SURGERY CENTER | Age: 86
End: 2021-04-06

## 2021-04-06 ENCOUNTER — ANESTHESIA (OUTPATIENT)
Dept: GASTROENTEROLOGY | Facility: AMBULATORY SURGERY CENTER | Age: 86
End: 2021-04-06

## 2021-04-06 VITALS
RESPIRATION RATE: 18 BRPM | WEIGHT: 152 LBS | TEMPERATURE: 96.1 F | HEIGHT: 63 IN | OXYGEN SATURATION: 97 % | BODY MASS INDEX: 26.93 KG/M2 | DIASTOLIC BLOOD PRESSURE: 62 MMHG | HEART RATE: 78 BPM | SYSTOLIC BLOOD PRESSURE: 107 MMHG

## 2021-04-06 DIAGNOSIS — R19.4 BOWEL HABIT CHANGES: ICD-10-CM

## 2021-04-06 DIAGNOSIS — Z86.010 HISTORY OF COLON POLYPS: ICD-10-CM

## 2021-04-06 DIAGNOSIS — D12.6 TUBULAR ADENOMA OF COLON: ICD-10-CM

## 2021-04-06 PROBLEM — I10 HTN (HYPERTENSION): Status: ACTIVE | Noted: 2021-04-06

## 2021-04-06 PROBLEM — Z86.0101 HX OF ADENOMATOUS COLONIC POLYPS: Status: ACTIVE | Noted: 2021-04-06

## 2021-04-06 PROCEDURE — 00811 ANES LWR INTST NDSC NOS: CPT | Performed by: NURSE ANESTHETIST, CERTIFIED REGISTERED

## 2021-04-06 PROCEDURE — 99100 ANES PT EXTEME AGE<1 YR&>70: CPT | Performed by: NURSE ANESTHETIST, CERTIFIED REGISTERED

## 2021-04-06 PROCEDURE — 45385 COLONOSCOPY W/LESION REMOVAL: CPT | Performed by: INTERNAL MEDICINE

## 2021-04-06 PROCEDURE — 45380 COLONOSCOPY AND BIOPSY: CPT | Performed by: INTERNAL MEDICINE

## 2021-04-06 RX ORDER — PROPOFOL 10 MG/ML
INJECTION, EMULSION INTRAVENOUS AS NEEDED
Status: DISCONTINUED | OUTPATIENT
Start: 2021-04-06 | End: 2021-04-06

## 2021-04-06 RX ORDER — SODIUM CHLORIDE 9 MG/ML
50 INJECTION, SOLUTION INTRAVENOUS CONTINUOUS
Status: CANCELLED | OUTPATIENT
Start: 2021-04-06

## 2021-04-06 RX ORDER — SODIUM CHLORIDE 9 MG/ML
20 INJECTION, SOLUTION INTRAVENOUS CONTINUOUS
Status: DISCONTINUED | OUTPATIENT
Start: 2021-04-06 | End: 2021-04-10 | Stop reason: HOSPADM

## 2021-04-06 RX ORDER — SODIUM CHLORIDE 9 MG/ML
INJECTION, SOLUTION INTRAVENOUS CONTINUOUS PRN
Status: DISCONTINUED | OUTPATIENT
Start: 2021-04-06 | End: 2021-04-06

## 2021-04-06 RX ORDER — SODIUM CHLORIDE 9 MG/ML
30 INJECTION, SOLUTION INTRAVENOUS CONTINUOUS
Status: DISCONTINUED | OUTPATIENT
Start: 2021-04-06 | End: 2021-04-10 | Stop reason: HOSPADM

## 2021-04-06 RX ADMIN — PROPOFOL 10 MG: 10 INJECTION, EMULSION INTRAVENOUS at 10:22

## 2021-04-06 RX ADMIN — PROPOFOL 10 MG: 10 INJECTION, EMULSION INTRAVENOUS at 10:19

## 2021-04-06 RX ADMIN — PROPOFOL 20 MG: 10 INJECTION, EMULSION INTRAVENOUS at 10:15

## 2021-04-06 RX ADMIN — PROPOFOL 100 MG: 10 INJECTION, EMULSION INTRAVENOUS at 10:13

## 2021-04-06 RX ADMIN — PROPOFOL 10 MG: 10 INJECTION, EMULSION INTRAVENOUS at 10:24

## 2021-04-06 RX ADMIN — PROPOFOL 10 MG: 10 INJECTION, EMULSION INTRAVENOUS at 10:21

## 2021-04-06 RX ADMIN — PROPOFOL 20 MG: 10 INJECTION, EMULSION INTRAVENOUS at 10:17

## 2021-04-06 RX ADMIN — SODIUM CHLORIDE: 9 INJECTION, SOLUTION INTRAVENOUS at 10:05

## 2021-04-06 NOTE — DISCHARGE INSTRUCTIONS
High Fiber Diet   WHAT YOU NEED TO KNOW:   What is a high-fiber diet? A high-fiber diet includes foods that have a high amount of fiber  Fiber is the part of fruits, vegetables, and grains that is not broken down by your body  Fiber keeps your bowel movements regular  Fiber can also help lower your cholesterol level, control blood sugar in people with diabetes, and relieve constipation  Fiber can also help you control your weight because it helps you feel full faster  Most adults should eat 25 to 35 grams of fiber each day  Talk to your dietitian or healthcare provider about the amount of fiber you need  What foods are good sources of fiber? · Foods with at least 4 grams of fiber per serving:      ? ? to ½ cup of high-fiber cereal (check the nutrition label on the box)    ? ½ cup of blackberries or raspberries    ? 4 dried prunes    ? 1 cooked artichoke    ? ½ cup of cooked legumes, such as lentils, or red, kidney, and card beans    · Foods with 1 to 3 grams of fiber per serving:      ? 1 slice of whole-wheat, pumpernickel, or rye bread    ? ½ cup of cooked brown rice    ? 4 whole-wheat crackers    ? 1 cup of oatmeal    ? ½ cup of cereal with 1 to 3 grams of fiber per serving (check the nutrition label on the box)    ? 1 small piece of fruit, such as an apple, banana, pear, kiwi, or orange    ? 3 dates    ? ½ cup of canned apricots, fruit cocktail, peaches, or pears    ? ½ cup of raw or cooked vegetables, such as carrots, cauliflower, cabbage, spinach, squash, or corn  What are some ways that I can increase fiber in my diet? · Choose brown or wild rice instead of white rice  · Use whole wheat flour in recipes instead of white or all-purpose flour  · Add beans and peas to casseroles or soups  · Choose fresh fruit and vegetables with peels or skins on instead of juices  What other guidelines should I follow? · Add fiber to your diet slowly    You may have abdominal discomfort, bloating, and gas if you add fiber to your diet too quickly  · Drink plenty of liquids as you add fiber to your diet  You may have nausea or develop constipation if you do not drink enough water  Ask how much liquid to drink each day and which liquids are best for you  CARE AGREEMENT:   You have the right to help plan your care  Discuss treatment options with your healthcare provider to decide what care you want to receive  You always have the right to refuse treatment  The above information is an  only  It is not intended as medical advice for individual conditions or treatments  Talk to your doctor, nurse or pharmacist before following any medical regimen to see if it is safe and effective for you  © Copyright [address] Information is for End User's use only and may not be sold, redistributed or otherwise used for commercial purposes  All illustrations and images included in CareNotes® are the copyrighted property of [employer]  or [facility] [employer]land Duque   Diverticulosis   WHAT YOU NEED TO KNOW:   What is diverticulosis? Diverticulosis is a condition that causes small pockets called diverticula to form in your intestine  These pockets make it difficult for bowel movements to pass through your digestive system  What causes diverticulosis? Diverticula form when muscles have to work hard to move bowel movements through the intestine  The force causes bulges to form at weak areas in the intestine  This may happen if you eat foods that are low in fiber  Fiber helps give your bowel movements more bulk so they are larger and easier to move through your colon  The following may increase your risk of diverticulosis:  · A history of constipation    · Age 36 or older    · Obesity    · Lack of exercise    What are the signs and symptoms of diverticulosis? Diverticulosis usually does not cause any signs or symptoms   It may cause any of the following in some people:  · Pain or discomfort in your lower abdomen    · Abdominal bloating    · Constipation or diarrhea    How is diverticulosis diagnosed? Your healthcare provider will examine you and ask about your bowel movements, diet, and symptoms  He or she will also ask about any medical conditions you have or medicines you take  You may need any of the following:  · Blood tests  may be done to check for signs of inflammation  · A barium enema  is an x-ray of your colon that may show diverticula  A tube is put into your anus, and a liquid called barium is put through the tube  Barium is used so that healthcare providers can see your colon more clearly  · Flexible sigmoidoscopy  is a test to look for any changes in your lower intestines and rectum  It may also show the cause of any bleeding or pain  A soft, bendable tube with a light on the end will be put into your anus  It will then be moved forward into your intestine  · A colonoscopy  is used to look at your whole colon  A scope (long bendable tube with a light on the end) is used to take pictures  This test may show diverticula  · A CT scan , or CAT scan, may show diverticula  You may be given contrast liquid before the scan  Tell the healthcare provider if you have ever had an allergic reaction to contrast liquid  How is diverticulosis managed? The goal of treatment is to manage any symptoms you have and prevent other problems such as diverticulitis  Diverticulitis is swelling or infection of the diverticula  Your healthcare provider may recommend any of the following:  · Eat a variety of high-fiber foods  High-fiber foods help you have regular bowel movements  High-fiber foods include cooked beans, fruits, vegetables, and some cereals  Most adults need 25 to 35 grams of fiber each day  Your healthcare provider may recommend that you have more  Ask your healthcare provider how much fiber you need  Increase fiber slowly   You may have abdominal discomfort, bloating, and gas if you add fiber to your diet too quickly  You may need to take a fiber supplement if you are not getting enough fiber from food  · Medicines  to soften your bowel movements may be given  You may also need medicines to treat symptoms such as bloating and pain  · Drink liquids as directed  You may need to drink 2 to 3 liters (8 to 12 cups) of liquids every day  Ask your healthcare provider how much liquid to drink each day and which liquids are best for you  · Apply heat  on your abdomen for 20 to 30 minutes every 2 hours for as many days as directed  Heat helps decrease pain and muscle spasms  How can I help prevent diverticulitis or other symptoms? The following may help decrease your risk for diverticulitis or symptoms, such as bleeding  Talk to your provider about these or other things you can do to prevent problems that may occur with diverticulosis  · Exercise regularly  Ask your healthcare provider about the best exercise plan for you  Exercise can help you have regular bowel movements  Get 30 minutes of exercise on most days of the week  · Maintain a healthy weight  Ask your healthcare provider how much you should weigh  Ask him or her to help you create a weight loss plan if you are overweight  · Do not smoke  Nicotine and other chemicals in cigarettes increase your risk for diverticulitis  Ask your healthcare provider for information if you currently smoke and need help to quit  E-cigarettes or smokeless tobacco still contain nicotine  Talk to your healthcare provider before you use these products  · Ask your healthcare provider if it is safe to take NSAIDs  NSAIDs may increase your risk of diverticulitis  When should I seek immediate care? · You have severe pain on the left side of your lower abdomen  · Your bowel movements are bright or dark red  When should I contact my healthcare provider? · You have a fever and chills  · You feel dizzy or lightheaded       · You have nausea, or you are vomiting  · You have a change in your bowel movements  · You have questions or concerns about your condition or care  CARE AGREEMENT:   You have the right to help plan your care  Learn about your health condition and how it may be treated  Discuss treatment options with your healthcare providers to decide what care you want to receive  You always have the right to refuse treatment  The above information is an  only  It is not intended as medical advice for individual conditions or treatments  Talk to your doctor, nurse or pharmacist before following any medical regimen to see if it is safe and effective for you  © Copyright [address] Information is for End User's use only and may not be sold, redistributed or otherwise used for commercial purposes  All illustrations and images included in CareNotes® are the copyrighted property of [employer]  or [address] [place] [address]  Colorectal Polyps   WHAT YOU NEED TO KNOW:   What are colorectal polyps? Colorectal polyps are small growths of tissue in the lining of the colon and rectum  Most polyps are hyperplastic polyps and are usually benign (noncancerous)  Certain types of polyps, called adenomatous polyps, may turn into cancer  What increases my risk of colorectal polyps? The exact cause of colorectal polyps is unknown  The following may increase your risk:  · Older age    · A diet of foods high in fat and low in fiber     · Family history of polyps    · Intestinal diseases, such as Crohn's disease or ulcerative colitis    · An unhealthy lifestyle, such as physical inactivity, smoking, or drinking alcohol    · Obesity    What are the signs and symptoms of colorectal polyps? · Blood in your bowel movement or bleeding from the rectum    · Change in bowel movement habits, such as diarrhea and constipation    · Abdominal pain    How are colorectal polyps diagnosed?   You should have fecal blood screening once a year for colorectal disease if you are over 48years old  You should be screened earlier if you have an intestinal disease or a family history of polyps or colorectal cancer  During this screening, a sample of your bowel movement is checked for blood, which may be an early sign of colorectal polyps or cancer  You may also need any of the following tests:  · Digital rectal exam:  Your healthcare provider will examine your anus and use a finger to check your rectum for polyps  · Barium enema: A barium enema is an x-ray of the colon  A tube is put into your anus, and a liquid called barium is put through the tube  Barium is used so that healthcare providers can see your colon better on the x-ray film  · Virtual colonoscopy: This is a CT scan that takes pictures of the inside of your colon and rectum  A small, flexible tube is put into your rectum and air or carbon dioxide (gas) is used to expand your colon  This lets healthcare providers clearly see your colon and any polyps on a monitor  · Colonoscopy or sigmoidoscopy: These procedures help your healthcare provider see the inside of your colon using a flexible tube with a small light and camera on the end  During a sigmoidoscopy, your healthcare provider will only look at rectum and lower colon  During a colonoscopy, healthcare providers will look at the full length of your colon  Healthcare providers may remove a small amount of tissue from the colon for a biopsy  How are colorectal polyps treated? A polypectomy is a minimally invasive procedure to remove your polyps  They may be removed during a colonoscopy or sigmoidoscopy  Your healthcare provider may need to remove the polyps with a laparoscope  Laparoscopy is done by inserting a small, flexible scope into incisions made on your abdomen  What are the risks of colorectal polyps? You may bleed during a colonoscopy procedure  Your bowel may be perforated (torn) when polyps are removed   This may lead to an open abdominal surgery  During surgery, you may bleed too much or get an infection  Adenomatous polyps that are not removed may turn into cancer and become more difficult to treat  Where can I find support and more information? · Jaysonpj Workman ([facility])  [address] , [place] [address]  Phone: [phone]  Web Address: Gloria Rosa  [facility] nih gov    When should I contact my healthcare provider? · You have a fever  · You have chills, a cough, or feel weak and achy  · You have abdominal pain that does not go away or gets worse after you take medicine  · Your abdomen is swollen  · You are losing weight without trying  · You have questions or concerns about your condition or care  When should I seek immediate care or call 911? · You have sudden shortness of breath  · You have a fast heart rate, fast breathing, or are too dizzy to stand up  · You have severe abdominal pain  · You see blood in your bowel movement  CARE AGREEMENT:   You have the right to help plan your care  Learn about your health condition and how it may be treated  Discuss treatment options with your healthcare providers to decide what care you want to receive  You always have the right to refuse treatment  The above information is an  only  It is not intended as medical advice for individual conditions or treatments  Talk to your doctor, nurse or pharmacist before following any medical regimen to see if it is safe and effective for you  © Copyright [address] Information is for End User's use only and may not be sold, redistributed or otherwise used for commercial purposes   All illustrations and images included in CareNotes® are the copyrighted property of [employer]  or [address] [place] [address] [facility]

## 2021-04-06 NOTE — ANESTHESIA PREPROCEDURE EVALUATION
Procedure:  COLONOSCOPY    Relevant Problems   CARDIO   (+) HTN (hypertension)      NEURO/PSYCH   (+) Generalized anxiety disorder   (+) Hx of adenomatous colonic polyps   (+) Major depression in remission (HCC)      PULMONARY   (+) Acute URI        Physical Exam    Airway    Mallampati score: II  TM Distance: >3 FB  Neck ROM: full     Dental       Cardiovascular  Rhythm: regular, Rate: normal,     Pulmonary  Breath sounds clear to auscultation,     Other Findings        Anesthesia Plan  ASA Score- 2     Anesthesia Type- IV sedation with anesthesia with ASA Monitors  Additional Monitors:   Airway Plan:           Plan Factors-Exercise tolerance (METS): >4 METS  Patient is not a current smoker  Induction- intravenous  Postoperative Plan-     Informed Consent- Anesthetic plan and risks discussed with patient

## 2021-04-06 NOTE — ANESTHESIA POSTPROCEDURE EVALUATION
Post-Op Assessment Note    CV Status:  Stable  Pain Score: 0    Pain management: adequate     Mental Status:  Alert and awake   Hydration Status:  Euvolemic   PONV Controlled:  Controlled   Airway Patency:  Patent      Post Op Vitals Reviewed: Yes      Staff: CRNA         No complications documented      BP   111/67   Temp     Pulse  64   Resp   18   SpO2   98%

## 2023-02-13 ENCOUNTER — NEW PATIENT COMPREHENSIVE (OUTPATIENT)
Dept: URBAN - METROPOLITAN AREA CLINIC 6 | Facility: CLINIC | Age: 88
End: 2023-02-13

## 2023-02-13 DIAGNOSIS — H25.811: ICD-10-CM

## 2023-02-13 DIAGNOSIS — H26.492: ICD-10-CM

## 2023-02-13 DIAGNOSIS — Z96.1: ICD-10-CM

## 2023-02-13 PROCEDURE — 99204 OFFICE O/P NEW MOD 45 MIN: CPT

## 2023-02-13 ASSESSMENT — VISUAL ACUITY
OD_CC: 20/40-1
OS_CC: 20/25-1
OS_SC: J5-2
OD_PH: 20/40+2
OD_SC: J2+2

## 2023-02-13 ASSESSMENT — TONOMETRY
OD_IOP_MMHG: 13
OS_IOP_MMHG: 12

## 2024-02-21 PROBLEM — J06.9 ACUTE URI: Status: RESOLVED | Noted: 2019-01-04 | Resolved: 2024-02-21

## 2024-03-21 ENCOUNTER — ESTABLISHED COMPREHENSIVE EXAM (OUTPATIENT)
Dept: URBAN - METROPOLITAN AREA CLINIC 6 | Facility: CLINIC | Age: 89
End: 2024-03-21

## 2024-03-21 DIAGNOSIS — Z96.1: ICD-10-CM

## 2024-03-21 DIAGNOSIS — H25.811: ICD-10-CM

## 2024-03-21 DIAGNOSIS — H26.492: ICD-10-CM

## 2024-03-21 PROCEDURE — 92014 COMPRE OPH EXAM EST PT 1/>: CPT

## 2024-03-21 ASSESSMENT — VISUAL ACUITY
OD_PH: 20/40-1
OD_CC: 20/60-1
OS_CC: 20/25-1

## 2024-03-21 ASSESSMENT — TONOMETRY
OS_IOP_MMHG: 11
OD_IOP_MMHG: 11

## 2024-09-09 ENCOUNTER — OFFICE VISIT (OUTPATIENT)
Dept: GYNECOLOGY | Facility: CLINIC | Age: 89
End: 2024-09-09
Payer: COMMERCIAL

## 2024-09-09 VITALS
SYSTOLIC BLOOD PRESSURE: 136 MMHG | DIASTOLIC BLOOD PRESSURE: 82 MMHG | BODY MASS INDEX: 29.83 KG/M2 | WEIGHT: 158 LBS | HEIGHT: 61 IN

## 2024-09-09 DIAGNOSIS — R35.0 FREQUENT URINATION: Primary | ICD-10-CM

## 2024-09-09 LAB
SL AMB  POCT GLUCOSE, UA: NEGATIVE
SL AMB LEUKOCYTE ESTERASE,UA: ABNORMAL
SL AMB POCT BILIRUBIN,UA: NEGATIVE
SL AMB POCT BLOOD,UA: ABNORMAL
SL AMB POCT CLARITY,UA: CLEAR
SL AMB POCT COLOR,UA: ABNORMAL
SL AMB POCT KETONES,UA: NEGATIVE
SL AMB POCT NITRITE,UA: NEGATIVE
SL AMB POCT PH,UA: 6
SL AMB POCT SPECIFIC GRAVITY,UA: 1.01
SL AMB POCT URINE PROTEIN: NEGATIVE
SL AMB POCT UROBILINOGEN: 0.2

## 2024-09-09 PROCEDURE — 81002 URINALYSIS NONAUTO W/O SCOPE: CPT | Performed by: PHYSICIAN ASSISTANT

## 2024-09-09 PROCEDURE — 99213 OFFICE O/P EST LOW 20 MIN: CPT | Performed by: PHYSICIAN ASSISTANT

## 2024-09-09 RX ORDER — MIRABEGRON 25 MG/1
25 TABLET, FILM COATED, EXTENDED RELEASE ORAL DAILY
COMMUNITY

## 2024-09-09 RX ORDER — CEPHALEXIN 500 MG/1
500 CAPSULE ORAL EVERY 12 HOURS SCHEDULED
Qty: 14 CAPSULE | Refills: 0 | Status: SHIPPED | OUTPATIENT
Start: 2024-09-09 | End: 2024-09-16

## 2024-09-09 RX ORDER — PAROXETINE 20 MG/1
20 TABLET, FILM COATED ORAL DAILY
COMMUNITY
Start: 2024-05-21

## 2024-09-09 RX ORDER — OXYBUTYNIN CHLORIDE 10 MG/1
TABLET, EXTENDED RELEASE ORAL
COMMUNITY
Start: 2024-08-27

## 2024-09-13 NOTE — PROGRESS NOTES
Assessment/Plan:      Diagnoses and all orders for this visit:    Frequent urination  -     POCT urine dip  -     cephalexin (KEFLEX) 500 mg capsule; Take 1 capsule (500 mg total) by mouth every 12 (twelve) hours for 7 days  -     Urine culture; Future  -     UA (URINE) with reflex to Scope; Future    Other orders  -     oxybutynin (DITROPAN-XL) 10 MG 24 hr tablet  -     PARoxetine (PAXIL) 20 mg tablet; Take 20 mg by mouth daily  -     Mirabegron ER (Myrbetriq) 25 MG TB24; Take 25 mg by mouth daily          Subjective:     Patient ID: Luba Barajas is a 89 y.o. female.    Pt is here for a problem today  She complains of urinary frequency and urgency x several months  Some days better, some days worse  Does lak  When she has to go she has to go  No pelvic pain  No back pain  No fever, chills    UA+++  Rx keflex  Can recheck urine after abx  Rec daily cranberry and probiotic        Review of Systems   Constitutional:  Negative for chills, fever and unexpected weight change.   HENT:  Negative for ear pain and sore throat.    Eyes:  Negative for pain and visual disturbance.   Respiratory:  Negative for cough and shortness of breath.    Cardiovascular:  Negative for chest pain and palpitations.   Gastrointestinal:  Negative for abdominal pain, blood in stool, constipation, diarrhea and vomiting.   Genitourinary:  Positive for frequency. Negative for dysuria and hematuria.   Musculoskeletal:  Negative for arthralgias and back pain.   Skin:  Negative for color change and rash.   Neurological:  Negative for seizures and syncope.   All other systems reviewed and are negative.        Objective:     Physical Exam  Vitals and nursing note reviewed.   Constitutional:       Appearance: Normal appearance. She is well-developed.   Genitourinary:     Exam position: Supine.      Labia:         Right: No rash or lesion.         Left: No rash or lesion.       Vagina: Normal.      Cervix: No cervical motion tenderness, discharge or  friability.   Lymphadenopathy:      Lower Body: No right inguinal adenopathy. No left inguinal adenopathy.   Neurological:      Mental Status: She is alert.

## 2024-09-19 LAB
APPEARANCE UR: CLEAR
BACTERIA UR QL AUTO: ABNORMAL /HPF
BILIRUB UR QL STRIP: NEGATIVE
COLOR UR: YELLOW
GLUCOSE UR QL STRIP: NEGATIVE
HGB UR QL STRIP: NEGATIVE
HYALINE CASTS #/AREA URNS LPF: ABNORMAL /LPF
KETONES UR QL STRIP: NEGATIVE
LEUKOCYTE ESTERASE UR QL STRIP: ABNORMAL
NITRITE UR QL STRIP: NEGATIVE
PH UR STRIP: 6.5 [PH] (ref 5–8)
PROT UR QL STRIP: NEGATIVE
RBC #/AREA URNS HPF: ABNORMAL /HPF
SP GR UR STRIP: 1.02 (ref 1–1.03)
SQUAMOUS #/AREA URNS HPF: ABNORMAL /HPF
WBC #/AREA URNS HPF: ABNORMAL /HPF

## 2024-09-25 ENCOUNTER — TELEPHONE (OUTPATIENT)
Age: 89
End: 2024-09-25

## 2024-09-25 NOTE — TELEPHONE ENCOUNTER
Patient advised. Patient said she wanted to be checked for bladder prolapse and is having urinary frequency and leakage.

## 2024-09-30 ENCOUNTER — TELEPHONE (OUTPATIENT)
Age: 89
End: 2024-09-30

## 2024-09-30 NOTE — TELEPHONE ENCOUNTER
Patient calling in, states she has concerns with being able to hold urine. Patient states she was advised by PCP to follow-up with GYN. Patient denies any other concerns at this time. Appointment scheduled for tomorrow for evaluation. Patient appreciative.

## 2024-10-01 ENCOUNTER — OFFICE VISIT (OUTPATIENT)
Dept: GYNECOLOGY | Facility: CLINIC | Age: 89
End: 2024-10-01
Payer: COMMERCIAL

## 2024-10-01 VITALS
DIASTOLIC BLOOD PRESSURE: 86 MMHG | SYSTOLIC BLOOD PRESSURE: 144 MMHG | WEIGHT: 156 LBS | HEIGHT: 63 IN | BODY MASS INDEX: 27.64 KG/M2

## 2024-10-01 DIAGNOSIS — N39.3 URINARY, INCONTINENCE, STRESS FEMALE: Primary | ICD-10-CM

## 2024-10-01 DIAGNOSIS — Z78.0 POSTMENOPAUSAL: ICD-10-CM

## 2024-10-01 PROCEDURE — 99213 OFFICE O/P EST LOW 20 MIN: CPT | Performed by: PHYSICIAN ASSISTANT

## 2024-10-15 NOTE — PROGRESS NOTES
Assessment/Plan:      Diagnoses and all orders for this visit:    Urinary, incontinence, stress female    Postmenopausal          Subjective:     Patient ID: Luba Barajas is a 89 y.o. female.    Pt presents for a problem today  She complains of urinary urgency, frequency and leaking with stress  No back pain  No fever, chills  No burning with urination  Was taking meds for OAB but has stopped due to side effects  Wanted to be sure no major prolapse    Declines ua  Yuliya discussed  Pfpt discussed in detail        Review of Systems   Constitutional:  Negative for chills, fever and unexpected weight change.   HENT:  Negative for ear pain and sore throat.    Eyes:  Negative for pain and visual disturbance.   Respiratory:  Negative for cough and shortness of breath.    Cardiovascular:  Negative for chest pain and palpitations.   Gastrointestinal:  Negative for abdominal pain, blood in stool, constipation, diarrhea and vomiting.   Genitourinary:  Positive for urgency. Negative for dysuria and hematuria.   Musculoskeletal:  Negative for arthralgias and back pain.   Skin:  Negative for color change and rash.   Neurological:  Negative for seizures and syncope.   All other systems reviewed and are negative.        Objective:     Physical Exam  Vitals and nursing note reviewed.   Constitutional:       Appearance: Normal appearance. She is well-developed.   Genitourinary:     General: Normal vulva.      Exam position: Supine.      Labia:         Right: No rash or lesion.         Left: No rash or lesion.       Vagina: Normal.      Cervix: No cervical motion tenderness, discharge or friability.      Uterus: Normal.       Adnexa: Right adnexa normal and left adnexa normal.   Lymphadenopathy:      Lower Body: No right inguinal adenopathy. No left inguinal adenopathy.   Neurological:      Mental Status: She is alert.

## 2024-11-26 ENCOUNTER — HOSPITAL ENCOUNTER (INPATIENT)
Facility: HOSPITAL | Age: 89
LOS: 27 days | Discharge: NURSING, REHAB OR LONG-TERM CARE | DRG: 885 | End: 2024-12-23
Attending: PSYCHIATRY & NEUROLOGY | Admitting: PSYCHIATRY & NEUROLOGY
Payer: COMMERCIAL

## 2024-11-26 ENCOUNTER — APPOINTMENT (EMERGENCY)
Dept: RADIOLOGY | Facility: HOSPITAL | Age: 89
End: 2024-11-26
Payer: COMMERCIAL

## 2024-11-26 ENCOUNTER — HOSPITAL ENCOUNTER (EMERGENCY)
Facility: HOSPITAL | Age: 89
End: 2024-11-26
Attending: EMERGENCY MEDICINE
Payer: COMMERCIAL

## 2024-11-26 VITALS
WEIGHT: 156.97 LBS | DIASTOLIC BLOOD PRESSURE: 63 MMHG | BODY MASS INDEX: 28.89 KG/M2 | HEIGHT: 62 IN | OXYGEN SATURATION: 95 % | TEMPERATURE: 98 F | HEART RATE: 89 BPM | SYSTOLIC BLOOD PRESSURE: 140 MMHG | RESPIRATION RATE: 20 BRPM

## 2024-11-26 DIAGNOSIS — F41.9 ANXIETY: Primary | ICD-10-CM

## 2024-11-26 DIAGNOSIS — I10 HTN (HYPERTENSION): ICD-10-CM

## 2024-11-26 DIAGNOSIS — L30.4 INTERTRIGO: ICD-10-CM

## 2024-11-26 DIAGNOSIS — K59.00 CONSTIPATION: ICD-10-CM

## 2024-11-26 DIAGNOSIS — E53.8 VITAMIN B12 DEFICIENCY: ICD-10-CM

## 2024-11-26 DIAGNOSIS — G31.84 MILD NEUROCOGNITIVE DISORDER: ICD-10-CM

## 2024-11-26 DIAGNOSIS — E55.9 VITAMIN D DEFICIENCY: ICD-10-CM

## 2024-11-26 DIAGNOSIS — E78.5 HLD (HYPERLIPIDEMIA): ICD-10-CM

## 2024-11-26 DIAGNOSIS — F32.9 MAJOR DEPRESSIVE DISORDER, SINGLE EPISODE, UNSPECIFIED: Primary | ICD-10-CM

## 2024-11-26 DIAGNOSIS — F41.9 ANXIETY: ICD-10-CM

## 2024-11-26 LAB
ALBUMIN SERPL BCG-MCNC: 3.8 G/DL (ref 3.5–5)
ALP SERPL-CCNC: 67 U/L (ref 34–104)
ALT SERPL W P-5'-P-CCNC: 9 U/L (ref 7–52)
AMPHETAMINES SERPL QL SCN: NEGATIVE
ANION GAP SERPL CALCULATED.3IONS-SCNC: 6 MMOL/L (ref 4–13)
AST SERPL W P-5'-P-CCNC: 12 U/L (ref 13–39)
BACTERIA UR QL AUTO: ABNORMAL /HPF
BARBITURATES UR QL: NEGATIVE
BASOPHILS # BLD AUTO: 0.06 THOUSANDS/ΜL (ref 0–0.1)
BASOPHILS NFR BLD AUTO: 1 % (ref 0–1)
BENZODIAZ UR QL: NEGATIVE
BILIRUB SERPL-MCNC: 0.63 MG/DL (ref 0.2–1)
BILIRUB UR QL STRIP: NEGATIVE
BUN SERPL-MCNC: 17 MG/DL (ref 5–25)
CALCIUM SERPL-MCNC: 9.5 MG/DL (ref 8.4–10.2)
CHLORIDE SERPL-SCNC: 105 MMOL/L (ref 96–108)
CLARITY UR: CLEAR
CO2 SERPL-SCNC: 29 MMOL/L (ref 21–32)
COCAINE UR QL: NEGATIVE
COLOR UR: ABNORMAL
CREAT SERPL-MCNC: 0.79 MG/DL (ref 0.6–1.3)
EOSINOPHIL # BLD AUTO: 0.05 THOUSAND/ΜL (ref 0–0.61)
EOSINOPHIL NFR BLD AUTO: 1 % (ref 0–6)
ERYTHROCYTE [DISTWIDTH] IN BLOOD BY AUTOMATED COUNT: 14.4 % (ref 11.6–15.1)
ETHANOL EXG-MCNC: 0 MG/DL
FENTANYL UR QL SCN: NEGATIVE
FLUAV AG UPPER RESP QL IA.RAPID: NEGATIVE
FLUBV AG UPPER RESP QL IA.RAPID: NEGATIVE
GFR SERPL CREATININE-BSD FRML MDRD: 67 ML/MIN/1.73SQ M
GLUCOSE SERPL-MCNC: 109 MG/DL (ref 65–140)
GLUCOSE UR STRIP-MCNC: NEGATIVE MG/DL
HCT VFR BLD AUTO: 35 % (ref 34.8–46.1)
HGB BLD-MCNC: 11.7 G/DL (ref 11.5–15.4)
HGB UR QL STRIP.AUTO: ABNORMAL
HYDROCODONE UR QL SCN: NEGATIVE
IMM GRANULOCYTES # BLD AUTO: 0.02 THOUSAND/UL (ref 0–0.2)
IMM GRANULOCYTES NFR BLD AUTO: 0 % (ref 0–2)
KETONES UR STRIP-MCNC: NEGATIVE MG/DL
LEUKOCYTE ESTERASE UR QL STRIP: NEGATIVE
LYMPHOCYTES # BLD AUTO: 0.5 THOUSANDS/ΜL (ref 0.6–4.47)
LYMPHOCYTES NFR BLD AUTO: 7 % (ref 14–44)
MCH RBC QN AUTO: 29.5 PG (ref 26.8–34.3)
MCHC RBC AUTO-ENTMCNC: 33.4 G/DL (ref 31.4–37.4)
MCV RBC AUTO: 88 FL (ref 82–98)
METHADONE UR QL: NEGATIVE
MONOCYTES # BLD AUTO: 0.55 THOUSAND/ΜL (ref 0.17–1.22)
MONOCYTES NFR BLD AUTO: 8 % (ref 4–12)
NEUTROPHILS # BLD AUTO: 6.11 THOUSANDS/ΜL (ref 1.85–7.62)
NEUTS SEG NFR BLD AUTO: 83 % (ref 43–75)
NITRITE UR QL STRIP: NEGATIVE
NON-SQ EPI CELLS URNS QL MICRO: ABNORMAL /HPF
NRBC BLD AUTO-RTO: 0 /100 WBCS
OPIATES UR QL SCN: NEGATIVE
OXYCODONE+OXYMORPHONE UR QL SCN: NEGATIVE
PCP UR QL: NEGATIVE
PH UR STRIP.AUTO: 7.5 [PH]
PLATELET # BLD AUTO: 242 THOUSANDS/UL (ref 149–390)
PMV BLD AUTO: 9.6 FL (ref 8.9–12.7)
POTASSIUM SERPL-SCNC: 4.3 MMOL/L (ref 3.5–5.3)
PROT SERPL-MCNC: 6.6 G/DL (ref 6.4–8.4)
PROT UR STRIP-MCNC: NEGATIVE MG/DL
RBC # BLD AUTO: 3.96 MILLION/UL (ref 3.81–5.12)
RBC #/AREA URNS AUTO: ABNORMAL /HPF
SARS-COV+SARS-COV-2 AG RESP QL IA.RAPID: NEGATIVE
SODIUM SERPL-SCNC: 140 MMOL/L (ref 135–147)
SP GR UR STRIP.AUTO: 1.02 (ref 1–1.03)
THC UR QL: NEGATIVE
TSH SERPL DL<=0.05 MIU/L-ACNC: 0.93 UIU/ML (ref 0.45–4.5)
UROBILINOGEN UR STRIP-ACNC: 2 MG/DL
WBC # BLD AUTO: 7.29 THOUSAND/UL (ref 4.31–10.16)
WBC #/AREA URNS AUTO: ABNORMAL /HPF

## 2024-11-26 PROCEDURE — 73060 X-RAY EXAM OF HUMERUS: CPT

## 2024-11-26 PROCEDURE — 87804 INFLUENZA ASSAY W/OPTIC: CPT

## 2024-11-26 PROCEDURE — 36415 COLL VENOUS BLD VENIPUNCTURE: CPT

## 2024-11-26 PROCEDURE — 80053 COMPREHEN METABOLIC PANEL: CPT

## 2024-11-26 PROCEDURE — 93005 ELECTROCARDIOGRAM TRACING: CPT

## 2024-11-26 PROCEDURE — 85025 COMPLETE CBC W/AUTO DIFF WBC: CPT

## 2024-11-26 PROCEDURE — 99285 EMERGENCY DEPT VISIT HI MDM: CPT | Performed by: EMERGENCY MEDICINE

## 2024-11-26 PROCEDURE — 80307 DRUG TEST PRSMV CHEM ANLYZR: CPT

## 2024-11-26 PROCEDURE — 84443 ASSAY THYROID STIM HORMONE: CPT

## 2024-11-26 PROCEDURE — 87811 SARS-COV-2 COVID19 W/OPTIC: CPT

## 2024-11-26 PROCEDURE — 82075 ASSAY OF BREATH ETHANOL: CPT

## 2024-11-26 PROCEDURE — 99284 EMERGENCY DEPT VISIT MOD MDM: CPT

## 2024-11-26 PROCEDURE — 81001 URINALYSIS AUTO W/SCOPE: CPT

## 2024-11-26 RX ORDER — RISPERIDONE 0.25 MG/1
0.25 TABLET ORAL
Status: DISCONTINUED | OUTPATIENT
Start: 2024-11-26 | End: 2024-12-23 | Stop reason: HOSPADM

## 2024-11-26 RX ORDER — HALOPERIDOL 5 MG/ML
5 INJECTION INTRAMUSCULAR
Status: CANCELLED | OUTPATIENT
Start: 2024-11-26

## 2024-11-26 RX ORDER — PROPRANOLOL HYDROCHLORIDE 10 MG/1
5 TABLET ORAL EVERY 8 HOURS PRN
Status: DISCONTINUED | OUTPATIENT
Start: 2024-11-26 | End: 2024-12-23 | Stop reason: HOSPADM

## 2024-11-26 RX ORDER — CLONAZEPAM 0.5 MG/1
0.5 TABLET ORAL DAILY PRN
COMMUNITY
End: 2024-12-23

## 2024-11-26 RX ORDER — ACETAMINOPHEN 325 MG/1
650 TABLET ORAL EVERY 4 HOURS PRN
Status: DISCONTINUED | OUTPATIENT
Start: 2024-11-26 | End: 2024-12-23 | Stop reason: HOSPADM

## 2024-11-26 RX ORDER — HYDROXYZINE HYDROCHLORIDE 50 MG/1
50 TABLET, FILM COATED ORAL
Status: DISCONTINUED | OUTPATIENT
Start: 2024-11-26 | End: 2024-12-23 | Stop reason: HOSPADM

## 2024-11-26 RX ORDER — RISPERIDONE 0.25 MG/1
0.5 TABLET ORAL
Status: CANCELLED | OUTPATIENT
Start: 2024-11-26

## 2024-11-26 RX ORDER — ACETAMINOPHEN 325 MG/1
975 TABLET ORAL EVERY 6 HOURS PRN
Status: DISCONTINUED | OUTPATIENT
Start: 2024-11-26 | End: 2024-12-23 | Stop reason: HOSPADM

## 2024-11-26 RX ORDER — HYDROXYZINE HYDROCHLORIDE 25 MG/1
50 TABLET, FILM COATED ORAL
Status: CANCELLED | OUTPATIENT
Start: 2024-11-26

## 2024-11-26 RX ORDER — TRAZODONE HYDROCHLORIDE 50 MG/1
50 TABLET, FILM COATED ORAL
Status: CANCELLED | OUTPATIENT
Start: 2024-11-26

## 2024-11-26 RX ORDER — ACETAMINOPHEN 325 MG/1
650 TABLET ORAL EVERY 4 HOURS PRN
Status: CANCELLED | OUTPATIENT
Start: 2024-11-26

## 2024-11-26 RX ORDER — MAGNESIUM HYDROXIDE/ALUMINUM HYDROXICE/SIMETHICONE 120; 1200; 1200 MG/30ML; MG/30ML; MG/30ML
30 SUSPENSION ORAL EVERY 4 HOURS PRN
Status: DISCONTINUED | OUTPATIENT
Start: 2024-11-26 | End: 2024-12-23 | Stop reason: HOSPADM

## 2024-11-26 RX ORDER — MAGNESIUM HYDROXIDE/ALUMINUM HYDROXICE/SIMETHICONE 120; 1200; 1200 MG/30ML; MG/30ML; MG/30ML
30 SUSPENSION ORAL EVERY 4 HOURS PRN
Status: CANCELLED | OUTPATIENT
Start: 2024-11-26

## 2024-11-26 RX ORDER — HYDROXYZINE HYDROCHLORIDE 25 MG/1
25 TABLET, FILM COATED ORAL
Status: DISCONTINUED | OUTPATIENT
Start: 2024-11-26 | End: 2024-12-23 | Stop reason: HOSPADM

## 2024-11-26 RX ORDER — CLONAZEPAM 0.5 MG/1
0.5 TABLET ORAL 3 TIMES DAILY PRN
Status: DISCONTINUED | OUTPATIENT
Start: 2024-11-26 | End: 2024-11-26 | Stop reason: HOSPADM

## 2024-11-26 RX ORDER — RISPERIDONE 1 MG/1
1 TABLET ORAL
Status: CANCELLED | OUTPATIENT
Start: 2024-11-26

## 2024-11-26 RX ORDER — HYDROXYZINE HYDROCHLORIDE 25 MG/1
25 TABLET, FILM COATED ORAL
Status: CANCELLED | OUTPATIENT
Start: 2024-11-26

## 2024-11-26 RX ORDER — LISINOPRIL 10 MG/1
10 TABLET ORAL DAILY
Status: DISCONTINUED | OUTPATIENT
Start: 2024-11-27 | End: 2024-11-26 | Stop reason: HOSPADM

## 2024-11-26 RX ORDER — ACETAMINOPHEN 325 MG/1
975 TABLET ORAL EVERY 6 HOURS PRN
Status: CANCELLED | OUTPATIENT
Start: 2024-11-26

## 2024-11-26 RX ORDER — TRAZODONE HYDROCHLORIDE 50 MG/1
50 TABLET, FILM COATED ORAL
Status: DISCONTINUED | OUTPATIENT
Start: 2024-11-26 | End: 2024-12-23 | Stop reason: HOSPADM

## 2024-11-26 RX ORDER — PRAVASTATIN SODIUM 40 MG
40 TABLET ORAL
Status: DISCONTINUED | OUTPATIENT
Start: 2024-11-26 | End: 2024-11-26 | Stop reason: HOSPADM

## 2024-11-26 RX ORDER — CLONAZEPAM 0.5 MG/1
0.5 TABLET ORAL ONCE
Status: COMPLETED | OUTPATIENT
Start: 2024-11-26 | End: 2024-11-26

## 2024-11-26 RX ORDER — RISPERIDONE 0.25 MG/1
0.25 TABLET ORAL
Status: CANCELLED | OUTPATIENT
Start: 2024-11-26

## 2024-11-26 RX ORDER — PROPRANOLOL HYDROCHLORIDE 10 MG/1
5 TABLET ORAL EVERY 8 HOURS PRN
Status: CANCELLED | OUTPATIENT
Start: 2024-11-26

## 2024-11-26 RX ORDER — RISPERIDONE 0.5 MG/1
0.5 TABLET ORAL
Status: DISCONTINUED | OUTPATIENT
Start: 2024-11-26 | End: 2024-12-23 | Stop reason: HOSPADM

## 2024-11-26 RX ORDER — HALOPERIDOL 5 MG/ML
5 INJECTION INTRAMUSCULAR
Status: DISCONTINUED | OUTPATIENT
Start: 2024-11-26 | End: 2024-12-23 | Stop reason: HOSPADM

## 2024-11-26 RX ORDER — NYSTATIN 100000 [USP'U]/G
POWDER TOPICAL 2 TIMES DAILY
Status: DISCONTINUED | OUTPATIENT
Start: 2024-11-26 | End: 2024-11-26 | Stop reason: HOSPADM

## 2024-11-26 RX ORDER — CLONAZEPAM 0.5 MG/1
1 TABLET ORAL 2 TIMES DAILY
Status: DISCONTINUED | OUTPATIENT
Start: 2024-11-26 | End: 2024-11-26 | Stop reason: HOSPADM

## 2024-11-26 RX ORDER — RISPERIDONE 1 MG/1
1 TABLET ORAL
Status: DISCONTINUED | OUTPATIENT
Start: 2024-11-26 | End: 2024-12-23 | Stop reason: HOSPADM

## 2024-11-26 RX ADMIN — CLONAZEPAM 1 MG: 0.5 TABLET ORAL at 20:26

## 2024-11-26 RX ADMIN — CLONAZEPAM 0.5 MG: 0.5 TABLET ORAL at 16:13

## 2024-11-26 RX ADMIN — NYSTATIN: 100000 POWDER TOPICAL at 20:14

## 2024-11-26 RX ADMIN — TRAZODONE HYDROCHLORIDE 50 MG: 50 TABLET ORAL at 23:10

## 2024-11-26 RX ADMIN — Medication 2000 UNITS: at 16:13

## 2024-11-26 RX ADMIN — PRAVASTATIN SODIUM 40 MG: 40 TABLET ORAL at 17:30

## 2024-11-26 NOTE — LETTER
ID # [id number]    [facility] # [id number]    UR Phone # [phone]    DISCHARGE SUMMARY

## 2024-11-26 NOTE — EMTALA/ACUTE CARE TRANSFER
[facility]LI EMERGENCY DEPARTMENT   [facility]  [place] [address]  Dept: [phone]      EMTALA TRANSFER CONSENT    NAME Luba Barajas                                         1935                              MRN [id number]    I have been informed of my rights regarding examination, treatment, and transfer   by Dr. Dorcas Long*    Benefits: Specialized equipment and/or services available at the receiving facility (Include comment)________________________, Continuity of care, Other benefits (Include comment)_______________________ (inpatient mental health 201)    Risks: Potential for delay in receiving treatment, Potential deterioration of medical condition, Possible worsening of condition or death during transfer, Increased discomfort during transfer      Transfer Request   I acknowledge that my medical condition has been evaluated and explained to me by the emergency department physician or other qualified medical person and/or my attending physician who has recommended and offered to me further medical examination and treatment. I understand the Hospital's obligation with respect to the treatment and stabilization of my emergency medical condition. I nevertheless request to be transferred. I release the Hospital, the doctor, and any other persons caring for me from all responsibility or liability for any injury or ill effects that may result from my transfer and agree to accept all responsibility for the consequences of my choice to transfer, rather than receive stabilizing treatment at the Hospital. I understand that because the transfer is my request, my insurance may not provide reimbursement for the services.  The Hospital will assist and direct me and my family in how to make arrangements for transfer, but the hospital is not liable for any fees charged by the transport service.  In spite of this understanding, I refuse to consent to further medical  examination and treatment which has been offered to me, and request transfer to Accepting Facility Name, City & State : [facility]. I authorize the performance of emergency medical procedures and treatments upon me in both transit and upon arrival at the receiving facility.  Additionally, I authorize the release of any and all medical records to the receiving facility and request they be transported with me, if possible.    I authorize the performance of emergency medical procedures and treatments upon me in both transit and upon arrival at the receiving facility.  Additionally, I authorize the release of any and all medical records to the receiving facility and request they be transported with me, if possible.  I understand that the safest mode of transportation during a medical emergency is an ambulance and that the Hospital advocates the use of this mode of transport. Risks of traveling to the receiving facility by car, including absence of medical control, life sustaining equipment, such as oxygen, and medical personnel has been explained to me and I fully understand them.    (SUSAN CORRECT BOX BELOW)  [  ]  I consent to the stated transfer and to be transported by ambulance/helicopter.  [  ]  I consent to the stated transfer, but refuse transportation by ambulance and accept full responsibility for my transportation by car.  I understand the risks of non-ambulance transfers and I exonerate the Hospital and its staff from any deterioration in my condition that results from this refusal.    X___________________________________________    DATE  24  TIME________  Signature of patient or legally responsible individual signing on patient behalf           RELATIONSHIP TO PATIENT_________________________          Provider Certification    NAME Luba Barajas                                         1935                              MRN [id number]    A medical screening exam was performed on the above named  patient.  Based on the examination:    Condition Necessitating Transfer The encounter diagnosis was Anxiety.    Patient Condition: The patient has been stabilized such that within reasonable medical probability, no material deterioration of the patient condition or the condition of the unborn child(makenzie) is likely to result from the transfer    Reason for Transfer: Level of Care needed not available at this facility, No bed available at level of patient's needs, Other (Include comment)____________________ ([facility] mental health [facility])    Transfer Requirements: Facility [facility]   Space available and qualified personnel available for treatment as acknowledged by    Agreed to accept transfer and to provide appropriate medical treatment as acknowledged by         Appropriate medical records of the examination and treatment of the patient are provided at the time of transfer   STAFF INITIAL WHEN COMPLETED _______  Transfer will be performed by qualified personnel from    and appropriate transfer equipment as required, including the use of necessary and appropriate life support measures.    Provider Certification: I have examined the patient and explained the following risks and benefits of being transferred/refusing transfer to the patient/family:  General risk, such as traffic hazards, adverse weather conditions, rough terrain or turbulence, possible failure of equipment (including vehicle or aircraft), or consequences of actions of persons outside the control of the transport personnel, Unanticipated needs of medical equipment and personnel during transport, Risk of worsening condition, The possibility of a transport vehicle being unavailable, The patient is stable for psychiatric transfer because they are medically stable, and is protected from harming him/herself or others during transport      Based on these reasonable risks and benefits to the patient and/or the unborn child(makenzie), and based upon the  information available at the time of the patient’s examination, I certify that the medical benefits reasonably to be expected from the provision of appropriate medical treatments at another medical facility outweigh the increasing risks, if any, to the individual’s medical condition, and in the case of labor to the unborn child, from effecting the transfer.    X____________________________________________ DATE 11/26/24        TIME_______      ORIGINAL - SEND TO MEDICAL RECORDS   COPY - SEND WITH PATIENT DURING TRANSFER

## 2024-11-26 NOTE — ED ATTENDING ATTESTATION
11/26/2024  IDorcas MD, saw and evaluated the patient. I have discussed the patient with the resident/non-physician practitioner and agree with the resident's/non-physician practitioner's findings, Plan of Care, and MDM as documented in the resident's/non-physician practitioner's note, except where noted. All available labs and Radiology studies were reviewed.  I was present for key portions of any procedure(s) performed by the resident/non-physician practitioner and I was immediately available to provide assistance.       At this point I agree with the current assessment done in the Emergency Department.  I have conducted an independent evaluation of this patient a history and physical is as follows:    89-year-old female with a history of anxiety and depression presenting with family for evaluation of anxiety.  Patient states her anxiety has acutely worsened over the last 2 months.  States she is worried all the time about many things and is unable to calm down.  Has been admitted to inpatient psychiatric care previously but it has been many years.  Patient recently increased her clonazepam which is not improving her anxiety.  Patient is endorsing right arm pain but denies falls or injury.  States she may have an overuse injury from pushing and pulling a chair.  Denies other acute physical complaints.  Denies suicidal and homicidal ideation and hallucinations.    Please see resident documentation for histories and review of systems.    Exam: Vital signs and nursing notes reviewed  General: Awake, alert, anxious  HEENT: Normocephalic, atraumatic, mucous membranes moist  Neck: Supple  Heart: Regular rate and rhythm, murmur present  Lungs: Clear to auscultation bilaterally without wheezes, rales, or rhonchi  Abdomen: Soft, nontender, nondistended  Extremities: No swelling or deformity, no bony tenderness to the right upper extremity, tenderness over the anterior right bicep that is worse with  movement at the elbow.  Intact range of motion of the right upper extremity  Skin: Warm, dry, intact, no rash or ecchymoses  Neuro: No gross motor deficits  Psych: Anxious    EKG: Reviewed by myself and the resident physician: Normal sinus rhythm with a left anterior fascicular block but no evidence of ischemia or malignant dysrhythmia    ED Course  ED Course as of 24   1424 XR humerus RIGHT  IMPRESSION:     No acute osseous abnormality.        1424 CBC and differential(!)  Left shift; otherwise normal   1430 Patient signed 201   1501 EXTBreath Alcohol: 0.000   1501 Comprehensive metabolic panel(!)  Grossly normal   1509 TSH 3RD GENERATON: 0.934   1509 Leukocytes, UA: Negative   1509 Nitrite, UA: Negative   1509 Blood, UA(!): Small   1520 Rapid drug screen, urine  negative   1520 Patient is medically appropriate for psychiatric disposition   1541 RBC Urine(!): 4-10   1541 WBC, UA: 1-2   1541 Epithelial Cells: Occasional   1541 Bacteria, UA: None Seen     ED course/Medical Decision Makin-year-old female presenting for evaluation of worsening anxiety.  Differential diagnosis includes electrolyte derangement, dehydration, urinary tract infection, dysfunction, worsening underlying anxiety.  No acute lethality concerns today.  EKG shows normal sinus rhythm without evidence of ischemia or malignant dysrhythmia.  CBC and CMP are grossly normal.  No evidence of urinary tract infection or thyroid dysfunction.  X-ray of the right humerus per my interpretation is negative for acute osseous abnormality, and radiology agrees.  Patient is medically appropriate for psychiatric evaluation.  Met with ED crisis and signed a 201 for voluntary inpatient psychiatric stabilization.  Patient has been accepted to [facility] older adult behavioral health unit for further management.  Patient and family are in agreement with this plan.    Diagnosis: Anxiety  Disposition: Transfer to behavioral  health

## 2024-11-26 NOTE — ED NOTES
Intake indicates expectation for review at [facility] once labs are resulted.  Crisis to outreach once all labs are collected / resulted and medical clearance is confirmed.  Transfer packet initiated, but not complete.

## 2024-11-26 NOTE — ED PROVIDER NOTES
Time reflects when diagnosis was documented in both MDM as applicable and the Disposition within this note       Time User Action Codes Description Comment    11/26/2024  3:20 PM AngyEulogio connerl Add [F41.9] Anxiety           ED Disposition       ED Disposition   Transfer to Behavioral Health Condition   --    Date/Time   Tue Nov 26, 2024  3:20 PM    Comment   Luba Barajas should be transferred out to behavioral health and has been medically cleared.               Assessment & Plan       Medical Decision Making  Ddx : anxiety, depression, thyroid dysfunction, UTI    UA without signs of infection  CBC, CMP, thyroid all grossly normal   Pt signed 201 with CRISIS and agreeable to inpatient psychiatric admission    Pt is medically cleared for psychiatric transfer and admission     Amount and/or Complexity of Data Reviewed  Independent Historian:      Details: Son and daughter in law  Labs: ordered. Decision-making details documented in ED Course.  Radiology: ordered. Decision-making details documented in ED Course.    Risk  OTC drugs.  Prescription drug management.  Decision regarding hospitalization.        ED Course as of 11/26/24 1910   Tue Nov 26, 2024   1432 XR humerus RIGHT  IMPRESSION:     No acute osseous abnormality.   1432 Hemoglobin: 11.7   1432 Pt signed 201 paperwork with CRISIS Evelyn.    1507 EXTBreath Alcohol: 0.000   1507 Comprehensive metabolic panel(!)  Grossly normal    1508 CBC and differential(!)  unremarkable   1520 UA w Reflex to Microscopic w Reflex to Culture(!)  No leukocytes, no nitrites, no sign of infection    1521 Rapid drug screen, urine   1521 TSH 3RD GENERATON: 0.934  WNL   1550 Bacteria, UA: None Seen   1550 SARS COV Rapid Antigen: Negative   1550 Influenza A Rapid Antigen: Negative   1550 Influenza B Rapid Antigen: Negative   1706 This patient is medically cleared for psychiatric admission and transfer to psychiatric facility       Medications   pravastatin (PRAVACHOL) tablet 40  mg (40 mg Oral Given 11/26/24 1730)   clonazePAM (KlonoPIN) tablet 1 mg (has no administration in time range)   clonazePAM (KlonoPIN) tablet 0.5 mg (has no administration in time range)   lisinopril (ZESTRIL) tablet 10 mg (has no administration in time range)   nystatin (MYCOSTATIN) powder (has no administration in time range)   PARoxetine (PAXIL) tablet 30 mg (has no administration in time range)   Cholecalciferol (VITAMIN D3) tablet 2,000 Units (2,000 Units Oral Given 11/26/24 1613)   menthol-zinc oxide (CALMOSEPTINE) 0.44-20.6 % ointment (has no administration in time range)   cyanocobalamin (VITAMIN B-12) tablet 1,000 mcg (has no administration in time range)   clonazePAM (KlonoPIN) tablet 0.5 mg (0.5 mg Oral Given 11/26/24 1613)       ED Risk Strat Scores                                               History of Present Illness       Chief Complaint   Patient presents with    Anxiety     Hx anxiety attacks/hospitalization; anxiety x 2 months worsening after medication change on Friday; denies CP/SOB/dizziness       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; and on nose    Colon polyp     Depression     HTN (hypertension)     HTN (hypertension)     Hyperlipidemia     Osteopenia     Skin cancer     Squamous cell on Left cheek      Past Surgical History:   Procedure Laterality Date    COLONOSCOPY      FOOT SURGERY      toe repair right 2nd toe    JOINT REPLACEMENT Bilateral     TKR    SQUAMOUS CELL CARCINOMA EXCISION Left     Lesion removed from posterior wrist     TOTAL KNEE ARTHROPLASTY        Family History   Problem Relation Age of Onset    Depression Mother     Hypertension Mother     Breast cancer Mother     Diabetes Mother     Lymphoma Father       Social History     Tobacco Use    Smoking status: Never    Smokeless tobacco: Never   Vaping Use    Vaping status: Never Used   Substance Use Topics    Alcohol use: No    Drug use: No       E-Cigarette/Vaping    E-Cigarette Use Never User       E-Cigarette/Vaping Substances      I have reviewed and agree with the history as documented.     The patient is an 89 year old female who presents to ED with her son and daughter in law for evaluation of anxiety. Pt states she is scared and worried all the time and nothing makes her feel better. Son states the pt has been previously admitted to psychiatric facilities for similar symptoms. Son states the pt is on medication for anxiety and depression but it is not helping. Pt reports she is afraid to be alone, she is afraid to eat, she is afraid to go anywhere. Son also noted patient's memory also seems to be declining. Pt is agreeable to going to inpatient psych. Pt denies chest pain, SOB, headache, recent injury or fall, dizziness,         Review of Systems   Constitutional:  Positive for activity change, appetite change and fatigue.   Respiratory:  Negative for cough and shortness of breath.    Cardiovascular:  Negative for chest pain.   Gastrointestinal:  Negative for abdominal pain.   Genitourinary:  Negative for difficulty urinating.   Skin:  Negative for color change.   Neurological:  Negative for dizziness and headaches.   Psychiatric/Behavioral:  Negative for self-injury and suicidal ideas. The patient is nervous/anxious.            Objective       ED Triage Vitals [11/26/24 1305]   Temperature Pulse Blood Pressure Respirations SpO2 Patient Position - Orthostatic VS   98 °F (36.7 °C) 89 140/63 20 95 % Sitting      Temp Source Heart Rate Source BP Location FiO2 (%) Pain Score    Oral Monitor Right arm -- No Pain      Vitals      Date and Time Temp Pulse SpO2 Resp BP Pain Score FACES Pain Rating User   11/26/24 1756 -- -- -- -- -- No Pain -- JV   11/26/24 1628 -- -- -- -- -- No Pain -- JV   11/26/24 1530 -- -- -- -- -- No Pain -- JV   11/26/24 1500 -- -- -- -- -- No Pain -- JV   11/26/24 1430 -- -- -- -- -- No Pain -- JV   11/26/24 1305 98 °F (36.7 °C)  89 95 % 20 140/63 No Pain -- ML            Physical Exam  Vitals and nursing note reviewed.   Constitutional:       Appearance: Normal appearance.   HENT:      Head: Normocephalic and atraumatic.      Nose: Nose normal.      Mouth/Throat:      Mouth: Mucous membranes are moist.      Pharynx: Oropharynx is clear.   Eyes:      Conjunctiva/sclera: Conjunctivae normal.      Pupils: Pupils are equal, round, and reactive to light.   Cardiovascular:      Rate and Rhythm: Normal rate and regular rhythm.      Heart sounds: Murmur heard.   Pulmonary:      Effort: Pulmonary effort is normal. No respiratory distress.      Breath sounds: Normal breath sounds. No stridor. No wheezing, rhonchi or rales.   Abdominal:      General: Abdomen is flat. Bowel sounds are normal.      Palpations: Abdomen is soft.      Tenderness: There is no abdominal tenderness. There is no right CVA tenderness, left CVA tenderness, guarding or rebound.   Musculoskeletal:         General: Tenderness present. No deformity. Normal range of motion.      Cervical back: Neck supple.      Right lower leg: No edema.      Left lower leg: No edema.      Comments: Mild tenderness to the right upper arm. Good RUE ROM and strength.    Skin:     General: Skin is warm and dry.   Neurological:      General: No focal deficit present.      Mental Status: She is alert and oriented to person, place, and time.   Psychiatric:         Attention and Perception: She does not perceive auditory or visual hallucinations.         Mood and Affect: Mood is anxious and depressed. Affect is tearful.         Speech: Speech normal.         Thought Content: Thought content does not include homicidal or suicidal ideation. Thought content does not include homicidal or suicidal plan.         Cognition and Memory: Memory is not impaired.         Results Reviewed       Procedure Component Value Units Date/Time    FLU/COVID Rapid Antigen (30 min. TAT) - Preferred screening test in ED [[id number]]   (Normal) Collected: 11/26/24 1409    Lab Status: Final result Specimen: Nares from Nose Updated: 11/26/24 1536     SARS COV Rapid Antigen Negative     Influenza A Rapid Antigen Negative     Influenza B Rapid Antigen Negative    Narrative:      This test has been performed using the [employer] Concha 2 FLU+SARS Antigen test under the Emergency Use Authorization (EUA). This test has been validated by the  and verified by the performing laboratory. The Concha uses lateral flow immunofluorescent sandwich assay to detect SARS-COV, Influenza A and Influenza B Antigen.     The [employer] Concha 2 SARS Antigen test does not differentiate between SARS-CoV and SARS-CoV-2.     Negative results are presumptive and may be confirmed with a molecular assay, if necessary, for patient management. Negative results do not rule out SARS-CoV-2 or influenza infection and should not be used as the sole basis for treatment or patient management decisions. A negative test result may occur if the level of antigen in a sample is below the limit of detection of this test.     Positive results are indicative of the presence of viral antigens, but do not rule out bacterial infection or co-infection with other viruses.     All test results should be used as an adjunct to clinical observations and other information available to the provider.    FOR PEDIATRIC PATIENTS - copy/paste COVID Guidelines URL to browser: [email or web address]    Urine Microscopic [[id number]]  (Abnormal) Collected: 11/26/24 1433    Lab Status: Final result Specimen: Urine, Clean Catch Updated: 11/26/24 1534     RBC, UA 4-10 /hpf      WBC, UA 1-2 /hpf      Epithelial Cells Occasional /hpf      Bacteria, UA None Seen /hpf     Rapid drug screen, urine [[id number]]  (Normal) Collected: 11/26/24 1433    Lab Status: Final result Specimen: Urine, Clean Catch Updated: 11/26/24 1517     Amph/Meth UR Negative     Barbiturate Ur  Negative     Benzodiazepine Urine Negative     Cocaine Urine Negative     Methadone Urine Negative     Opiate Urine Negative     PCP Ur Negative     THC Urine Negative     Oxycodone Urine Negative     Fentanyl Urine Negative     HYDROCODONE URINE Negative    Narrative:      FOR MEDICAL PURPOSES ONLY.   IF CONFIRMATION NEEDED PLEASE CONTACT THE LAB WITHIN 5 DAYS.    Drug Screen Cutoff Levels:  AMPHETAMINE/METHAMPHETAMINES  1000 ng/mL  BARBITURATES     200 ng/mL  BENZODIAZEPINES     200 ng/mL  COCAINE      300 ng/mL  METHADONE      300 ng/mL  OPIATES      300 ng/mL  PHENCYCLIDINE     25 ng/mL  THC       50 ng/mL  OXYCODONE      100 ng/mL  FENTANYL      5 ng/mL  HYDROCODONE     300 ng/mL    UA w Reflex to Microscopic w Reflex to Culture [[id number]]  (Abnormal) Collected: 11/26/24 1433    Lab Status: Final result Specimen: Urine, Clean Catch Updated: 11/26/24 1508     Color, UA Light Yellow     Clarity, UA Clear     Specific Gravity, UA 1.017     pH, UA 7.5     Leukocytes, UA Negative     Nitrite, UA Negative     Protein, UA Negative mg/dl      Glucose, UA Negative mg/dl      Ketones, UA Negative mg/dl      Urobilinogen, UA 2.0 mg/dl      Bilirubin, UA Negative     Occult Blood, UA Small    TSH, 3rd generation with Free T4 reflex [[id number]]  (Normal) Collected: 11/26/24 1409    Lab Status: Final result Specimen: Blood from Arm, Right Updated: 11/26/24 1508     TSH 3RD GENERATON 0.934 uIU/mL     Comprehensive metabolic panel [[id number]]  (Abnormal) Collected: 11/26/24 1409    Lab Status: Final result Specimen: Blood from Arm, Right Updated: 11/26/24 1454     Sodium 140 mmol/L      Potassium 4.3 mmol/L      Chloride 105 mmol/L      CO2 29 mmol/L      ANION GAP 6 mmol/L      BUN 17 mg/dL      Creatinine 0.79 mg/dL      Glucose 109 mg/dL      Calcium 9.5 mg/dL      AST 12 U/L      ALT 9 U/L      Alkaline Phosphatase 67 U/L      Total Protein 6.6 g/dL      Albumin 3.8 g/dL      Total Bilirubin 0.63 mg/dL      eGFR 67  ml/min/1.73sq m     Narrative:      National Kidney Disease Foundation guidelines for Chronic Kidney Disease (CKD):     Stage 1 with normal or high GFR (GFR > 90 mL/min/1.73 square meters)    Stage 2 Mild CKD (GFR = 60-89 mL/min/1.73 square meters)    Stage 3A Moderate CKD (GFR = 45-59 mL/min/1.73 square meters)    Stage 3B Moderate CKD (GFR = 30-44 mL/min/1.73 square meters)    Stage 4 Severe CKD (GFR = 15-29 mL/min/1.73 square meters)    Stage 5 End Stage CKD (GFR <15 mL/min/1.73 square meters)  Note: GFR calculation is accurate only with a steady state creatinine    POCT alcohol breath test [[id number]]  (Normal) Resulted: 11/26/24 1446    Lab Status: Final result Updated: 11/26/24 1446     EXTBreath Alcohol 0.000    CBC and differential [[id number]]  (Abnormal) Collected: 11/26/24 1409    Lab Status: Final result Specimen: Blood from Arm, Right Updated: 11/26/24 1416     WBC 7.29 Thousand/uL      RBC 3.96 Million/uL      Hemoglobin 11.7 g/dL      Hematocrit 35.0 %      MCV 88 fL      MCH 29.5 pg      MCHC 33.4 g/dL      RDW 14.4 %      MPV 9.6 fL      Platelets 242 Thousands/uL      nRBC 0 /100 WBCs      Segmented % 83 %      Immature Grans % 0 %      Lymphocytes % 7 %      Monocytes % 8 %      Eosinophils Relative 1 %      Basophils Relative 1 %      Absolute Neutrophils 6.11 Thousands/µL      Absolute Immature Grans 0.02 Thousand/uL      Absolute Lymphocytes 0.50 Thousands/µL      Absolute Monocytes 0.55 Thousand/µL      Eosinophils Absolute 0.05 Thousand/µL      Basophils Absolute 0.06 Thousands/µL             XR humerus RIGHT   Final Interpretation by Willis Loera MD (11/26 1413)      No acute osseous abnormality.         Computerized Assisted Algorithm (CAA) may have been used to analyze all applicable images.         Workstation performed: [id number]             ECG 12 Lead Documentation Only    Date/Time: 11/26/2024 2:05 PM    Performed by: Meggan España MD  Authorized by: Meggan España MD     Indications / Diagnosis:  Behavioral health admit  ECG reviewed by me, the ED Provider: yes    Patient location:  ED  Previous ECG:     Previous ECG:  Unavailable  Interpretation:     Interpretation: abnormal    Rate:     ECG rate:  81    ECG rate assessment: normal    Rhythm:     Rhythm: sinus rhythm    Ectopy:     Ectopy: none    QRS:     QRS axis:  Normal    QRS intervals:  Normal  Conduction:     Conduction: abnormal      Abnormal conduction: LAFB    ST segments:     ST segments:  Normal  T waves:     T waves: normal        ED Medication and Procedure Management   Prior to Admission Medications   Prescriptions Last Dose Informant Patient Reported? Taking?   Mirabegron ER (Myrbetriq) 25 MG TB24   Yes No   Sig: Take 25 mg by mouth daily   Patient not taking: Reported on 10/1/2024   PARoxetine (PAXIL) 20 mg tablet   Yes No   Sig: Take 20 mg by mouth daily   Probiotic Product (PROBIOTIC PO)   Yes No   Sig: Take by mouth   cholecalciferol (VITAMIN D3) 1,000 units tablet   Yes No   Sig: Take 1,000 Units by mouth daily   clonazePAM (KlonoPIN) 0.5 mg tablet   No No   Sig: Take 0.5 tablets (0.25 mg total) by mouth daily as needed for anxiety   Patient not taking: Reported on 9/9/2024   hydrochlorothiazide (MICROZIDE) 12.5 mg capsule   Yes No   Sig: Take 1 capsule by mouth daily   lisinopril (ZESTRIL) 10 mg tablet   Yes No   Sig: Take 1 tablet by mouth daily   oxybutynin (DITROPAN-XL) 10 MG 24 hr tablet   Yes No   simvastatin (ZOCOR) 20 mg tablet   Yes No   Sig: Take 20 mg by mouth daily at bedtime.      Facility-Administered Medications: None     Patient's Medications   Discharge Prescriptions    No medications on file     No discharge procedures on file.  ED SEPSIS DOCUMENTATION   Time reflects when diagnosis was documented in both MDM as applicable and the Disposition within this note       Time User Action Codes Description Comment    11/26/2024  3:20 PM Dorcas Travis Add [F41.9] Anxiety                  Meggan  Ely España MD  11/26/24 1910

## 2024-11-26 NOTE — ED NOTES
The patient is an 89-year-old female who arrived to the emergency department with family from her assisted living facility at [facility], where she has been living for the past 3 to 4 years.  Patient was in independent living, but given the sharp increase in her anxiety recently, she was transitioned to the assisted living portion of the program last week.  Family estimates that her anxiety has been progressively getting worse over the last 2 months.    Over the last several months the patient has been seen at [facility] several times.  She has been there 3 times for emergency encounters related to her anxiety.  On the first 2 visits she was released home.  On the third visit she was admitted to [facility], which the patient and family indicate were a very negative and unproductive experience.  They have made efforts to out reach to her outpatient psychiatrist, Dr. Negron, but have been unsuccessful.  Her next appointment with him is scheduled for December 27.  They have been making efforts to arrange a therapist through [facility], but currently none has been assigned.  There is no additional case management or community supports outside of family.  The patient has been treated with Paxil at 30 mg.  She is also being treated with Klonopin with the recent increase in dosage.  There have been no other medication changes recently.    The patient reports and exhibits extreme and debilitating anxiety.  She can be completely fine 1 moment and then becomes very tearful, shaky, and repetitive, voicing a variety of unrealistic fears.  For instance, she is very fearful of falling while getting dressed, using the walker, or even lying in bed.  She appears to have a sense of learned helplessness where she relies heavily on others to assist her with even menial tasks.  She is nihilistic with her thinking and envisions the worst possible outcomes despite having no recent falls.  The patient began  utilizing a walker for ambulation recently due to her extreme anxiety about falling.  She becomes very easily overwhelmed with panic, becomes extremely tearful, and there is a notable increase in confusion and distractibility when anxious.      The patient denies any current or past suicidal ideas, plan, intent.  She denies any past attempts and denies self-injurious behavior.  She denies any current or past homicidal ideas, plan, or intent.  She denies any history of violence or aggression to others.  The patient does endorse depression with social withdrawal and isolation and tearfulness.  She also has a negative style of thinking to the point that it is nearly delusional with regard to her potential for falls and other dangers.  She is extremely fearful, but there appears to be no reason for this.  She denies any auditory or visual hallucinations and other than her nihilistic paranoia, there is no notable delusional thought.  The patient reports a decrease in appetite related to not liking the food at the facility.  Family indicates a slight increase in weight.  The patient reports that she has awful sleep but then describes how she sleeps from 10 PM until 4 AM and then naps throughout the day.    Patient contacted her family today expressing this recurrent anxiety and they opted to bring her to [facility] for evaluation and treatment due to the fact that there are multiple attempts to have her treated through [facility] have been unsuccessful.  They are requesting an in-network bed and do not want her considered for out of network placement at this time.  The patient is recurrently fearful, asking several times if people will be available to help her and if this is the right decision.  She expressed familiarity with inpatient treatment.  Rights and explanation of 72-hour notice were reviewed and provided.  Reassurance was offered repeatedly.  Family is very supportive and remained at bedside.   Referral was forwarded to intake for in-network placement consideration.

## 2024-11-26 NOTE — ED NOTES
Patient is accepted at [facility]  Patient is accepted by Dr. Paulette REDDY at Intake     Transportation is arranged with [facility].     Transportation is scheduled for 20:00 with [facility]  Patient may go to the floor upon arrival         Nurse report is to be called to [phone] prior to patient transfer.

## 2024-11-27 PROBLEM — F33.2 MDD (MAJOR DEPRESSIVE DISORDER), RECURRENT EPISODE, SEVERE (HCC): Status: ACTIVE | Noted: 2024-11-27

## 2024-11-27 LAB
ATRIAL RATE: 81 BPM
P AXIS: 60 DEGREES
PR INTERVAL: 180 MS
QRS AXIS: -47 DEGREES
QRSD INTERVAL: 82 MS
QT INTERVAL: 370 MS
QTC INTERVAL: 429 MS
T WAVE AXIS: 18 DEGREES
VENTRICULAR RATE: 81 BPM

## 2024-11-27 PROCEDURE — 90677 PCV20 VACCINE IM: CPT | Performed by: FAMILY MEDICINE

## 2024-11-27 PROCEDURE — 99223 1ST HOSP IP/OBS HIGH 75: CPT | Performed by: PSYCHIATRY & NEUROLOGY

## 2024-11-27 PROCEDURE — 93010 ELECTROCARDIOGRAM REPORT: CPT | Performed by: INTERNAL MEDICINE

## 2024-11-27 PROCEDURE — G0009 ADMIN PNEUMOCOCCAL VACCINE: HCPCS | Performed by: FAMILY MEDICINE

## 2024-11-27 RX ORDER — NYSTATIN 100000 [USP'U]/G
1 POWDER TOPICAL 2 TIMES DAILY
Status: ON HOLD | COMMUNITY
End: 2024-12-20

## 2024-11-27 RX ORDER — PAROXETINE 10 MG/1
10 TABLET, FILM COATED ORAL DAILY
Status: DISCONTINUED | OUTPATIENT
Start: 2024-11-27 | End: 2024-11-29

## 2024-11-27 RX ORDER — CLONAZEPAM 0.5 MG/1
0.25 TABLET ORAL 2 TIMES DAILY PRN
Status: DISCONTINUED | OUTPATIENT
Start: 2024-11-27 | End: 2024-12-09

## 2024-11-27 RX ORDER — NYSTATIN 100000 [USP'U]/G
POWDER TOPICAL 2 TIMES DAILY
Status: DISCONTINUED | OUTPATIENT
Start: 2024-11-27 | End: 2024-12-23 | Stop reason: HOSPADM

## 2024-11-27 RX ORDER — LISINOPRIL 10 MG/1
10 TABLET ORAL DAILY
Status: DISCONTINUED | OUTPATIENT
Start: 2024-11-27 | End: 2024-12-23 | Stop reason: HOSPADM

## 2024-11-27 RX ORDER — PRAVASTATIN SODIUM 40 MG
40 TABLET ORAL
Status: DISCONTINUED | OUTPATIENT
Start: 2024-11-27 | End: 2024-12-23 | Stop reason: HOSPADM

## 2024-11-27 RX ADMIN — LISINOPRIL 10 MG: 10 TABLET ORAL at 11:48

## 2024-11-27 RX ADMIN — Medication 2000 UNITS: at 11:48

## 2024-11-27 RX ADMIN — HYDROXYZINE HYDROCHLORIDE 25 MG: 25 TABLET ORAL at 11:49

## 2024-11-27 RX ADMIN — NYSTATIN: 100000 POWDER TOPICAL at 17:20

## 2024-11-27 RX ADMIN — PRAVASTATIN SODIUM 40 MG: 40 TABLET ORAL at 17:21

## 2024-11-27 RX ADMIN — ANORECTAL OINTMENT: 15.7; .44; 24; 20.6 OINTMENT TOPICAL at 17:21

## 2024-11-27 RX ADMIN — PROPRANOLOL HYDROCHLORIDE 5 MG: 10 TABLET ORAL at 00:52

## 2024-11-27 RX ADMIN — PNEUMOCOCCAL 20-VALENT CONJUGATE VACCINE 0.5 ML
2.2; 2.2; 2.2; 2.2; 2.2; 2.2; 2.2; 2.2; 2.2; 2.2; 2.2; 2.2; 2.2; 2.2; 2.2; 2.2; 4.4; 2.2; 2.2; 2.2 INJECTION, SUSPENSION INTRAMUSCULAR at 08:11

## 2024-11-27 RX ADMIN — CYANOCOBALAMIN TAB 500 MCG 1000 MCG: 500 TAB at 11:49

## 2024-11-27 RX ADMIN — PAROXETINE 10 MG: 10 TABLET, FILM COATED ORAL at 11:48

## 2024-11-27 NOTE — NURSING NOTE
Patient adjusting to unit. Remains very anxious and restless during shift. Incontinent of urine twice overnight.  Bed Alarm engaged for patient safety. Walker and Tap Guan at bedside. Insomnia observed during q 15 minute safety checks. Will continue to monitor.

## 2024-11-27 NOTE — H&P
Psychiatric Evaluation - Behavioral Health   Name: Luba Barajas 89 y.o. female I MRN: [id number]  Unit/Bed#: [facility] 201-02 I Date of Admission: 11/26/2024   Date of Service: 11/27/2024 I Hospital Day: 1    Assessment & Plan  Generalized anxiety disorder    HTN (hypertension)    MDD (major depressive disorder), recurrent episode, severe (HCC)      Planned Treatment and Medication Changes: All current active medications have been reviewed  Encourage group therapy, milieu therapy and occupational therapy  Behavioral Health checks for safety monitoring  Medical follow up with Dr. Bailon/Sharon PERALTA  Paxil 10 mg po daily.   Klonopin 0.25 mg bid prn for severe anxiety.  As per PDMP, pt was prescribed Klonopin 0.5 mg po daily prn for anxiety.   Trazodone 50 mg po hs prn for insomnia.   Monitor fall risk.    Current Facility-Administered Medications   Medication Dose Route Frequency Provider Last Rate    acetaminophen  650 mg Oral Q4H PRN Myrna Caldwell MD      acetaminophen  650 mg Oral Q4H PRN Myrna Caldwell MD      acetaminophen  975 mg Oral Q6H PRN Myrna Caldwell MD      aluminum-magnesium hydroxide-simethicone  30 mL Oral Q4H PRN Myrna Caldwell MD      haloperidol lactate  5 mg Intramuscular Q4H PRN Max 4/day Myrna Caldwell MD      hydrOXYzine HCL  25 mg Oral Q6H PRN Max 4/day Myrna Caldwell MD      hydrOXYzine HCL  50 mg Oral Q6H PRN Max 4/day Myrna Caldwell MD      melatonin  3 mg Oral HS Myrna Caldwell MD      nicotine polacrilex  4 mg Oral Q2H PRN Myrna Caldwell MD      propranolol  5 mg Oral Q8H PRN Myrna Caldwell MD      risperiDONE  0.25 mg Oral Q4H PRN Max 6/day Myrna Caldwell MD      risperiDONE  0.5 mg Oral Q4H PRN Max 3/day Myrna Caldwell MD      risperiDONE  1 mg Oral Q2H PRN Max 3/day Myrna Caldwell MD      traZODone  50 mg Oral Q6H PRN Max 3/day Myrna Caldwell MD       Risks / Benefits of Treatment:    Risks, benefits, and possible side effects of medications  explained to patient and patient verbalizes understanding and agreement for treatment.      Chief Complaint: depression, anxiety, and unstable mood    History of Present Illness       Luba Barajas is a 89 y.o. female with a history of depression, anxiety, and Panic Disorder who was admitted to the inpatient psychiatric unit on a voluntary 201 commitment basis due to depression, anxiety, and unstable mood. Patient was brought to ED by family member from assisted living due to significant worsening of mood, anxiety panic-like attack, tearfulness for the past 2 months.  Symptoms prior to admission included depression, hopelessness, poor concentration, poor appetite, anxiety attacks, and memory difficulties. Onset of symptoms was gradual starting few months ago with progressively worsening course since that time. Stressors preceding admission included health issues, medical problems, chronic pain, chronic anxiety, and ongoing anxiety.   On initial evaluation after admission to the inpatient psychiatric unit uLba presents anxious, depressed but able to answer question in goal directed way.  She reports feeling very anxious, tearful, helpless at time and ruminating about not feeling safe being by herself.  She also admits difficulty adjusting to recent transition from independent living to assisted living. She denies any suicidal ideation or wish to die but rather experiencing difficulty with her multiple medical conditions, chronic pain and high anxiety. She reports she has not been able to ambulate like she used to due to panic attack and weakness in her lower legs.  Patient reports she was hospitalized several times in behavioral health unit and  as per record she was hospitalized at [facility] most recently after multiple presentation in the ED. She has been prescribed Klonopin and Paxil for several years. Denies any alcohol use or illicit drug use. Patient agreed to be compliant with medication and treatment  plan. No acute focal neurological deficit or change in mental status noted for examination.    Psychiatric Review Of Systems:    Sleep changes: decreased  Appetite changes: decreased  Weight changes: no  Energy/anergy: decreased  Interest/pleasure/anhedonia: yes  Somatic symptoms: yes  Anxiety/panic: yes  Keely: no  Guilty/hopeless: no  Self injurious behavior/risky behavior: not recently  Suicidal ideation: no  Homicidal ideation: no  Auditory hallucinations: no  Visual hallucinations: no  Other hallucinations: no  Delusional thinking: no  Eating disorder history: no  Obsessive/compulsive symptoms: no    Historical Information       Past Psychiatric History:     Past Inpatient Psychiatric Treatment:   Several past inpatient psychiatric admissions  Past Outpatient Psychiatric Treatment:    Not in outpatient treatment recently  Past Suicide Attempts: no  Past Violent Behavior: no  Past Psychiatric Medication Trials: Klonopin, Paxil     Substance Abuse History:    Social History       Tobacco History       Smoking Status  Never      Smokeless Tobacco Use  Never              Alcohol History       Alcohol Use Status  No              Drug Use       Drug Use Status  No              Sexual Activity       Sexually Active  Yes Partners  Male              Other Factors    Not Asked                 Additional Substance Use Detail       Questions Responses    Substance Use Assessment Denies substance use within the past 12 months    Alcohol Use Frequency Denies use in past 12 months    Heroin Frequency Denies use in past 12 months    Methamphetamine Frequency Denies use in past 12 months    Crack Cocaine Method Other    Crack Cocaine 1st Use denies use    Narcotic Frequency Denies use in past 12 months    Benzodiazepine Frequency Denies use in past 12 months    Amphetamine frequency Denies use in past 12 months    Barbituate Frequency Denies use use in past 12 months    Last reviewed by Dano Hanson RN on 11/27/2024           I have assessed this patient for substance use within the past 12 months    Alcohol use: denies current use  Recreational drug use: denies current use     Family Psychiatric History:     Mother with h/o anxiety.    Social History:    Education: associate degree, RN  Learning Disabilities: none  Marital History:   Children: 4 adult sons, one son passed away due to MVA.  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    Allergies:    Allergies   Allergen Reactions    Melatonin Hallucinations    Codeine     Demerol [Meperidine]     Sulfa Antibiotics     Ultram [Tramadol]        Medications:     All current active medications have been reviewed.    OBJECTIVE:    Vital signs in last 24 hours:    Temp:  [97.2 °F (36.2 °C)-98 °F (36.7 °C)] 97.2 °F (36.2 °C)  HR:  [74-89] 74  BP: (127-140)/(60-63) 127/60  Resp:  [18-20] 18  SpO2:  [95 %-96 %] 96 %  O2 Device: None (Room air)    No intake or output data in the 24 hours  ending 11/27/24 0513     Mental Status Evaluation:    Appearance:  age appropriate   Behavior:  cooperative, restless   Speech:  normal rate and volume   Mood:  depressed, anxious   Affect:  constricted   Language: naming objects   Thought Process:  goal directed   Associations: intact associations   Thought Content:  no overt delusions   Perceptual Disturbances: none   Risk Potential: Suicidal ideation - None at present  Homicidal ideation - None  Potential for aggression - No   Sensorium:  oriented to person and place   Memory:  recent and remote memory grossly intact   Consciousness:  alert and awake   Attention/Concentration: attention span and concentration are age appropriate   Intellect: average   Fund of Knowledge: awareness of current events: limited   Insight:  limited   Judgment: fair   Muscle Strength:   Muscle Tone:   not examined   Gait/Station: slow gait, also uses walker   Motor Activity: no abnormal movements         Laboratory Results: I have personally reviewed all pertinent laboratory/tests results    Most Recent Labs:   Lab Results   Component Value Date    WBC 7.29 11/26/2024    RBC 3.96 11/26/2024    HGB 11.7 11/26/2024    HCT 35.0 11/26/2024     11/26/2024    RDW 14.4 11/26/2024    NEUTROABS 6.11 11/26/2024    SODIUM 140 11/26/2024    K 4.3 11/26/2024     11/26/2024    CO2 29 11/26/2024    BUN 17 11/26/2024    CREATININE 0.79 11/26/2024    GLUC 109 11/26/2024    CALCIUM 9.5 11/26/2024    AST 12 (L) 11/26/2024    ALT 9 11/26/2024    ALKPHOS 67 11/26/2024    TP 6.6 11/26/2024    ALB 3.8 11/26/2024    TBILI 0.63 11/26/2024    CHOLESTEROL 154 03/11/2016    HDL 75 (H) 03/11/2016    TRIG 61 03/11/2016    LDLCALC 67 03/11/2016    OXN5SIRTSOFQ 0.934 11/26/2024       Imaging Studies: XR humerus RIGHT  Result Date: 11/26/2024  Narrative: XR HUMERUS RIGHT INDICATION: upper arm pain after moving chair. COMPARISON: None FINDINGS: No acute fracture or dislocation. Mild osteoarthrosis of the  acromioclavicular joint. No lytic or blastic osseous lesion. Unremarkable soft tissues.     Impression: No acute osseous abnormality. Computerized Assisted Algorithm (CAA) may have been used to analyze all applicable images. Workstation performed: [id number]     CT head wo contrast  Result Date: 11/3/2024  Narrative: Clinical information: Confusion Technique: Unenhanced CT scan of the brain was performed by department technique. Images were reviewed in soft tissue and bone algorithms with 2.5 mm axial sections, sagittal and coronal reformations. Comparison: MRI of the brain without contrast 10/16/2024. Findings Brain Parenchyma: Patchy and confluent hypoattenuation in the periventricular white matter, nonspecific though likely sequelae of microangiopathy. No acute large territorial infarction. Ventricular system, basal cisterns and extra-axial spaces: Ventricles and sulci are prominent, compatible with parenchymal volume loss. Intracranial hemorrhage: None. Midline shift: None. Skull base & Calvarium: Acute osseous abnormality. Vascular calcifications at the skull base. Paranasal sinuses and mastoid air cells: Clear. Visualized orbits: Left lens replacement. Sella: Normal.    Impression: Impression: No acute intracranial hemorrhage or territorial infarction. MRI is more sensitive for detection of acute ischemia and can be considered as clinically indicated. Workstation:[id number]      Code Status: Prior  Advance Directive and Living Will: <no information>    Suicide/Homicide Risk Assessment:    Risk of Harm to Self:   Nursing Suicide Risk Assessment Last 24 hours: C-SSRS Risk (Since Last Contact)  Calculated C-SSRS Risk Score (Since Last Contact): No Risk Indicated    Risk of Harm to Others:  Nursing Homicide Risk Assessment: Violence Risk to Others: Denies within past 6 months    The following interventions are recommended: Behavioral Health checks for safety monitoring     Patient Strengths: cooperative, negotiates  basic needs, patient is on a voluntary commitment     Patient Barriers: chronic pain, medical problems, poor physical health    Counseling / Coordination of Care:    Patient's presentation on admission and proposed treatment plan discussed with treatment team.  Diagnosis, medication changes and treatment plan reviewed with patient.  Events leading to admission reviewed with patient.    Inpatient Psychiatric Certification:    Estimated length of stay: 7 midnights    Based upon physical, mental and social evaluations, I certify that inpatient psychiatric services are medically necessary for this patient for a duration of 7 midnights for the treatment of MDD (major depressive disorder), recurrent episode, severe (HCC)      Renard Keyes MD 11/27/24

## 2024-11-27 NOTE — NURSING NOTE
propranolol (INDERAL) tablet 5 mg given PO at 0052 for restlessness. Mildly effective at present. Incontinent of urine. Assist of 1 to change and karime-care.

## 2024-11-27 NOTE — H&P
Luba Barajas#  :1935 F  MRN:[id number]    CSN:[id number]  Adm Date: 2024  9:49 PM   ATT PHY: Renard Keyes [facility]         Chief Complaint: depression, anxiety      History of Presenting Illness: Luba Barajas is a(n) 89 y.o. year old female who is admitted to [facility] on voluntary 201 commitment basis.  Patient originally presented to [facility] ED on 24 for anxiety and depression.    Patient examined at bedside.  Patient states she had urinary frequency overnight which is chronic for her.  Symptoms are better this morning.  Also reports generalized weakness at baseline but denies worsening.  She is agreeable to work with PT OT while on unit.  Lastly complains of difficulty swallowing.  Denies any other complaints at this time.    Allergies   Allergen Reactions    Melatonin Hallucinations    Codeine     Demerol [Meperidine]     Sulfa Antibiotics     Ultram [Tramadol]      Current Facility-Administered Medications on File Prior to Encounter   Medication Dose Route Frequency Provider Last Rate Last Admin    [COMPLETED] clonazePAM (KlonoPIN) tablet 0.5 mg  0.5 mg Oral Once Meggan España MD   0.5 mg at 24    [DISCONTINUED] Cholecalciferol (VITAMIN D3) tablet 2,000 Units  2,000 Units Oral Daily Meggan España MD   2,000 Units at 24    [DISCONTINUED] clonazePAM (KlonoPIN) tablet 0.5 mg  0.5 mg Oral TID PRN Meggan España MD        [DISCONTINUED] clonazePAM (KlonoPIN) tablet 1 mg  1 mg Oral BID Meggan España MD   1 mg at 24    [DISCONTINUED] cyanocobalamin (VITAMIN B-12) tablet 1,000 mcg  1,000 mcg Oral Daily Meggan España MD        [DISCONTINUED] lisinopril (ZESTRIL) tablet 10 mg  10 mg Oral Daily Meggan España MD        [DISCONTINUED] menthol-zinc oxide (CALMOSEPTINE) 0.44-20.6 % ointment   Topical Daily Before Breakfast [facility]  Ely España MD        [DISCONTINUED] nystatin (MYCOSTATIN) powder   Topical BID [facility] Ely España MD   Given at 11/26/24 2014    [DISCONTINUED] PARoxetine (PAXIL) tablet 30 mg  30 mg Oral HS [facility] Ely España MD        [DISCONTINUED] pravastatin (PRAVACHOL) tablet 40 mg  40 mg Oral Daily With Dinner [facility] Ely España MD   40 mg at 11/26/24 1730     Current Outpatient Medications on File Prior to Encounter   Medication Sig Dispense Refill    cholecalciferol (VITAMIN D3) 1,000 units tablet Take 1,000 Units by mouth daily      clonazePAM (KlonoPIN) 0.5 mg tablet Take 0.5 tablets (0.25 mg total) by mouth daily as needed for anxiety (Patient taking differently: Take 1 mg by mouth 2 (two) times a day 1 mg in am  1 mg at HS) 15 tablet 1    clonazePAM (KlonoPIN) 0.5 mg tablet Take 0.5 mg by mouth daily as needed for anxiety (taken in afternoon PRN for anxiety.)      Cyanocobalamin 1000 MCG CAPS Take 1,000 mcg by mouth daily      lisinopril (ZESTRIL) 10 mg tablet Take 1 tablet by mouth daily      nystatin (MYCOSTATIN) powder Apply 1 Application topically 2 (two) times a day      PARoxetine (PAXIL) 20 mg tablet Take 30 mg by mouth daily at bedtime      simvastatin (ZOCOR) 20 mg tablet Take 20 mg by mouth daily at bedtime.      hydrochlorothiazide (MICROZIDE) 12.5 mg capsule Take 1 capsule by mouth daily (Patient not taking: Reported on 11/26/2024)      Mirabegron ER (Myrbetriq) 25 MG TB24 Take 25 mg by mouth daily (Patient not taking: Reported on 10/1/2024)      oxybutynin (DITROPAN-XL) 10 MG 24 hr tablet  (Patient not taking: Reported on 11/26/2024)      Probiotic Product (PROBIOTIC PO) Take by mouth (Patient not taking: Reported on 11/26/2024)       Active Ambulatory Problems     Diagnosis Date Noted    Major depression in remission (HCC) 03/11/2016    Generalized anxiety disorder 03/11/2016    Squamous cell carcinoma 01/04/2019    Vitamin D deficiency 03/12/2019    Hx of adenomatous colonic polyps 04/06/2021    HTN  (hypertension) 2021     Resolved Ambulatory Problems     Diagnosis Date Noted    Acute URI 2019     Past Medical History:   Diagnosis Date    Anxiety     Arthritis     Cancer (HCC)     Colon polyp     Depression     Hyperlipidemia     Osteopenia     Skin cancer      Past Surgical History:   Procedure Laterality Date    COLONOSCOPY      FOOT SURGERY      toe repair right 2nd toe    JOINT REPLACEMENT Bilateral     TKR    SQUAMOUS CELL CARCINOMA EXCISION Left     Lesion removed from posterior wrist     TOTAL KNEE ARTHROPLASTY       Social History:   Social History     Socioeconomic History    Marital status:      Spouse name: None    Number of children: 4    Years of education: None    Highest education level: None   Occupational History    Occupation: retired nurse   Tobacco Use    Smoking status: Never    Smokeless tobacco: Never   Vaping Use    Vaping status: Never Used   Substance and Sexual Activity    Alcohol use: No    Drug use: No    Sexual activity: Yes     Partners: Male   Other Topics Concern    None   Social History Narrative    Home: Lives with one of her sons (Eh) and his family    Pt  first --he  after the fact    Has 4 Children--sons born ,,  (this son  in ),         Education:     Pt denies any h/o learning disabilities and reached childhood milestones on time as far as she knows    Graduated high school     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    RN degree from [employer]     Social Drivers of Health     Financial Resource Strain: Low Risk  (10/21/2024)    Received from [facility]    Overall Financial Resource Strain (CARDIA)     Difficulty of Paying Living Expenses: Not very hard   Food Insecurity: No Food Insecurity (2024)    Nursing - Inadequate Food Risk Classification     Worried About Running Out of Food in the Last Year: Not on file     Ran Out of Food in the Last  Year: Not on file     Ran Out of Food in the Last Year: 1   Transportation Needs: No Transportation Needs (2024)    Nursing - Transportation Risk Classification     Lack of Transportation: Not on file     Lack of Transportation: 2   Physical Activity: Unknown (3/12/2019)    Exercise Vital Sign     Days of Exercise per Week: 0 days     Minutes of Exercise per Session: Not on file   Stress: Not on file   Social Connections: Not on file   Intimate Partner Violence: Unknown (2024)    Nursing IPS     Feels Physically and Emotionally Safe: Not on file     Physically Hurt by Someone: Not on file     Humiliated or Emotionally Abused by Someone: Not on file     Physically Hurt by Someone: 2     Hurt or Threatened by Someone: 2   Housing Stability: Unknown (2024)    Nursing: Inadequate Housing Risk Classification     Has Housing: Not on file     Worried About Losing Housing: Not on file     Unable to Get Utilities: Not on file     Unable to Pay for Housing in the Last Year: 2     Has Housin     Family History:   Family History   Problem Relation Age of Onset    Depression Mother     Hypertension Mother     Breast cancer Mother     Diabetes Mother     Lymphoma Father      Review of Systems   Constitutional:  Negative for chills and fever.   HENT:  Positive for trouble swallowing.    Eyes: Negative.    Respiratory:  Negative for cough and shortness of breath.    Cardiovascular:  Negative for chest pain.   Gastrointestinal:  Negative for abdominal pain, nausea and vomiting.   Genitourinary:  Positive for frequency (chronic) and urgency (chronic). Negative for difficulty urinating, dysuria and hematuria.   Musculoskeletal:  Positive for gait problem.   Skin:  Positive for rash.   Neurological:  Positive for weakness. Negative for dizziness and headaches.   Psychiatric/Behavioral:  Positive for dysphoric mood. The patient is nervous/anxious.    All other systems reviewed and are negative.    Physical Exam  "  Vitals: Blood pressure 135/62, pulse 67, temperature 98 °F (36.7 °C), temperature source Temporal, resp. rate 18, height 5' 2\" (1.575 m), weight 68.9 kg (152 lb), SpO2 94%.,Body mass index is 27.8 kg/m².  Constitutional: Awake, alert, in no acute distress.  Head: Normocephalic and atraumatic.   Mouth/Throat: Oropharynx is clear and moist.    Eyes: Conjunctivae and EOM are normal.   Neck: Neck supple. No thyromegaly present.   Cardiovascular: Normal rate, regular rhythm.  Pulmonary/Chest: Effort normal and breath sounds normal.   Abdominal: Soft. Bowel sounds are normal. There is no tenderness.   Neurological: No focal deficits.  Generalized weakness.  Ambulating with walker.   Skin: Skin is warm and dry.     Assessment     Luba Barajas is a(n) 89 y.o. female with MDD.    Cardiac with hx of HTN, HLD.  Continue lisinopril 10 mg daily and pravastatin 40 mg daily (rosuvastatin nonform).  Gait abnormality.  PT OT consulted.  Urinary frequency/incontinence.  Chronic and follows with GYN.  UA without evidence of UTI 11/26.  Tried medications for OAB in the past but stopped due to side effects.   Cutaneous candidiasis.  Apply nystatin powder twice daily.  Dysphagia.  SLP consulted.   Vitamin D/B12 deficiency.  Continue daily supplements.   MDD.  This is being managed by the psych team.       Prognosis: Fair.    Discharge Plan: In progress.    Advanced Directives: I have discussed in detail with the patient the advanced directives. The patient states her son, Jese Barajas, is her POA whose number is [phone].  She states she does have a living will. When discussing cardiac and pulmonary resuscitation efforts with the patient, the patient wishes to be full code.    I have spent more than 50 minutes gathering data, doing physical examination, and discussing the advanced directives, which was witnessed by caring staff.    The patient was discussed with Dr. Bailon and he is in agreement with the above note.  "

## 2024-11-27 NOTE — TREATMENT PLAN
TREATMENT PLAN REVIEW - Behavioral Health Luba ANIKA Barajas 89 y.o. 1935 female MRN: [id number]    [facility] Room / Bed: [facility]-02 Encounter: [id number]          Admit Date/Time:  11/26/2024  9:49 PM    Treatment Team:   MD Kelsie Khan LPC Nicole L Saas Isabelle Kenna Celeste Pawling Paul F Klose, RN Patricia Solt, ROMELIA Stockton RN    Diagnosis: Principal Problem:    MDD (major depressive disorder), recurrent episode, severe (HCC)  Active Problems:    Generalized anxiety disorder    HTN (hypertension)      Patient Strengths/Assets: family ties, negotiates basic needs, patient is on a voluntary commitment    Patient Barriers/Limitations: difficulty adapting, poor physical health, poor reasoning ability    Short Term Goals: decrease in depressive symptoms, decrease in anxiety symptoms, ability to stay safe on the unit, improvement in reasoning ability, sleep improvement, improvement in appetite, mood stabilization, increase in socialization with peers on the unit, acceptance of need for psychiatric treatment, acceptance of psychiatric medications    Long Term Goals: improvement in depression, improvement in anxiety, stabilization of mood, improvement in reasoning ability, improved insight, acceptance of need for psychiatric medications, acceptance of need for psychiatric treatment, adequate self care, adequate sleep, adequate appetite, adequate oral intake, appropriate interaction with peers    Progress Towards Goals: starting psychiatric medications as prescribed    Recommended Treatment: medication management, patient medication education, group therapy, milieu therapy, continued Behavioral Health psychiatric evaluation/assessment process, medical follow up with medical team    Treatment Frequency: daily medication monitoring, group and milieu therapy daily, monitoring through interdisciplinary rounds, monitoring  through weekly patient care conferences    Expected Discharge Date: 7 midnights     Discharge Plan: will be determined    Treatment Plan Created/Updated By: Renard Keyes MD

## 2024-11-27 NOTE — NURSING NOTE
Patient complained of severe anxiety. Given traZODone (DESYREL) tablet 50 mg PO at 2310. Stacy Scale 27. Reassessed at 0010. Stacy Scale 19. Remains restless, unable to sleep.

## 2024-11-27 NOTE — PROGRESS NOTES
11/27/24    Team Meeting   Meeting Type Tx Team Meeting   Team Members Present   Team Members Present Physician;Nurse;   Physician Team Member Stephy SORENSON   Nursing Team Member Mahin LEÓN   Social Work Team Member Benjie URIOSTEGUI   Patient/Family Present   Patient Present Yes   Patient's Family Present No   Tx team reviewed pt strengths,limitations,tx goals and plan.  All in agreement and signed.

## 2024-11-27 NOTE — NURSING NOTE
Patient has been visible throughout the shift. She is pleasant and cooperative. At times she is tearful. At times she is tearful. Ambulates with walker and 1 assist when in halls. Medication compliant. She denied depression,SI,HI, or hallucinations. She did endorse having some anxiety. Reassurance provided. Attends groups. Safety precautions maintained.

## 2024-11-27 NOTE — PROGRESS NOTES
11/27/24 0837   Activity/Group Checklist   Group Admission/Discharge   Attendance Attended   Attendance Duration (min) 0-15   Interactions Interacted appropriately   Affect/Mood Appropriate  (some forgetfulness)   Goals Achieved Identified feelings;Discussed coping strategies;Identified resources and support systems;Able to listen to others;Able to engage in interactions;Able to manage/cope with feelings;Able to self-disclose;Able to recieve feedback     Patient is agreeable to meet and complete self assessment with [facility].  Patient information from form can be found in media.

## 2024-11-27 NOTE — NURSING NOTE
Patient was given 25 mg atarax at 11:49 am for increased anxiety. Stacy score was 17. Patient currently resting in her room. Continue to monitor.

## 2024-11-27 NOTE — ED NOTES
Insurance Authorization for admission:   Phone call placed to [employer]  Phone number: [phone]   Spoke to Ron REDDYTony     8 days approved 11/26/2024 to 12/3/2024 which is last covered day  Level of care: [facility] 201 Admit   Review on 12/3/2024   Authorization # [id number]      Eligibility Verification System checked - ([phone]).  Online system / automated system indicates: **    Insurance Authorization for Transportation:    Phone call placed to **.   Phone number **.   Spoke to **.   Authorization #: **

## 2024-11-27 NOTE — PLAN OF CARE
Problem: Ineffective Coping  Goal: Cooperates with admission process  Description: Interventions:   - Complete admission process  Outcome: Progressing   New admit as of 11/26/2024; care plan initiated.

## 2024-11-27 NOTE — PROGRESS NOTES
11/27/24    Team Meeting   Meeting Type Daily Rounds   Team Members Present   Team Members Present Physician;Nurse;;Occupational Therapist   Physician Team Member Stephy SORENSON   Nursing Team Member Mahin RN   Social Work Team Member Benjie URIOSTEGUI   OT Team Member Joshua AVILA   Patient/Family Present   Patient Present No   Patient's Family Present No   201, new admit, UTI, severe anx, lives assisted living, poor sleep, walker, fall risk, PRNs, refused bld work, alert, tearful, confused, ST eval

## 2024-11-27 NOTE — NURSING NOTE
Patient admitted to Older Adult Behavior Health Unit, [facility] from [facility] with a valid 201.  Physical assessment completed, buttock had noted dry skin and 2 non-blanchable red areas on left buttock. Groin folds were pink with no open areas .  Refused shower, no signs of infestation noted.  Bill of Rights and HIPPA regulations reviewed and signed.  Admission medication and diet ordered.  Oriented to unit.  Started on Q 15 minute safety checks. Fluids and food given.  Appetite fair.  Personal property recording start. Patient was severely anxious, fearful, and tearful. CLARISA signed for Eh Barajas (son) and Kira Barajas (daughter-in-law). PTA-Med Rec complete with help from daughter-in-law. Using walker to ambulate. Given Tap Guan at bedside. Helped by 2 staff to dress in unit clothing with pull-up.

## 2024-11-28 LAB
25(OH)D3 SERPL-MCNC: 37.1 NG/ML (ref 30–100)
ALBUMIN SERPL BCG-MCNC: 3.7 G/DL (ref 3.5–5)
ALP SERPL-CCNC: 59 U/L (ref 34–104)
ALT SERPL W P-5'-P-CCNC: 8 U/L (ref 7–52)
ANION GAP SERPL CALCULATED.3IONS-SCNC: 5 MMOL/L (ref 4–13)
AST SERPL W P-5'-P-CCNC: 12 U/L (ref 13–39)
BASOPHILS # BLD AUTO: 0.06 THOUSANDS/ÂΜL (ref 0–0.1)
BASOPHILS NFR BLD AUTO: 1 % (ref 0–1)
BILIRUB SERPL-MCNC: 0.74 MG/DL (ref 0.2–1)
BUN SERPL-MCNC: 21 MG/DL (ref 5–25)
CALCIUM SERPL-MCNC: 9.4 MG/DL (ref 8.4–10.2)
CHLORIDE SERPL-SCNC: 105 MMOL/L (ref 96–108)
CHOLEST SERPL-MCNC: 135 MG/DL (ref ?–200)
CO2 SERPL-SCNC: 28 MMOL/L (ref 21–32)
CREAT SERPL-MCNC: 0.74 MG/DL (ref 0.6–1.3)
EOSINOPHIL # BLD AUTO: 0.15 THOUSAND/ÂΜL (ref 0–0.61)
EOSINOPHIL NFR BLD AUTO: 3 % (ref 0–6)
ERYTHROCYTE [DISTWIDTH] IN BLOOD BY AUTOMATED COUNT: 14.3 % (ref 11.6–15.1)
FOLATE SERPL-MCNC: 13.5 NG/ML
GFR SERPL CREATININE-BSD FRML MDRD: 73 ML/MIN/1.73SQ M
GLUCOSE SERPL-MCNC: 98 MG/DL (ref 65–140)
HCT VFR BLD AUTO: 37.6 % (ref 34.8–46.1)
HDLC SERPL-MCNC: 58 MG/DL
HGB BLD-MCNC: 12 G/DL (ref 11.5–15.4)
IMM GRANULOCYTES # BLD AUTO: 0.01 THOUSAND/UL (ref 0–0.2)
IMM GRANULOCYTES NFR BLD AUTO: 0 % (ref 0–2)
LDLC SERPL CALC-MCNC: 63 MG/DL (ref 0–100)
LYMPHOCYTES # BLD AUTO: 0.64 THOUSANDS/ÂΜL (ref 0.6–4.47)
LYMPHOCYTES NFR BLD AUTO: 13 % (ref 14–44)
MCH RBC QN AUTO: 28.8 PG (ref 26.8–34.3)
MCHC RBC AUTO-ENTMCNC: 31.9 G/DL (ref 31.4–37.4)
MCV RBC AUTO: 90 FL (ref 82–98)
MONOCYTES # BLD AUTO: 0.44 THOUSAND/ÂΜL (ref 0.17–1.22)
MONOCYTES NFR BLD AUTO: 9 % (ref 4–12)
NEUTROPHILS # BLD AUTO: 3.7 THOUSANDS/ÂΜL (ref 1.85–7.62)
NEUTS SEG NFR BLD AUTO: 74 % (ref 43–75)
NONHDLC SERPL-MCNC: 77 MG/DL
NRBC BLD AUTO-RTO: 0 /100 WBCS
PLATELET # BLD AUTO: 253 THOUSANDS/UL (ref 149–390)
PMV BLD AUTO: 10.1 FL (ref 8.9–12.7)
POTASSIUM SERPL-SCNC: 4 MMOL/L (ref 3.5–5.3)
PROT SERPL-MCNC: 6 G/DL (ref 6.4–8.4)
RBC # BLD AUTO: 4.16 MILLION/UL (ref 3.81–5.12)
SODIUM SERPL-SCNC: 138 MMOL/L (ref 135–147)
TRIGL SERPL-MCNC: 72 MG/DL (ref ?–150)
VIT B12 SERPL-MCNC: 429 PG/ML (ref 180–914)
WBC # BLD AUTO: 5 THOUSAND/UL (ref 4.31–10.16)

## 2024-11-28 PROCEDURE — 85025 COMPLETE CBC W/AUTO DIFF WBC: CPT | Performed by: PSYCHIATRY & NEUROLOGY

## 2024-11-28 PROCEDURE — 86780 TREPONEMA PALLIDUM: CPT | Performed by: PSYCHIATRY & NEUROLOGY

## 2024-11-28 PROCEDURE — 80061 LIPID PANEL: CPT | Performed by: PSYCHIATRY & NEUROLOGY

## 2024-11-28 PROCEDURE — 82306 VITAMIN D 25 HYDROXY: CPT | Performed by: PSYCHIATRY & NEUROLOGY

## 2024-11-28 PROCEDURE — 80053 COMPREHEN METABOLIC PANEL: CPT | Performed by: PSYCHIATRY & NEUROLOGY

## 2024-11-28 PROCEDURE — 99232 SBSQ HOSP IP/OBS MODERATE 35: CPT

## 2024-11-28 PROCEDURE — 82746 ASSAY OF FOLIC ACID SERUM: CPT | Performed by: PSYCHIATRY & NEUROLOGY

## 2024-11-28 PROCEDURE — 82607 VITAMIN B-12: CPT | Performed by: PSYCHIATRY & NEUROLOGY

## 2024-11-28 RX ORDER — POLYETHYLENE GLYCOL 3350 17 G/17G
17 POWDER, FOR SOLUTION ORAL DAILY PRN
Status: DISCONTINUED | OUTPATIENT
Start: 2024-11-28 | End: 2024-12-23 | Stop reason: HOSPADM

## 2024-11-28 RX ORDER — AMOXICILLIN 250 MG
1 CAPSULE ORAL
Status: DISCONTINUED | OUTPATIENT
Start: 2024-11-28 | End: 2024-12-23 | Stop reason: HOSPADM

## 2024-11-28 RX ADMIN — LISINOPRIL 10 MG: 10 TABLET ORAL at 08:14

## 2024-11-28 RX ADMIN — PAROXETINE 10 MG: 10 TABLET, FILM COATED ORAL at 08:14

## 2024-11-28 RX ADMIN — PRAVASTATIN SODIUM 40 MG: 40 TABLET ORAL at 17:06

## 2024-11-28 RX ADMIN — SENNOSIDES AND DOCUSATE SODIUM 1 TABLET: 8.6; 5 TABLET ORAL at 17:06

## 2024-11-28 RX ADMIN — ANORECTAL OINTMENT: 15.7; .44; 24; 20.6 OINTMENT TOPICAL at 08:14

## 2024-11-28 RX ADMIN — CYANOCOBALAMIN TAB 500 MCG 1000 MCG: 500 TAB at 08:14

## 2024-11-28 RX ADMIN — HYDROXYZINE HYDROCHLORIDE 25 MG: 25 TABLET ORAL at 12:17

## 2024-11-28 RX ADMIN — Medication 2000 UNITS: at 08:14

## 2024-11-28 RX ADMIN — HYDROXYZINE HYDROCHLORIDE 50 MG: 50 TABLET, FILM COATED ORAL at 18:13

## 2024-11-28 NOTE — NUTRITION
11/28/24 1225   Biochemical Data,Medical Tests, and Procedures   Biochemical Data/Medical Tests/Procedures Lab values reviewed;Meds reviewed   Labs (Comment) Vit D, klonopin, Vit B12, atarax, paxil, pravachol, inderal, desyrel   Speech Therapy Recommendations (Comment) Patient to be seen by ST.Reports difficulty swallowing at times, mostly with meats. Discussed chopped meats, she is agreeable. ST notified.   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   PES Statement   Problem Intake   Energy Balance (1) Predicted suboptimal energy intake NI-1.4   Related to Depression   As evidenced by: Intake < estimated needs;Per patient/family interview   Recommendations/Interventions   Malnutrition/BMI Present No  (does not meet criteria; will monitor)   Summary Speech. Regular diet thin liquids. Meal completions vary 0-75%.  She states her appetite is not very good. She is unable provide how long her appetite has been decreased. She reports no diet plan. Lives alone, cooks for self. Tries to consume 3 meals per day. Patient very tearful throughout conversation; support provided. 11/26/#; 10/1/#; 9/9/#; weight stable. Patient to be seen by ST.  Reports difficulty swallowing at times, mostly with meats. Discussed chopped meats, she is agreeable.   notified. Skin intact. Reports she dislikes nutrition supplements.   Interventions/Recommendations Adjust diet order   Intervention Comments provide chopped meats.   Education Assessment   Education Education not indicated at this time   Patient Nutrition Goals   Goal Avoid weight loss;Meet PO needs   Goal Status Initiated   Timeframe to complete goal by next f/u   Nutrition Complexity Risk   Nutrition complexity level High risk   Follow up date 12/05/24

## 2024-11-28 NOTE — PROGRESS NOTES
"Progress Note - Luba Barajas 89 y.o. female MRN: [id number]    Unit/Bed#: -02 Encounter: [id number]        Subjective:   Patient seen and examined at bedside after reviewing the chart and discussing the case with the caring staff.      Patient examined at bedside.  Patient feels constipated and is requesting something for it as she did not had any bowel movement for the past 3 to 4 days.  Patient has no stool softeners currently.    Patient's vitamin D and B12 levels are still pending.    Physical Exam   Vitals: Blood pressure 132/65, pulse 74, temperature 97.6 °F (36.4 °C), temperature source Temporal, resp. rate 16, height 5' 2\" (1.575 m), weight 68.9 kg (152 lb), SpO2 96%.,Body mass index is 27.8 kg/m².  Constitutional: He appears well-developed.   HEENT: PERR, EOMI, MMM  Cardiovascular: Normal rate and regular rhythm.    Pulmonary/Chest: Effort normal and breath sounds normal.   Abdomen: Soft, + BS, NT    Assessment/Plan:  Luba Barajas is a(n) 89 y.o. female with MDD.     Cardiac with hx of HTN, HLD.  Continue lisinopril 10 mg daily and pravastatin 40 mg daily (rosuvastatin nonform).  Gait abnormality.  PT OT consulted.  Urinary frequency/incontinence.  Chronic and follows with GYN.  UA without evidence of UTI 11/26.  Tried medications for OAB in the past but stopped due to side effects.   Cutaneous candidiasis.  Apply nystatin powder twice daily.  Dysphagia.  SLP consulted.   Vitamin D/B12 deficiency.  Continue daily supplements.  Constipation.  I will put the patient on Senokot as 1 tablet daily along with MiraLAX as needed 11/28/2024.  "

## 2024-11-28 NOTE — NURSING NOTE
Patient has been observed sleeping on the majority of Q 15 minute rounds.  Patient shows no s/s of distress.  Non-labored breathing.  Monitoring continues.

## 2024-11-28 NOTE — PROGRESS NOTES
"  Progress Note - Behavioral Health   Luba Barajas 89 y.o. female MRN: [id number]  Unit/Bed#: [facility] 201-02 Encounter: [id number]    Assessment & Plan   Principal Problem:    MDD (major depressive disorder), recurrent episode, severe (HCC)  Active Problems:    Generalized anxiety disorder    HTN (hypertension)    Assessment & Plan  Generalized anxiety disorder  Continue Paxil 10 mg daily  MDD (major depressive disorder), recurrent episode, severe (HCC)  Continue Paxil 10 mg daily      Subjective:Patient was seen today for continuation of care, records reviewed and  patient was discussed with the morning case review team.  Recently admitted to unit for depression, hopelessness, helplessness, decreased appetite, anxiety.  No acute behaviors on the unit, she expresses moderate anxiety and periods of hopelessness and helplessness with negative thoughts.  States \"I am never going to get better\", supportive counseling provided.  Ambulates with a walker, states her legs are getting weaker.  Patient noted to have allergy to melatonin which is scheduled medication, melatonin discontinued today.  States she slept well yesterday without melatonin and no new orders placed. Patient denies endorsing any suicidal or homicidal ideation. Remains medication compliance. Denies any side effects from medications.    Psychiatric Review of Systems:    Sleep: normal  Appetite: fair  Medication side effects: No   ROS: no complaints    Vitals:  Vitals:    11/28/24 0719   BP: 132/65   Pulse: 74   Resp: 16   Temp: 97.6 °F (36.4 °C)   SpO2: 96%       Mental Status Evaluation:    Appearance:  age appropriate, casually dressed   Behavior:  cooperative   Speech:  normal rate and volume   Mood:  depressed, anxious   Affect:  constricted   Thought Process:  negative thinking   Associations: concrete associations   Thought Content:  no overt delusions   Perceptual Disturbances: no auditory hallucinations, no visual hallucinations   Risk Potential: " Suicidal ideation - None at present  Homicidal ideation - None  Potential for aggression - No   Sensorium:  oriented to person, place, and time/date   Memory:  recent and remote memory grossly intact   Consciousness:  alert and awake   Attention: attention span and concentration are age appropriate   Insight:  partial   Judgment: partial   Gait/Station: uses walker   Motor Activity: no abnormal movements     Laboratory results:  I have personally reviewed all pertinent laboratory/tests results.  Most Recent Labs:   Lab Results   Component Value Date    WBC 5.00 11/28/2024    RBC 4.16 11/28/2024    HGB 12.0 11/28/2024    HCT 37.6 11/28/2024     11/28/2024    RDW 14.3 11/28/2024    NEUTROABS 3.70 11/28/2024    SODIUM 138 11/28/2024    K 4.0 11/28/2024     11/28/2024    CO2 28 11/28/2024    BUN 21 11/28/2024    CREATININE 0.74 11/28/2024    GLUC 98 11/28/2024    CALCIUM 9.4 11/28/2024    AST 12 (L) 11/28/2024    ALT 8 11/28/2024    ALKPHOS 59 11/28/2024    TP 6.0 (L) 11/28/2024    ALB 3.7 11/28/2024    TBILI 0.74 11/28/2024    CHOLESTEROL 135 11/28/2024    HDL 58 11/28/2024    TRIG 72 11/28/2024    LDLCALC 63 11/28/2024    NONHDLC 77 11/28/2024    [id number] 0.934 11/26/2024    RPR Non-Reactive 03/11/2016         Recommended Treatment:     All current active medications have been reviewed  Encourage group therapy, milieu therapy and occupational therapy  Behavioral Health checks for safety monitoring  Continue treatment with group therapy, milieu therapy and occupational therapy  Continue current medications:  Current Facility-Administered Medications   Medication Dose Route Frequency Provider Last Rate    acetaminophen  650 mg Oral Q4H PRN Myrna Caldwell MD      acetaminophen  650 mg Oral Q4H PRN Myrna Caldwell MD      acetaminophen  975 mg Oral Q6H PRN Myrna Caldwell MD      aluminum-magnesium hydroxide-simethicone  30 mL Oral Q4H PRN Myrna Caldwell MD      Cholecalciferol  2,000 Units Oral  Daily Clare Brennan PA-C      clonazePAM  0.25 mg Oral BID PRN Renard Keyes MD      cyanocobalamin  1,000 mcg Oral Daily Clare Brennan PA-C      haloperidol lactate  5 mg Intramuscular Q4H PRN Max 4/day Myrna Caldwell MD      hydrOXYzine HCL  25 mg Oral Q6H PRN Max 4/day Myrna Caldwell MD      hydrOXYzine HCL  50 mg Oral Q6H PRN Max 4/day Myrna Caldwell MD      lisinopril  10 mg Oral Daily Clare Brennan PA-C      melatonin  3 mg Oral HS Myrna Caldwell MD      menthol-zinc oxide   Topical BID Clare Brennan PA-C      nicotine polacrilex  4 mg Oral Q2H PRN Myrna Caldwell MD      nystatin   Topical BID AGUILAR DouglasC      PARoxetine  10 mg Oral Daily Renard Keyes MD      pravastatin  40 mg Oral Daily With Dinner Clare Brennan PA-C      propranolol  5 mg Oral Q8H PRN Myrna Caldwell MD      risperiDONE  0.25 mg Oral Q4H PRN Max 6/day Myrna Caldwell MD      risperiDONE  0.5 mg Oral Q4H PRN Max 3/day Myrna Caldwell MD      risperiDONE  1 mg Oral Q2H PRN Max 3/day Myrna Caldwell MD      traZODone  50 mg Oral Q6H PRN Max 3/day Myrna Caldwell MD         Risks / Benefits of Treatment:     Risks, benefits, and possible side effects of medications explained to patient. Patient has limited understanding of risks and benefits of treatment at this time, but agrees to take medications as prescribed.    Counseling / Coordination of Care:     Patient's progress reviewed with nursing staff.  Medications, treatment progress and treatment plan reviewed with patient.  Supportive counseling provided to the patient.          VAL Goetz

## 2024-11-28 NOTE — NURSING NOTE
Patient was tearful this morning and stating that she felt scared and afraid. Patient was reassured that she is safe on the unit. Patient wanted to sit down in the dining room by herself but several other patients encouraged her to sit with them and she did. Patient endorsed moderate anxiety, no depression, pain or SI/HI, AVH. Patient does not know why she feels scared, but feels safe with her roommate. Patient is able to make needs known. Continuous safety monitoring in place.

## 2024-11-28 NOTE — NURSING NOTE
Patient came up to the dietitian stating she was very nervous, scared and tearful. Patient did not want to come out of her room to eat lunch due to being scared. Nurse reinforced that it is safe on the unit and walked the patient down to the dining room for thanksgiving dinner. Patient received atarax 25 mg for a cook of 15. Will re-assess for effectiveness. Continuous safety monitoring in place.

## 2024-11-28 NOTE — PLAN OF CARE
Problem: PAIN - ADULT  Goal: Verbalizes/displays adequate comfort level or baseline comfort level  Description: Interventions:  - Encourage patient to monitor pain and request assistance  - Assess pain using appropriate pain scale  - Administer analgesics based on type and severity of pain and evaluate response  - Implement non-pharmacological measures as appropriate and evaluate response  - Consider cultural and social influences on pain and pain management  - Notify physician/advanced practitioner if interventions unsuccessful or patient reports new pain  Outcome: Progressing     Problem: SAFETY ADULT  Goal: Patient will remain free of falls  Description: INTERVENTIONS:  - Educate patient/family on patient safety including physical limitations  - Instruct patient to call for assistance with activity   - Consult OT/PT to assist with strengthening/mobility   - Keep Call bell within reach  - Keep bed low and locked with side rails adjusted as appropriate  - Keep care items and personal belongings within reach  - Initiate and maintain comfort rounds  - Make Fall Risk Sign visible to staff  - Offer Toileting every 2 Hours, in advance of need  - Initiate/Maintain bed alarm  - Obtain necessary fall risk management equipment: walker  - Apply yellow socks and bracelet for high fall risk patients  - Consider moving patient to room near nurses station  Outcome: Progressing     Problem: Ineffective Coping  Goal: Demonstrates healthy coping skills  Outcome: Progressing     Problem: Depression  Goal: Verbalize thoughts and feelings  Description: Interventions:  - Assess and re-assess patient's level of risk   - Engage patient in 1:1 interactions, daily, for a minimum of 15 minutes   - Encourage patient to express feelings, fears, frustrations, hopes   Outcome: Progressing  Goal: Refrain from harming self  Description: Interventions:  - Monitor patient closely, per order   - Supervise medication ingestion, monitor effects and  side effects   Outcome: Progressing     Problem: Nutrition/Hydration-ADULT  Goal: Nutrient/Hydration intake appropriate for improving, restoring or maintaining nutritional needs  Description: Monitor and assess patient's nutrition/hydration status for malnutrition. Collaborate with interdisciplinary team and initiate plan and interventions as ordered.  Monitor patient's weight and dietary intake as ordered or per policy. Utilize nutrition screening tool and intervene as necessary. Determine patient's food preferences and provide high-protein, high-caloric foods as appropriate.     INTERVENTIONS:  - Monitor oral intake, urinary output, labs, and treatment plans  - Assess nutrition and hydration status and recommend course of action  - Evaluate amount of meals eaten  - Assist patient with eating if necessary   - Allow adequate time for meals  - Recommend/ encourage appropriate diets, oral nutritional supplements, and vitamin/mineral supplements  - Order, calculate, and assess calorie counts as needed  - Recommend, monitor, and adjust tube feedings and TPN/PPN based on assessed needs  - Assess need for intravenous fluids  - Provide specific nutrition/hydration education as appropriate  - Include patient/family/caregiver in decisions related to nutrition  Outcome: Progressing

## 2024-11-28 NOTE — NURSING NOTE
Patient visible on the unit. Calm and cooperative. Denies SI, HI, and hallucinations. Mild anxiety and depression. Complains of fatigue. Refused HS Melatonin. Will continue to monitor and access. Q 15 minute safety checks maintained.

## 2024-11-28 NOTE — NURSING NOTE
"Patient very tearful after talking to her son. Patient was stating \"I am scared, am I ever going to get better.\" Patient shaking and kept saying that she was scared. Patient received atarax 50 mg for a cook score of 21. Will re-assess for effectiveness. Patient is currently resting in her room. Continuous safety monitoring in place.   "

## 2024-11-29 LAB — TREPONEMA PALLIDUM IGG+IGM AB [PRESENCE] IN SERUM OR PLASMA BY IMMUNOASSAY: NORMAL

## 2024-11-29 PROCEDURE — 99232 SBSQ HOSP IP/OBS MODERATE 35: CPT | Performed by: PSYCHIATRY & NEUROLOGY

## 2024-11-29 PROCEDURE — 92610 EVALUATE SWALLOWING FUNCTION: CPT | Performed by: NURSE PRACTITIONER

## 2024-11-29 PROCEDURE — 97166 OT EVAL MOD COMPLEX 45 MIN: CPT

## 2024-11-29 PROCEDURE — 97161 PT EVAL LOW COMPLEX 20 MIN: CPT

## 2024-11-29 PROCEDURE — 93005 ELECTROCARDIOGRAM TRACING: CPT

## 2024-11-29 RX ORDER — SERTRALINE HYDROCHLORIDE 25 MG/1
25 TABLET, FILM COATED ORAL DAILY
Status: DISCONTINUED | OUTPATIENT
Start: 2024-11-30 | End: 2024-12-02

## 2024-11-29 RX ORDER — HYDROXYZINE HYDROCHLORIDE 10 MG/1
10 TABLET, FILM COATED ORAL 3 TIMES DAILY
Status: DISCONTINUED | OUTPATIENT
Start: 2024-11-29 | End: 2024-12-03

## 2024-11-29 RX ADMIN — CYANOCOBALAMIN TAB 500 MCG 1000 MCG: 500 TAB at 08:23

## 2024-11-29 RX ADMIN — HYDROXYZINE HYDROCHLORIDE 10 MG: 10 TABLET, FILM COATED ORAL at 17:20

## 2024-11-29 RX ADMIN — LISINOPRIL 10 MG: 10 TABLET ORAL at 08:24

## 2024-11-29 RX ADMIN — Medication 2000 UNITS: at 09:07

## 2024-11-29 RX ADMIN — PAROXETINE 10 MG: 10 TABLET, FILM COATED ORAL at 08:24

## 2024-11-29 RX ADMIN — SENNOSIDES AND DOCUSATE SODIUM 1 TABLET: 8.6; 5 TABLET ORAL at 21:51

## 2024-11-29 RX ADMIN — NYSTATIN: 100000 POWDER TOPICAL at 09:08

## 2024-11-29 RX ADMIN — ANORECTAL OINTMENT: 15.7; .44; 24; 20.6 OINTMENT TOPICAL at 17:25

## 2024-11-29 RX ADMIN — ANORECTAL OINTMENT: 15.7; .44; 24; 20.6 OINTMENT TOPICAL at 09:08

## 2024-11-29 RX ADMIN — HYDROXYZINE HYDROCHLORIDE 10 MG: 10 TABLET, FILM COATED ORAL at 12:24

## 2024-11-29 RX ADMIN — HYDROXYZINE HYDROCHLORIDE 10 MG: 10 TABLET, FILM COATED ORAL at 21:50

## 2024-11-29 RX ADMIN — NYSTATIN: 100000 POWDER TOPICAL at 17:25

## 2024-11-29 RX ADMIN — PRAVASTATIN SODIUM 40 MG: 40 TABLET ORAL at 17:20

## 2024-11-29 RX ADMIN — ACETAMINOPHEN 650 MG: 325 TABLET ORAL at 04:18

## 2024-11-29 NOTE — OCCUPATIONAL THERAPY NOTE
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; and on nose    Colon polyp     Depression     HTN (hypertension)     HTN (hypertension)     Hyperlipidemia     Osteopenia     Skin cancer     Squamous cell on Left cheek     Past Surgical History  Past Surgical History:   Procedure Laterality Date    COLONOSCOPY      FOOT SURGERY      toe repair right 2nd toe    JOINT REPLACEMENT Bilateral     TKR    SQUAMOUS CELL CARCINOMA EXCISION Left     Lesion removed from posterior wrist     TOTAL KNEE ARTHROPLASTY           11/29/24 0750   OT Last Visit   OT Visit Date 11/29/24   Note Type   Note type Evaluation   Pain Assessment   Pain Assessment Tool 0-10   Pain Score No Pain   Restrictions/Precautions   Weight Bearing Precautions Per Order No   Other Precautions Cognitive   Home Living   Type of Home Assisted living  ([facility])   Home Equipment Walker  (Pt reports RW used at baseline)   Prior Function   Level of [place] Independent with functional mobility;Independent with ADLs;Needs assistance with IADLS   Lives With Facility staff   Receives Help From Personal care attendant   IADLs Family/Friend/Other provides transportation;Family/Friend/Other provides meals;Family/Friend/Other provides medication management   Falls in the last 6 months 0  (pt denies)   Vocational Retired   Subjective   Subjective \"I want to go home\"   ADL   Where Assessed Edge of bed   UB Bathing Assistance 6  Modified Independent   LB Bathing Assistance 6  Modified Independent   UB Dressing Assistance 6  Modified independent   LB Dressing Assistance 6  Modified independent   Toileting " Assistance  6  Modified independent   Bed Mobility   Additional Comments DNT; pt seated on EOB upon arrival and seated in dining room upon conclusion. denied dizziness with transitional movements   Transfers   Sit to Stand 6  Modified independent   Stand to Sit 6  Modified independent   Additional Comments RW used   Functional Mobility   Functional Mobility 6  Modified independent   Additional Comments Pt completed long distance IADL mobility from room > dining room at MI level w/ RW. no significant LOB observed, mild instability   Additional items Rolling walker   Balance   Static Sitting Normal   Dynamic Sitting Normal   Static Standing Good   Dynamic Standing Good   Ambulatory Good   Activity Tolerance   Activity Tolerance Patient tolerated treatment well   Medical Staff Made Aware Yes, CM made aware of d/c recs   Nurse Made Aware Yes, nursing staff made aware of session outcomes   RUE Assessment   RUE Assessment WFL   RUE Strength   RUE Overall Strength   (4-/5)   LUE Assessment   LUE Assessment WFL   LUE Strength   LUE Overall Strength   (4-/5)   Hand Function   Gross Motor Coordination Functional   Fine Motor Coordination Functional   Vision-Basic Assessment   Current Vision Wears glasses all the time   Psychosocial   Psychosocial (WDL) X   Patient Behaviors/Mood Anxious;Tearful   Cognition   Overall Cognitive Status Impaired   Arousal/Participation Alert;Responsive   Attention Attends with cues to redirect   Orientation Level Oriented X4   Memory Decreased recall of precautions;Decreased recall of recent events   Following Commands Follows one step commands without difficulty   Comments Pt agreeable to OT evaluation; nonsenical speech at times, emotional labile   Assessment   Limitation Decreased endurance;Decreased cognition   Prognosis Good   Assessment Pt is a 89 y.o. female seen for OT evaluation s/p admit to [facility] on 11/26/2024 w/ MDD (major depressive disorder), recurrent episode, severe (HCC).   Comorbidities affecting pt's functional performance at time of assessment include: YANCY, HTN, vit D deficiency. Personal factors affecting pt at time of IE include:behavioral pattern, decreased initiation and engagement , and health management . Prior to admission, pt was I with Adls and IADLs. Upon evaluation: pt is completing self-care tasks and functional mobility at mod I level. Pt is currently functioning at/around baseline and does not warrant any further inpatient OT services at this time. From OT standpoint, recommendation at time of d/c would be home with no post acute rehab needs.   Goals   Patient Goals to go home   Plan   OT Frequency Eval only   Discharge Recommendation   Rehab Resource Intensity Level, OT No post-acute rehabilitation needs   Additional Comments  The patient's raw score on the AM-PAC Daily Activity inpatient short form is 23, standardized score is 51.12, greater than 39.4. Patients at this level are likely to benefit from discharge with no post acute rehab needs. Please refer to the recommendation of the Occupational Therapist for safe discharge planning.   AM-PAC Daily Activity Inpatient   Lower Body Dressing 3   Bathing 4   Toileting 4   Upper Body Dressing 4   Grooming 4   Eating 4   Daily Activity Raw Score 23   Daily Activity Standardized Score (Calc for Raw Score >=11) 51.12   AM-PAC Applied Cognition Inpatient   Following a Speech/Presentation 2   Understanding Ordinary Conversation 3   Taking Medications 2   Remembering Where Things Are Placed or Put Away 2   Remembering List of 4-5 Errands 1   Taking Care of Complicated Tasks 1   Applied Cognition Raw Score 11   Applied Cognition Standardized Score 27.03     All needs met, pt seated in dining room upon conclusion of session  Gretchen REYES/HOLLEY

## 2024-11-29 NOTE — CASE MANAGEMENT
11/29/24    Team Meeting   Meeting Type Daily Rounds   Team Members Present   Team Members Present Physician;;Nurse;Occupational Therapist   Physician Team Member Stephy SORENSON, Adrien NEAL   Nursing Team Member Steph LEÓN   Social Work Team Member Gisela URIOSTEGUI   OT Team Member Halley TALAMANTES   Patient/Family Present   Patient Present No   Patient's Family Present No   201 , high anxiety Denies SI, HI , AVH and Depression , Shaky and tearful [place] 12:17 and 18:30 . RM change .

## 2024-11-29 NOTE — NURSING NOTE
Patient observed resting comfortably through out the night.No signs or symptoms of distress. Tylenol was given for back pain with good relief. Q 15 minute safety checks maintained.

## 2024-11-29 NOTE — PLAN OF CARE
Problem: PAIN - ADULT  Goal: Verbalizes/displays adequate comfort level or baseline comfort level  Description: Interventions:  - Encourage patient to monitor pain and request assistance  - Assess pain using appropriate pain scale  - Administer analgesics based on type and severity of pain and evaluate response  - Implement non-pharmacological measures as appropriate and evaluate response  - Consider cultural and social influences on pain and pain management  - Notify physician/advanced practitioner if interventions unsuccessful or patient reports new pain  Outcome: Progressing     Problem: SAFETY ADULT  Goal: Patient will remain free of falls  Description: INTERVENTIONS:  - Educate patient/family on patient safety including physical limitations  - Instruct patient to call for assistance with activity   - Consult OT/PT to assist with strengthening/mobility   - Keep Call bell within reach  - Keep bed low and locked with side rails adjusted as appropriate  - Keep care items and personal belongings within reach  - Initiate and maintain comfort rounds  - Make Fall Risk Sign visible to staff  - Offer Toileting every 2 Hours, in advance of need  - Initiate/Maintain bed alarm  - Obtain necessary fall risk management equipment: walker  - Apply yellow socks and bracelet for high fall risk patients  - Consider moving patient to room near nurses station  Outcome: Progressing     Problem: Depression  Goal: Verbalize thoughts and feelings  Description: Interventions:  - Assess and re-assess patient's level of risk   - Engage patient in 1:1 interactions, daily, for a minimum of 15 minutes   - Encourage patient to express feelings, fears, frustrations, hopes   Outcome: Progressing  Goal: Refrain from harming self  Description: Interventions:  - Monitor patient closely, per order   - Supervise medication ingestion, monitor effects and side effects   Outcome: Progressing     Problem: Anxiety  Goal: Anxiety is at manageable  level  Description: Interventions:  - Assess and monitor patient's anxiety level.   - Monitor for signs and symptoms (heart palpitations, chest pain, shortness of breath, headaches, nausea, feeling jumpy, restlessness, irritable, apprehensive).   - Collaborate with interdisciplinary team and initiate plan and interventions as ordered.  - [place] patient to unit/surroundings  - Explain treatment plan  - Encourage participation in care  - Encourage verbalization of concerns/fears  - Identify coping mechanisms  - Assist in developing anxiety-reducing skills  - Administer/offer alternative therapies  - Limit or eliminate stimulants  Outcome: Not Progressing     Problem: Nutrition/Hydration-ADULT  Goal: Nutrient/Hydration intake appropriate for improving, restoring or maintaining nutritional needs  Description: Monitor and assess patient's nutrition/hydration status for malnutrition. Collaborate with interdisciplinary team and initiate plan and interventions as ordered.  Monitor patient's weight and dietary intake as ordered or per policy. Utilize nutrition screening tool and intervene as necessary. Determine patient's food preferences and provide high-protein, high-caloric foods as appropriate.     INTERVENTIONS:  - Monitor oral intake, urinary output, labs, and treatment plans  - Assess nutrition and hydration status and recommend course of action  - Evaluate amount of meals eaten  - Assist patient with eating if necessary   - Allow adequate time for meals  - Recommend/ encourage appropriate diets, oral nutritional supplements, and vitamin/mineral supplements  - Order, calculate, and assess calorie counts as needed  - Recommend, monitor, and adjust tube feedings and TPN/PPN based on assessed needs  - Assess need for intravenous fluids  - Provide specific nutrition/hydration education as appropriate  - Include patient/family/caregiver in decisions related to nutrition  Outcome: Progressing

## 2024-11-29 NOTE — SPEECH THERAPY NOTE
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; and on nose    Colon polyp     Depression     HTN (hypertension)     HTN (hypertension)     Hyperlipidemia     Osteopenia     Skin cancer     Squamous cell on Left cheek     Past Surgical History  Past Surgical History:   Procedure Laterality Date    COLONOSCOPY      FOOT SURGERY      toe repair right 2nd toe    JOINT REPLACEMENT Bilateral     TKR    SQUAMOUS CELL CARCINOMA EXCISION Left     Lesion removed from posterior wrist     TOTAL KNEE ARTHROPLASTY       Chief Complaint chart review: \"depression, anxiety, and unstable mood \"     Bedside Swallow Evaluation:    Summary:  Pt presented w/ an oropharyngeal swallow function to continue regular solids/thin liquids and chopped level 3 meat. Patient reports dysphagia symptoms characterized by difficulty swallowing solids and \"feeling stuck\" ( I.e. bread and meat) as well as \"pills are slow to go down\" across the past few months. She was labile at times through assessment with several different verbalized worries but calmed with redirection. She second guessed herself several times through assessment if she is experiencing dysphagia or not. She reports belching \"sometimes\" and takes tums at home as needed. Today during assessment she did present with belching x2 with described regurgitation with sour taste. She was assessed with regular solids, level 3 solids and thin liquids via cup/straw. She was able to feed herself independently. Oral phase and pharyngeal phases of the swallow appeared WFL. No clinical overt s.s of aspiration. No " symptoms of dysphagia reported through assessment.    Recommendations:  Diet:regular solids ( chopped level 3 meat)  Liquid: thin liquids  Meds: whole with puree  Supervision: set up/clean up  Positioning:Upright  Strategies: slow rate, small bites, alternate bites/sips as needed  Pt to take PO/Meds only when fully alert and upright.   Oral care:continue  Aspiration precautions  Reflux precautions  Therapy Prognosis: good with adherence to recommendations  Frequency: 1-2x to ensure diet tolerance following downgrade    Consider consult w/:  Nutrition    Goal(s):  Pt will tolerate least restrictive diet w/out s/s aspiration or oral/pharyngeal difficulties.     H&P/Admit info/ pertinent provider notes: (PMH noted above)      Previous MBS: no history known to this service       Patient's goal: to go home    Did the pt report pain? no    Reason for consult:  R/o aspiration  Determine safest and least restrictive diet  C/o pill dysphagia  C/o solid food dysphagia      Precautions:    Aspiration    Food Allergies: None listed in chart or reported by patient    Current Diet:  Regular solids/thin liquids. Case d/w Dietician, placed her on chopped meats as she reports the most difficulty with meat. She states she thinks some of her difficulty swallowing is related to her nerves.    Premorbid diet: Regular/thin   O2 requirement: RA   Social/Prior living Lives alone, has 3 sons ( 2 reside nearby)   Voice/Speech: WFL   Follows commands: WFL   Cognitive status: Oriented to person, place, time; however, some confusion throughout assessment but also anxious/labile at times.     Oral [facility] exam:  Dentition: adequate  Lips (VII):WFL  Tongue (XII):WFL  Mandible (V):WFL  Face/oral sensation (V):WFL  Velum (X):WFL  Secretion management:WFL  Esophageal stage:  See above for reflux symptoms observed through assessment       Results d/w:  Pt, nursing

## 2024-11-29 NOTE — PROGRESS NOTES
Psycho Social  Patient is an 89 year old  female admitted to [facility] from an [facility] on 11/26/24 under a 201, Voluntary Commitment with reported marked increase in anxiety, tearfullness, limited function after being transferred from a facility's independent living to the assister living area.    Information obtained during the pre-admission Crisis ED eval is as follows:  The patient is an 89-year-old female who arrived to the emergency department with family from her assisted living facility at [facility], where she has been living for the past 3 to 4 years.  Patient was in independent living, but given the sharp increase in her anxiety recently, she was transitioned to the assisted living portion of the program last week.  Family estimates that her anxiety has been progressively getting worse over the last 2 months.     Over the last several months the patient has been seen at [facility] several times.  She has been there 3 times for emergency encounters related to her anxiety.  On the first 2 visits she was released home.  On the third visit she was admitted to [facility], which the patient and family indicate were a very negative and unproductive experience.  They have made efforts to out reach to her outpatient psychiatrist, Dr. Negron, but have been unsuccessful.  Her next appointment with him is scheduled for December 27.  They have been making efforts to arrange a therapist through [facility], but currently none has been assigned.  There is no additional case management or community supports outside of family.  The patient has been treated with Paxil at 30 mg.  She is also being treated with Klonopin with the recent increase in dosage.  There have been no other medication changes recently.     The patient reports and exhibits extreme and debilitating anxiety.  She can be completely fine 1 moment and then becomes very tearful, shaky, and repetitive, voicing a variety of  unrealistic fears.  For instance, she is very fearful of falling while getting dressed, using the walker, or even lying in bed.  She appears to have a sense of learned helplessness where she relies heavily on others to assist her with even menial tasks.  She is nihilistic with her thinking and envisions the worst possible outcomes despite having no recent falls.  The patient began utilizing a walker for ambulation recently due to her extreme anxiety about falling.  She becomes very easily overwhelmed with panic, becomes extremely tearful, and there is a notable increase in confusion and distractibility when anxious.       The patient denies any current or past suicidal ideas, plan, intent.  She denies any past attempts and denies self-injurious behavior.  She denies any current or past homicidal ideas, plan, or intent.  She denies any history of violence or aggression to others.  The patient does endorse depression with social withdrawal and isolation and tearfulness.  She also has a negative style of thinking to the point that it is nearly delusional with regard to her potential for falls and other dangers.  She is extremely fearful, but there appears to be no reason for this.  She denies any auditory or visual hallucinations and other than her nihilistic paranoia, there is no notable delusional thought.  The patient reports a decrease in appetite related to not liking the food at the facility.  Family indicates a slight increase in weight.  The patient reports that she has awful sleep but then describes how she sleeps from 10 PM until 4 AM and then naps throughout the day.     Patient contacted her family today expressing this recurrent anxiety and they opted to bring her to [facility] for evaluation and treatment due to the fact that there are multiple attempts to have her treated through [facility] have been unsuccessful.  They are requesting an in-network bed and do not want her considered for  "out of network placement at this time.  The patient is recurrently fearful, asking several times if people will be available to help her and if this is the right decision.  She expressed familiarity with inpatient treatment.  Rights and explanation of 72-hour notice were reviewed and provided.  Reassurance was offered repeatedly.  Family is very supportive and remained at bedside.  Referral was forwarded to intake for in-network placement consideration.               On interview, the patient was alert, oriented and receptive though markedly anxious with repeated episodes of tearfullness,  perseversating on not knowing the reason why.She expressed excessive worry and negativity as well as feeling she would never leave the hospital..   Current SI: Denied  Current HI: Denied  AVH: Denied  Depression:  Moderate  Anxiety:         Severe  Strengths:  Family ties, reasoning ability, able to negotiate needs.  Stressors/Limitations: Physical limitations; cognitive decline, poor insight, poor past treatment response.  Coping skills: TV, computer games, socializing  HX Mental Health: Reported initial diagnosis of Anxiety and Depression in her 30's. Intermittent OP treatment. Currently Med mgmt with [facility]  Past Hospitalizations: 10 years ago; most recently in [facility] though family facilitated d/c and reported \"not a good experience.\"  Medication Compliance: Reported compliance  SA/SI in last 12 months: Denied  HI/violence towards others in last 12  months: Denied  Access to Firearms: Denied  Hx abuse/trauma: Denied  Family HX Mental Health: Mother \"don't know diagnosis.\"  Family HX Suicide/Homicide: Denied  Family HX Substance Abuse: Denied  Family HX Dementia: Denied  Substance Abuse: Denied  Smoking Cessation: Denied  Legal Issues: Denied  Marital Status: Divorces more than 40 years ago following 25 years of marriage  Sexual Preference: Heterosexual  Children: 3 living of 4 children. 1 Son in [facility] with whom she has " regular phone contact. Also reported regular contact with 2 sons in the area. Stated all share POA   Parents:   Siblings: No contact with only brother  Pets:  None  Education HX: BS in Educations. 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   HX:  None  Mormonism Preference: None reported  Cultural needs: None reported  Financial: Stated her son handles finances  POA/guardianship/advanced: directives: 3 sons share POA  Pharmacy: Uncertain    Housing Stability-Dispo/211: Denied  Transportation:  Family  Food Insecurity:  None  Intimate Partner Violence: NA   Utilities:  No issue    Psychiatrist: Dr. Negron  Therapist: Agreeable to a referral  PCP:         Dr. Magaña  D&A:         NA  Case Management:  None  Family Contact:  SonEh: [phone]     24 1227   Patient Intake   Living Arrangement Other (Comment)  ([facility])   Can patient return home? Yes   Address to be Discharge to: See Facesheet   Patient's Telephone Number See Facesheet   Access to Firearms No   Work History Retired   Admission Status   Status of Admission 201   County of Residence [place]   Patient History   Treatment History [facility] 10 years ago; recent admission to [facility]   Currently in Treatment Yes   Current Psychiatrist/Therapist Dr. Negron, [facility]   Current Treatment Appt Info Appt. scheduled for 24   Medical Problems See medical H&P   Legal Issues Denied   Probation/ Name (if applicable) NA   Substance Abuse No   Crisis Info   Release of Information Signed Yes  (Psych, Therapy, PCP, Son)

## 2024-11-29 NOTE — PROGRESS NOTES
"Progress Note - Luba Barajas 89 y.o. female MRN: [id number]    Unit/Bed#: -01 Encounter: [id number]        Subjective:   Patient seen and examined at bedside after reviewing the chart and discussing the case with the caring staff.      Patient examined at bedside.  Patient denies any acute symptoms.     Physical Exam   Vitals: Blood pressure 116/62, pulse 68, temperature 98.1 °F (36.7 °C), temperature source Temporal, resp. rate 16, height 5' 2\" (1.575 m), weight 68.9 kg (152 lb), SpO2 97%.,Body mass index is 27.8 kg/m².  Constitutional: Patient in no acute distress.  HEENT: PERR, EOMI, MMM.  Cardiovascular: Normal rate and regular rhythm.    Pulmonary/Chest: Effort normal and breath sounds normal.   Abdomen: Soft, + BS, NT.    Assessment/Plan:  Luba Barajas is a(n) 89 y.o. female with MDD.     Cardiac with hx of HTN, HLD.  Continue lisinopril 10 mg daily and pravastatin 40 mg daily (rosuvastatin nonform).  Gait abnormality.  PT OT consulted.  Urinary frequency/incontinence.  Chronic and follows with GYN.  UA without evidence of UTI 11/26.  Tried medications for OAB in the past but stopped due to side effects.   Cutaneous candidiasis.  Apply nystatin powder twice daily.  Dysphagia.  SLP consulted.   Vitamin D/B12 deficiency.  Continue daily supplements.  Constipation.  Patient started on Senokot as 1 tablet daily along with MiraLAX as needed 11/28/24.    The patient was discussed with Dr. Bailon and he is in agreement with the above note.  "

## 2024-11-29 NOTE — PROGRESS NOTES
Progress Note - Behavioral Health   Name: Luba Barajas 89 y.o. female I MRN: [id number]   Unit/Bed#: [facility] 201-02 I Date of Admission: 11/26/2024   Date of Service: 11/29/2024 I Hospital Day: 3         Assessment & Plan  Generalized anxiety disorder    MDD (major depressive disorder), recurrent episode, severe (HCC)    HTN (hypertension)        Recommended Treatment:     Planned medication and treatment changes:    All current active medications have been reviewed  Encourage group therapy, milieu therapy and occupational therapy  Behavioral Health checks for safety monitoring  D/C Paxil. Zoloft 25 mg po daily.  Atarax 10 mg po tid.    Current medications:  Current Facility-Administered Medications   Medication Dose Route Frequency Provider Last Rate    acetaminophen  650 mg Oral Q4H PRN Myrna Caldwell MD      acetaminophen  650 mg Oral Q4H PRN Myrna Caldwell MD      acetaminophen  975 mg Oral Q6H PRN Myrna Caldwell MD      aluminum-magnesium hydroxide-simethicone  30 mL Oral Q4H PRN Myrna Caldwell MD      Cholecalciferol  2,000 Units Oral Daily Clare L Jonesnall, PA-C      clonazePAM  0.25 mg Oral BID PRN Renard Keyes MD      cyanocobalamin  1,000 mcg Oral Daily Clare LAKIA Brennan, PA-C      haloperidol lactate  5 mg Intramuscular Q4H PRN Max 4/day Myrna Caldwell MD      hydrOXYzine HCL  25 mg Oral Q6H PRN Max 4/day Myrna Caldwell MD      hydrOXYzine HCL  50 mg Oral Q6H PRN Max 4/day Myrna Caldwell MD      lisinopril  10 mg Oral Daily Clare L Dagnallakia, PA-C      menthol-zinc oxide   Topical BID Clare LAKIA Brennan, PA-C      nicotine polacrilex  4 mg Oral Q2H PRN Myrna Caldwell MD      nystatin   Topical BID Clare Brennan, PA-C      PARoxetine  10 mg Oral Daily Renard Keyes MD      polyethylene glycol  17 g Oral Daily PRN Royce Bailon MD      pravastatin  40 mg Oral Daily With Dinner MAN Douglas-C      propranolol  5 mg Oral Q8H PRN Myrna Caldwell MD      risperiDONE  0.25 mg  "Oral Q4H PRN Max 6/day Myrna Caldwell MD      risperiDONE  0.5 mg Oral Q4H PRN Max 3/day Myrna Caldwell MD      risperiDONE  1 mg Oral Q2H PRN Max 3/day Myrna Caldwell MD      senna-docusate sodium  1 tablet Oral  Royce Bailon MD      traZODone  50 mg Oral Q6H PRN Max 3/day Myrna Caldwell MD         Risks / Benefits of Treatment:    Risks, benefits, and possible side effects of medications explained to patient and patient verbalizes understanding and agreement for treatment.    Subjective:    Behavior over the last 24 hours: minimal improvement.     Luba was seen for continuing care. She continues exhibiting intermittent episodes of increased tearfulness, high anxiety and restlessness, required as needed Ativan periodically. Still ruminates  about \"she is not going to get better\"  and worries she is not ambulate like before anymore. Continues verbalizing passive wish to die but denies any active plan or intent to hurt herself and she is able to CFS. She has been compliant with medication and interacting with selective peer in the unit.    Sleep: slept off and on  Appetite: fair  Medication side effects: denies   ROS: all other systems are negative except chronic back pain    Mental Status Evaluation:    Appearance:  age appropriate, casually dressed   Behavior:  cooperative   Speech:  normal rate and volume   Mood:  depressed, anxious   Affect:  constricted   Thought Process:  perseverative, negative thinking   Associations: intact associations   Thought Content:  no overt delusions, intrusive thoughts, ruminations   Perceptual Disturbances: denies auditory hallucinations when asked   Risk Potential: Suicidal ideation - None at present  Homicidal ideation - None at present  Potential for aggression - Not at present   Sensorium:  oriented to person, place, and time/date   Memory:  recent and remote memory grossly intact   Consciousness:  alert and awake   Attention/Concentration: attention span and " concentration are age appropriate   Insight:  limited   Judgment: fair   Gait/Station: uses walker   Motor Activity: no abnormal movements     Vital signs in last 24 hours:    Temp:  [97.7 °F (36.5 °C)-98.1 °F (36.7 °C)] 98.1 °F (36.7 °C)  HR:  [68-89] 68  BP: (112-116)/(59-69) 116/62  Resp:  [16-18] 16  SpO2:  [94 %-97 %] 97 %  O2 Device: None (Room air)         Laboratory results: I have personally reviewed all pertinent laboratory/tests results    Most Recent Labs:   Lab Results   Component Value Date    WBC 5.00 11/28/2024    RBC 4.16 11/28/2024    HGB 12.0 11/28/2024    HCT 37.6 11/28/2024     11/28/2024    RDW 14.3 11/28/2024    NEUTROABS 3.70 11/28/2024    SODIUM 138 11/28/2024    K 4.0 11/28/2024     11/28/2024    CO2 28 11/28/2024    BUN 21 11/28/2024    CREATININE 0.74 11/28/2024    GLUC 98 11/28/2024    CALCIUM 9.4 11/28/2024    AST 12 (L) 11/28/2024    ALT 8 11/28/2024    ALKPHOS 59 11/28/2024    TP 6.0 (L) 11/28/2024    ALB 3.7 11/28/2024    TBILI 0.74 11/28/2024    CHOLESTEROL 135 11/28/2024    HDL 58 11/28/2024    TRIG 72 11/28/2024    LDLCALC 63 11/28/2024    NONHDLC 77 11/28/2024    NKK5LLCJLQKE 0.934 11/26/2024    SYPHILISAB Non-reactive 11/28/2024       Counseling / Coordination of Care:    Patient's progress discussed with staff in treatment team meeting.  Medication changes reviewed with nursing staff.  Supportive therapy provided to patient.  Group attendance encouraged.    Renard Keyes MD 11/29/24

## 2024-11-29 NOTE — NURSING NOTE
Patient observed resting comfortably in bed. Calm and cooperative. Denies SI, HI, hallucinations, and depression. Patient reports good relief from anxiety with the earlier Atarax. Will continue to monitor and access. Q 15 minute safety checks maintained.

## 2024-11-29 NOTE — PHYSICAL THERAPY NOTE
PHYSICAL THERAPY EVALUATION  NAME:  Luba Barajas  DATE: 11/29/24    AGE:   89 y.o.  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; and on nose    Colon polyp     Depression     HTN (hypertension)     HTN (hypertension)     Hyperlipidemia     Osteopenia     Skin cancer     Squamous cell on Left cheek       Past Surgical History  Past Surgical History:   Procedure Laterality Date    COLONOSCOPY      FOOT SURGERY      toe repair right 2nd toe    JOINT REPLACEMENT Bilateral     TKR    SQUAMOUS CELL CARCINOMA EXCISION Left     Lesion removed from posterior wrist     TOTAL KNEE ARTHROPLASTY         Length Of Stay: 3  Performed at least 2 patient identifiers during session: Name and ID bracelet       11/29/24 0746   PT Last Visit   PT Visit Date 11/29/24   Note Type   Note type Evaluation   Pain Assessment   Pain Assessment Tool 0-10   Pain Score No Pain   Restrictions/Precautions   Weight Bearing Precautions Per Order No   Other Precautions Cognitive   Home Living   Type of Home Assisted living   Home Equipment Walker  (used RW at baseline)   Prior Function   Level of [place] Independent with functional mobility;Independent with ADLs;Needs assistance with IADLS   Lives With Facility staff   Receives Help From Personal care attendant   IADLs Family/Friend/Other provides transportation;Family/Friend/Other provides meals;Family/Friend/Other provides medication management   Falls in the last 6 months  0   Vocational Retired   General   Family/Caregiver Present No   Cognition   Overall Cognitive Status Impaired   Arousal/Participation Alert   Orientation Level Oriented X4   Memory Decreased recall of recent events   Following Commands Follows one step commands without difficulty   Comments at times nonsenical speech; emotional labile   RLE Assessment   RLE Assessment WFL   LLE Assessment   LLE Assessment WFL   Vision-Basic Assessment   Current Vision Wears glasses all the time   Coordination   Sensation WFL   Bed Mobility   Additional Comments pt denied dizziness with transitional movement   Transfers   Sit to Stand 6  Modified independent   Stand to Sit 6  Modified independent   Ambulation/Elevation   Gait pattern Excessively slow   Gait Assistance 6  Modified independent   Assistive Device Rolling walker   Distance 200 ft   Ambulation/Elevation Additional Comments no LOB or deficts noted   Balance   Static Sitting Normal   Dynamic Sitting Normal   Static Standing Good   Dynamic Standing Good   Ambulatory Good   Endurance Deficit   Endurance Deficit No   Activity Tolerance   Activity Tolerance Patient tolerated treatment well   Assessment   Prognosis Good   Assessment Pt is 89 y.o. female seen for PT evaluation s/p admit to [facility] on 11/[facility]/2024 w/ MDD (major depressive disorder), recurrent episode, severe (HCC). PT consulted to assess pt's functional mobility and d/c needs. Order placed for PT eval and tx, w/ up and OOB as tolerated order. Pt agreeable to PT  session upon arrival, pt found  seated EOB . Patient was independent w/ all functional mobility w/ RW.  Pt presents at OF with transfers, ambulation, and bed mobility.  From PT/mobility standpoint, recommendation at time of d/c would be anticipate no needs/resources. Upon conclusion pt seated in chair. D/c PT services at this time. Complexity: Comorbidities affecting pt's physical performance at time of assessment include: htn, anxiety, and  depression. Personal factors affecting pt at time of IE include: advanced age, depression, anxiety, and decreased cognition. Please find objective findings from PT assessment regarding body systems outlined above with impairments and limitations including decreased cognition.  Pt's clinical presentation is currently stable  seen in pt's presentation of  lack of deficits . The patient's AM-PAC Basic Mobility Inpatient Short Form Raw Score is 23. Please also refer to the recommendation of the Physical Therapist for safe discharge planning. Pt seen as a co-eval with OT due to the patient's co-morbidities and behaviors.   Barriers to Discharge None   Goals   Patient Goals to go home   Discharge Recommendation   Rehab Resource Intensity Level, PT No post-acute rehabilitation needs   AM-PAC Basic Mobility Inpatient   Turning in Flat Bed Without Bedrails 4   Lying on Back to Sitting on Edge of Flat Bed Without Bedrails 4   Moving Bed to Chair 4   Standing Up From Chair Using Arms 4   Walk in Room 4   Climb 3-5 Stairs With Railing 3   Basic Mobility Inpatient Raw Score 23   Basic Mobility Standardized Score 50.88   [facility] Highest Level Of Mobility   JH-HLM Goal 7: Walk 25 feet or more   JH-HLM Achieved 7: Walk 25 feet or more         Time In: 0740  Time Out: 0746  Total Evaluation Minutes: 9    Lorraine Michaud, PT

## 2024-11-29 NOTE — NURSING NOTE
Patient complains of pain back. Tylenol given, patient repositioned. Will continue to monitor and access. Q 15 minute safety rounds maintained.

## 2024-11-29 NOTE — NURSING NOTE
Patient awake in room.Visible on unit.Tearful at times.Able to make need known to staff. Pleasant and cooperative.Schedule PO medication administered as ordered.Denies   HI, SI. Appetite good.Continue on safety check

## 2024-11-30 LAB
ATRIAL RATE: 79 BPM
P AXIS: 71 DEGREES
PR INTERVAL: 170 MS
QRS AXIS: -39 DEGREES
QRSD INTERVAL: 84 MS
QT INTERVAL: 380 MS
QTC INTERVAL: 436 MS
T WAVE AXIS: 18 DEGREES
VENTRICULAR RATE: 79 BPM

## 2024-11-30 PROCEDURE — 93010 ELECTROCARDIOGRAM REPORT: CPT | Performed by: INTERNAL MEDICINE

## 2024-11-30 PROCEDURE — 99232 SBSQ HOSP IP/OBS MODERATE 35: CPT

## 2024-11-30 RX ADMIN — CYANOCOBALAMIN TAB 500 MCG 1000 MCG: 500 TAB at 08:51

## 2024-11-30 RX ADMIN — HYDROXYZINE HYDROCHLORIDE 10 MG: 10 TABLET, FILM COATED ORAL at 21:30

## 2024-11-30 RX ADMIN — LISINOPRIL 10 MG: 10 TABLET ORAL at 08:51

## 2024-11-30 RX ADMIN — SERTRALINE 25 MG: 25 TABLET, FILM COATED ORAL at 08:51

## 2024-11-30 RX ADMIN — HYDROXYZINE HYDROCHLORIDE 10 MG: 10 TABLET, FILM COATED ORAL at 17:24

## 2024-11-30 RX ADMIN — HYDROXYZINE HYDROCHLORIDE 10 MG: 10 TABLET, FILM COATED ORAL at 08:51

## 2024-11-30 RX ADMIN — PROPRANOLOL HYDROCHLORIDE 5 MG: 10 TABLET ORAL at 04:48

## 2024-11-30 RX ADMIN — PRAVASTATIN SODIUM 40 MG: 40 TABLET ORAL at 17:24

## 2024-11-30 RX ADMIN — Medication 2000 UNITS: at 08:51

## 2024-11-30 NOTE — PROGRESS NOTES
"Progress Note - Luba Barajas 89 y.o. female MRN: [id number]    Unit/Bed#: [facility] 208-02 Encounter: [id number]        Subjective:   Patient seen and examined at bedside after reviewing the chart and discussing the case with the caring staff.      Patient examined at bedside.  Patient denies any acute symptoms.     Physical Exam   Vitals: Blood pressure 161/75, pulse 81, temperature 98.5 °F (36.9 °C), temperature source Temporal, resp. rate 18, height 5' 2\" (1.575 m), weight 68.9 kg (152 lb), SpO2 94%.,Body mass index is 27.8 kg/m².  Constitutional: Patient in no acute distress.  HEENT: PERR, EOMI, MMM.  Cardiovascular: Normal rate and regular rhythm.    Pulmonary/Chest: Effort normal and breath sounds normal.   Abdomen: Soft, + BS, NT.    Assessment/Plan:  Luba Barajas is a(n) 89 y.o. female with MDD.     Cardiac with hx of HTN, HLD.  Continue lisinopril 10 mg daily and pravastatin 40 mg daily (rosuvastatin nonform).  Gait abnormality.  PT OT consulted.  Urinary frequency/incontinence.  Chronic and follows with GYN.  UA without evidence of UTI 11/26.  Tried medications for OAB in the past but stopped due to side effects.   Cutaneous candidiasis.  Apply nystatin powder twice daily.  Dysphagia.  SLP consulted.   Vitamin D/B12 deficiency.  Continue daily supplements.  Constipation.  Patient started on Senokot as 1 tablet daily along with MiraLAX as needed 11/28/24.    The patient was discussed with Dr. Bailon and he is in agreement with the above note.  "

## 2024-11-30 NOTE — PLAN OF CARE
Problem: DISCHARGE PLANNING  Goal: Discharge to home or other facility with appropriate resources  Description: INTERVENTIONS:  - Identify barriers to discharge w/patient and caregiver  - Arrange for needed discharge resources and transportation as appropriate  - Identify discharge learning needs (meds, wound care, etc.)  - Arrange for interpretive services to assist at discharge as needed  - Refer to Case Management Department for coordinating discharge planning if the patient needs post-hospital services based on physician/advanced practitioner order or complex needs related to functional status, cognitive ability, or social support system  11/29/2024 2257 by Dano Hanson RN  Outcome: Progressing  11/29/2024 2256 by Dano Hanson RN  Outcome: Progressing     Problem: Depression  Goal: Treatment Goal: Demonstrate behavioral control of depressive symptoms, verbalize feelings of improved mood/affect, and adopt new coping skills prior to discharge  11/29/2024 2257 by Dano Hanson RN  Outcome: Progressing  11/29/2024 2256 by Dano Hanson RN  Outcome: Progressing  Goal: Verbalize thoughts and feelings  Description: Interventions:  - Assess and re-assess patient's level of risk   - Engage patient in 1:1 interactions, daily, for a minimum of 15 minutes   - Encourage patient to express feelings, fears, frustrations, hopes   11/29/2024 2257 by Dano Hanson RN  Outcome: Progressing  11/29/2024 2256 by Dano Hanson RN  Outcome: Progressing  Goal: Refrain from harming self  Description: Interventions:  - Monitor patient closely, per order   - Supervise medication ingestion, monitor effects and side effects   11/29/2024 2257 by Dano Hanson RN  Outcome: Progressing  11/29/2024 2256 by Dano Hanson RN  Outcome: Progressing  Goal: Refrain from isolation  Description: Interventions:  - Develop a trusting relationship   - Encourage socialization    11/29/2024 2257 by Dano Hanson RN  Outcome: Progressing  11/29/2024 2256 by Dano Hanson RN  Outcome: Progressing  Goal: Refrain from self-neglect  11/29/2024 2257 by Dano Hanson RN  Outcome: Progressing  11/29/2024 2256 by Dano Hanson RN  Outcome: Progressing  Goal: Attend and participate in unit activities, including therapeutic, recreational, and educational groups  Description: Interventions:  - Provide therapeutic and educational activities daily, encourage attendance and participation, and document same in the medical record   11/29/2024 2257 by Dano Hanson RN  Outcome: Progressing  11/29/2024 2256 by Dano Hanson RN  Outcome: Progressing  Goal: Complete daily ADLs, including personal hygiene independently, as able  Description: Interventions:  - Observe, teach, and assist patient with ADLS  -  Monitor and promote a balance of rest/activity, with adequate nutrition and elimination   11/29/2024 2257 by Dano Hanson RN  Outcome: Progressing  11/29/2024 2256 by Dano Hanson RN  Outcome: Progressing     Problem: Anxiety  Goal: Anxiety is at manageable level  Description: Interventions:  - Assess and monitor patient's anxiety level.   - Monitor for signs and symptoms (heart palpitations, chest pain, shortness of breath, headaches, nausea, feeling jumpy, restlessness, irritable, apprehensive).   - Collaborate with interdisciplinary team and initiate plan and interventions as ordered.  - [place] patient to unit/surroundings  - Explain treatment plan  - Encourage participation in care  - Encourage verbalization of concerns/fears  - Identify coping mechanisms  - Assist in developing anxiety-reducing skills  - Administer/offer alternative therapies  - Limit or eliminate stimulants  11/29/2024 2257 by Dano Hanson RN  Outcome: Progressing  11/29/2024 2256 by Dano Hanson RN  Outcome: Progressing     Problem:  Nutrition/Hydration-ADULT  Goal: Nutrient/Hydration intake appropriate for improving, restoring or maintaining nutritional needs  Description: Monitor and assess patient's nutrition/hydration status for malnutrition. Collaborate with interdisciplinary team and initiate plan and interventions as ordered.  Monitor patient's weight and dietary intake as ordered or per policy. Utilize nutrition screening tool and intervene as necessary. Determine patient's food preferences and provide high-protein, high-caloric foods as appropriate.     INTERVENTIONS:  - Monitor oral intake, urinary output, labs, and treatment plans  - Assess nutrition and hydration status and recommend course of action  - Evaluate amount of meals eaten  - Assist patient with eating if necessary   - Allow adequate time for meals  - Recommend/ encourage appropriate diets, oral nutritional supplements, and vitamin/mineral supplements  - Order, calculate, and assess calorie counts as needed  - Recommend, monitor, and adjust tube feedings and TPN/PPN based on assessed needs  - Assess need for intravenous fluids  - Provide specific nutrition/hydration education as appropriate  - Include patient/family/caregiver in decisions related to nutrition  11/29/2024 2257 by Dano Hanson RN  Outcome: Progressing  11/29/2024 2256 by Dano Hanson RN  Outcome: Progressing

## 2024-11-30 NOTE — PROGRESS NOTES
Progress Note - Behavioral Health   Name: Luba Barajas 89 y.o. female I MRN: [id number]  Unit/Bed#: [facility] 208-02 I Date of Admission: 11/26/2024   Date of Service: 11/30/2024 I Hospital Day: 4     Assessment & Plan  Generalized anxiety disorder  Continue Paxil 10 mg daily  Continue Atarax 10 mg p.o. 3 times daily.  MDD (major depressive disorder), recurrent episode, severe (HCC)  Continue Zoloft 25 mg daily  HTN (hypertension)  Per medical    Planned medication and treatment changes:    All current active medications have been reviewed  Encourage group therapy, milieu therapy and occupational therapy  Behavioral Health checks for safety monitoring  Continue current medications:    Behavior over the last 24 hours: unchanged.     Luba is seen today for psychiatric follow up. Per nursing notes, visible on unit, tearful at times, pleasant and cooperative, med and meal compliant, denies SI and HI, withdrawn to room at times.  Endorses mild anxiety.  Patient remains in behavioral control.  Received propranolol 5 mg p.o. as needed for restlessness, effective.    Today Luba is calm and cooperative with assessment questions.  Endorses anxiety at times and denies depression.  Reports depression and anxiety have been slowly getting better since admission.  Appears to perseverate on medications and if she will do well when discharged.  Reassurance and supportive therapy provided.  Denies suicidal and homicidal ideations.  Denies hallucinations when asked.  Appears to get tearful and anxious at one point when discussing how she will do when discharged.  Requires some redirection.  Reports ruminating thoughts.  Contracts for safety.  Med and meal compliant.  Denies passive death wish when asked today, however will ask again tomorrow.  Denies further questions/concerns when asked, denies medication side effects.    Sleep: slept off and on  Appetite: fair  Medication side effects: No   ROS: no complaints    Mental Status  Evaluation:    Appearance:  age appropriate, casually dressed, adequate grooming, sitting on bed in no acute distress   Behavior:  cooperative, calm   Speech:  normal rate and volume, clear   Mood:  anxious   Affect:  constricted   Thought Process:  increased rate of thoughts, perseverative, negative thinking   Associations: intact associations, perseverative   Thought Content:  negative thoughts, intrusive thoughts, ruminations   Perceptual Disturbances: denies auditory hallucinations when asked, does not appear responding to internal stimuli, denies visual hallucinations when asked   Risk Potential: Suicidal ideation - None at present, contracts for safety on the unit, would talk to staff if not feeling safe on the unit  Homicidal ideation - None at present  Potential for aggression - Not at present   Sensorium:  oriented to person, place, and time/date   Memory:  recent and remote memory grossly intact   Consciousness:  alert and awake   Attention/Concentration: attention span and concentration appear shorter than expected for age   Insight:  limited   Judgment: fair   Gait/Station: Seen sitting in bed, uses walker   Motor Activity: no abnormal movements     Vital signs in last 24 hours:    Temp:  [97.6 °F (36.4 °C)-98.5 °F (36.9 °C)] 98.1 °F (36.7 °C)  HR:  [77-84] 83  BP: (122-161)/(59-89) 126/60  Resp:  [18] 18  SpO2:  [94 %-97 %] 97 %  O2 Device: None (Room air)    Laboratory results: I have personally reviewed all pertinent laboratory/tests results    Results from the past 24 hours: No results found for this or any previous visit (from the past 24 hours).    Progress Toward Goals: progressing    Assessment & Plan   Principal Problem:    MDD (major depressive disorder), recurrent episode, severe (HCC)  Active Problems:    Generalized anxiety disorder    HTN (hypertension)        Current Facility-Administered Medications   Medication Dose Route Frequency Provider Last Rate    acetaminophen  650 mg Oral Q4H PRN  Myrna Caldwell MD      acetaminophen  650 mg Oral Q4H PRN Myrna Caldwell MD      acetaminophen  975 mg Oral Q6H PRN Myrna Caldwell MD      aluminum-magnesium hydroxide-simethicone  30 mL Oral Q4H PRN Myrna Caldwell MD      Cholecalciferol  2,000 Units Oral Daily Clarestacy Brennan, PA-C      clonazePAM  0.25 mg Oral BID PRN Renard Keyes MD      cyanocobalamin  1,000 mcg Oral Daily Clare L Dagnall, PA-C      haloperidol lactate  5 mg Intramuscular Q4H PRN Max 4/day Myrna Caldwell MD      hydrOXYzine HCL  10 mg Oral TID Renard Keyes MD      hydrOXYzine HCL  25 mg Oral Q6H PRN Max 4/day Myrna Caldwell MD      hydrOXYzine HCL  50 mg Oral Q6H PRN Max 4/day Mynra Caldwell MD      lisinopril  10 mg Oral Daily Clare L Dagnall, PA-C      menthol-zinc oxide   Topical BID Clare L Dagnall, PA-C      nicotine polacrilex  4 mg Oral Q2H PRN Myrna Caldwell MD      nystatin   Topical BID Clare L Dagnall, PA-C      polyethylene glycol  17 g Oral Daily PRN Royce Bailon MD      pravastatin  40 mg Oral Daily With Dinner Clare L Anu, PA-C      propranolol  5 mg Oral Q8H PRN Myrna Caldwell MD      risperiDONE  0.25 mg Oral Q4H PRN Max 6/day Myrna Caldwell MD      risperiDONE  0.5 mg Oral Q4H PRN Max 3/day Myrna Caldwell MD      risperiDONE  1 mg Oral Q2H PRN Max 3/day Myrna Caldwell MD      senna-docusate sodium  1 tablet Oral HS Royce Bailon MD      sertraline  25 mg Oral Daily Renard Keyes MD      traZODone  50 mg Oral Q6H PRN Max 3/day Myrna Caldwell MD       Risks / Benefits of Treatment:    Risks, benefits, and possible side effects of medications explained to patient and patient verbalizes understanding and agreement for treatment.    Counseling / Coordination of Care:    Patient's progress discussed with staff in treatment team meeting.  Medication changes discussed with patient.  Medication education provided to patient.  Educated on importance of medication and treatment  compliance.  Reassurance and supportive therapy provided.  Group attendance encouraged.    Diego Chua PA-C 11/30/24

## 2024-11-30 NOTE — NURSING NOTE
Patient withdrawn to room. Complained of feeling tired this evening. Anxiety stated as mild, depression denied.  Denied suicidal /homicidal ideations.  Pleasant during assessment.  Positive for medication. No complaints voiced.  Maintained on q 15 minute safety checks.  Will continue to monitor.

## 2024-12-01 PROCEDURE — 99232 SBSQ HOSP IP/OBS MODERATE 35: CPT

## 2024-12-01 RX ADMIN — HYDROXYZINE HYDROCHLORIDE 10 MG: 10 TABLET, FILM COATED ORAL at 16:04

## 2024-12-01 RX ADMIN — Medication 2000 UNITS: at 08:12

## 2024-12-01 RX ADMIN — ANORECTAL OINTMENT: 15.7; .44; 24; 20.6 OINTMENT TOPICAL at 08:22

## 2024-12-01 RX ADMIN — HYDROXYZINE HYDROCHLORIDE 10 MG: 10 TABLET, FILM COATED ORAL at 21:41

## 2024-12-01 RX ADMIN — NYSTATIN: 100000 POWDER TOPICAL at 08:21

## 2024-12-01 RX ADMIN — HYDROXYZINE HYDROCHLORIDE 10 MG: 10 TABLET, FILM COATED ORAL at 08:12

## 2024-12-01 RX ADMIN — CYANOCOBALAMIN TAB 500 MCG 1000 MCG: 500 TAB at 08:12

## 2024-12-01 RX ADMIN — PRAVASTATIN SODIUM 40 MG: 40 TABLET ORAL at 16:04

## 2024-12-01 RX ADMIN — HYDROXYZINE HYDROCHLORIDE 50 MG: 50 TABLET, FILM COATED ORAL at 11:24

## 2024-12-01 RX ADMIN — SERTRALINE 25 MG: 25 TABLET, FILM COATED ORAL at 08:12

## 2024-12-01 RX ADMIN — LISINOPRIL 10 MG: 10 TABLET ORAL at 08:12

## 2024-12-01 NOTE — NURSING NOTE
B - Abdomen is soft and non-distended. Bowel sounds positive x4. Patient was having loose stools yesterday but reports none today. Last BM was last night (11/30/2024).  L - Lungs clear to auscultation. Denies SOB or chest pain.  E - No edema observed.  P - CRT <3 seconds. PPP.  S - Redness observed under right abdominal area. Written on medical clipboard for provider to assess. Scabbed area covered with bandaids on right ankle.

## 2024-12-01 NOTE — NURSING NOTE
Upon reassessment, patient is unsure if she is feeling relief but does appear calmer and is no longer tearful. Medication seemingly effective.

## 2024-12-01 NOTE — NURSING NOTE
Pt was withdrawn and resting in their room this evening. Pt endorsed mild anxiety, denied all other psych symptoms. Pt was med compliant except declined senakot due to loose stools earlier today. Pt was pleasant, cooperative, and med compliant. Will CTM. Q15 minute checks ongoing.

## 2024-12-01 NOTE — PROGRESS NOTES
12/01/24 1124   Stacy Anxiety Scale   Anxious Mood 4   Tension 4   Fears 4   Insomnia 0   Intellectual 2   Depressed Mood 2   Somatic Complaints: Muscular 0   Somatic Complaints: Sensory 0   Cardiovascular Symptoms 1   Respiratory Symptoms 1   Gastrointestinal Symptoms 0   Genitourinary Symptoms 0   Autonomic Symptoms 1   Behavior at Interview 1   Stacy Anxiety Score 20     50mg Atarax administered at 1124 for moderate anxiety as indicated by Stacy score.

## 2024-12-01 NOTE — NURSING NOTE
Patient appears to have slept thru the night, No acute behaviors observed or concerns voiced. Will CTM. Q15 minute patient safety checks in progress.

## 2024-12-01 NOTE — PROGRESS NOTES
Progress Note - Behavioral Health   Name: Luba Barajas 89 y.o. female I MRN: [id number]  Unit/Bed#: [facility] 208-02 I Date of Admission: 11/26/2024   Date of Service: 12/1/2024 I Hospital Day: 5     Assessment & Plan  Generalized anxiety disorder  Continue Paxil 10 mg daily  Continue Atarax 10 mg p.o. 3 times daily.  MDD (major depressive disorder), recurrent episode, severe (HCC)  Continue Zoloft 25 mg daily  HTN (hypertension)  Per medical    Planned medication and treatment changes:    All current active medications have been reviewed  Encourage group therapy, milieu therapy and occupational therapy  Behavioral Health checks for safety monitoring  Continue current medications:    Behavior over the last 24 hours: unchanged.     Luba is seen today for psychiatric follow up. Per nursing notes, withdrawn resting in room, reports mild anxiety, med and meal compliant, pleasant, appears to slept overnight without issue, reports feeling better this morning, received 50 mg Atarax p.o. as needed for anxiety, effective.  Patient remains in behavioral control.      Today Luba remains pleasant and cooperative.  She continues to exhibit negative thinking and can become tearful when fixating on something.  She called her son today and told this writer she was worried because she said that she had a good day yesterday and was not sure about today.  Patient began to get upset that she should not have said that to him as a will worry him.  Offered supportive therapy and reassurance.  Able to be redirected.  Denies suicidal homicidal ideations.  Remains anxious when thinking about the future.  Reports eating and sleeping well, and noted yesterday was a good day.  Encouraged to try to think positive.    Sleep: slept better  Appetite: fair  Medication side effects: No   ROS: no complaints    Mental Status Evaluation:    Appearance:  age appropriate, casually dressed, adequate grooming, laying in bed   Behavior:  pleasant, cooperative,  calm   Speech:  normal rate and volume, clear   Mood:  anxious   Affect:  constricted, tearful, mood-congruent   Thought Process:  increased rate of thoughts, perseverative, negative thinking   Associations: intact associations, perseverative   Thought Content:  negative thoughts, intrusive thoughts, ruminations   Perceptual Disturbances: denies auditory hallucinations when asked, does not appear responding to internal stimuli, denies visual hallucinations when asked   Risk Potential: Suicidal ideation - None at present, contracts for safety on the unit, would talk to staff if not feeling safe on the unit  Homicidal ideation - None at present  Potential for aggression - Not at present   Sensorium:  oriented to person, place, and time/date   Memory:  recent and remote memory grossly intact   Consciousness:  alert and awake   Attention/Concentration: attention span and concentration appear shorter than expected for age   Insight:  limited   Judgment: partial   Gait/Station: Seen laying in bed, uses walker   Motor Activity: no abnormal movements     Vital signs in last 24 hours:    Temp:  [97.7 °F (36.5 °C)-98.5 °F (36.9 °C)] 98.4 °F (36.9 °C)  HR:  [] 106  BP: (118-155)/(56-77) 153/77  Resp:  [18-19] 18  SpO2:  [95 %-97 %] 97 %  O2 Device: None (Room air)    Laboratory results: I have personally reviewed all pertinent laboratory/tests results    Results from the past 24 hours: No results found for this or any previous visit (from the past 24 hours).    Progress Toward Goals: progressing, tolerating medication regimen.  Reports having a good day yesterday, can fixate on things and become anxious.    Assessment & Plan   Principal Problem:    MDD (major depressive disorder), recurrent episode, severe (HCC)  Active Problems:    Generalized anxiety disorder    HTN (hypertension)        Current Facility-Administered Medications   Medication Dose Route Frequency Provider Last Rate    acetaminophen  650 mg Oral Q4H PRN  Myrna Caldwell MD      acetaminophen  650 mg Oral Q4H PRN Myrna Caldwell MD      acetaminophen  975 mg Oral Q6H PRN Myrna Caldwell MD      aluminum-magnesium hydroxide-simethicone  30 mL Oral Q4H PRN Myrna Caldwell MD      Cholecalciferol  2,000 Units Oral Daily Clare L Anu, PA-C      clonazePAM  0.25 mg Oral BID PRN Renard Keyes MD      cyanocobalamin  1,000 mcg Oral Daily Clare L Dagnall, PA-C      haloperidol lactate  5 mg Intramuscular Q4H PRN Max 4/day Myrna Caldwell MD      hydrOXYzine HCL  10 mg Oral TID Renard Keyes MD      hydrOXYzine HCL  25 mg Oral Q6H PRN Max 4/day Myrna Caldwell MD      hydrOXYzine HCL  50 mg Oral Q6H PRN Max 4/day Myrna Caldwell MD      lisinopril  10 mg Oral Daily Clare L Dagnall, PA-C      menthol-zinc oxide   Topical BID Clare L Dagnall, PA-C      nicotine polacrilex  4 mg Oral Q2H PRN Myrna Caldwell MD      nystatin   Topical BID Clare L Dagnall, PA-C      polyethylene glycol  17 g Oral Daily PRN Royce Bailon MD      pravastatin  40 mg Oral Daily With Dinner Clare L Anu, PA-C      propranolol  5 mg Oral Q8H PRN Myrna Caldwell MD      risperiDONE  0.25 mg Oral Q4H PRN Max 6/day Myrna Caldwell MD      risperiDONE  0.5 mg Oral Q4H PRN Max 3/day Myrna Caldwell MD      risperiDONE  1 mg Oral Q2H PRN Max 3/day Myrna Caldwell MD      senna-docusate sodium  1 tablet Oral HS Royce Bailon MD      sertraline  25 mg Oral Daily Renard Keyes MD      traZODone  50 mg Oral Q6H PRN Max 3/day Myrna Caldwell MD       Risks / Benefits of Treatment:    Risks, benefits, and possible side effects of medications explained to patient and patient verbalizes understanding and agreement for treatment.    Counseling / Coordination of Care:    Patient's progress discussed with staff in treatment team meeting.  Medication education provided to patient.  Educated on importance of medication and treatment compliance.  Reassurance and supportive therapy  provided.  Group attendance encouraged.    Diego Chua PA-C 12/01/24

## 2024-12-01 NOTE — PLAN OF CARE
Problem: Ineffective Coping  Goal: Understands least restrictive measures  Description: Interventions:  - Utilize least restrictive behavior  Outcome: Progressing  Goal: Free from restraint events  Description: - Utilize least restrictive measures   - Provide behavioral interventions   - Redirect inappropriate behaviors   Outcome: Progressing     Problem: Depression  Goal: Refrain from harming self  Description: Interventions:  - Monitor patient closely, per order   - Supervise medication ingestion, monitor effects and side effects   Outcome: Progressing

## 2024-12-01 NOTE — NURSING NOTE
"Patient medication and meal compliant. Tearful in morning stating she was afraid because her memory is failing and she is trying to remember who she was \"before this\" Patient states she is feeling better at this time and is out of room with walker on unit. Safety  checks maintained.  "

## 2024-12-01 NOTE — PROGRESS NOTES
"Progress Note - Luba Barajas 89 y.o. female MRN: [id number]    Unit/Bed#: [facility] 208-02 Encounter: [id number]        Subjective:   Patient seen and examined at bedside after reviewing the chart and discussing the case with the caring staff.      Patient examined at bedside.  Patient denies any acute symptoms.  Patient tearful and anxious today.     Physical Exam   Vitals: Blood pressure 155/77, pulse 74, temperature 97.7 °F (36.5 °C), temperature source Temporal, resp. rate 19, height 5' 2\" (1.575 m), weight 68.9 kg (152 lb), SpO2 97%.,Body mass index is 27.8 kg/m².  Constitutional: Patient in no acute distress.  HEENT: PERR, EOMI, MMM.  Cardiovascular: Normal rate and regular rhythm.    Pulmonary/Chest: Effort normal and breath sounds normal.   Abdomen: Soft, + BS, NT.    Assessment/Plan:  Luba Barajas is a(n) 89 y.o. female with MDD.     Cardiac with hx of HTN, HLD.  Continue lisinopril 10 mg daily and pravastatin 40 mg daily (rosuvastatin nonform).  Gait abnormality.  PT OT consulted.  Urinary frequency/incontinence.  Chronic and follows with GYN.  UA without evidence of UTI 11/26.  Tried medications for OAB in the past but stopped due to side effects.   Cutaneous candidiasis.  Apply nystatin powder twice daily.  Dysphagia.  SLP consulted.   Vitamin D/B12 deficiency.  Continue daily supplements.  Constipation.  Patient started on Senokot as 1 tablet daily along with MiraLAX as needed 11/28/24.    The patient was discussed with Dr. Bailon and he is in agreement with the above note.  "

## 2024-12-02 PROCEDURE — 99232 SBSQ HOSP IP/OBS MODERATE 35: CPT | Performed by: PSYCHIATRY & NEUROLOGY

## 2024-12-02 RX ADMIN — HYDROXYZINE HYDROCHLORIDE 10 MG: 10 TABLET, FILM COATED ORAL at 08:09

## 2024-12-02 RX ADMIN — LISINOPRIL 10 MG: 10 TABLET ORAL at 08:10

## 2024-12-02 RX ADMIN — PRAVASTATIN SODIUM 40 MG: 40 TABLET ORAL at 17:14

## 2024-12-02 RX ADMIN — HYDROXYZINE HYDROCHLORIDE 10 MG: 10 TABLET, FILM COATED ORAL at 22:04

## 2024-12-02 RX ADMIN — SERTRALINE 25 MG: 25 TABLET, FILM COATED ORAL at 08:10

## 2024-12-02 RX ADMIN — HYDROXYZINE HYDROCHLORIDE 25 MG: 25 TABLET ORAL at 12:04

## 2024-12-02 RX ADMIN — CYANOCOBALAMIN TAB 500 MCG 1000 MCG: 500 TAB at 08:10

## 2024-12-02 RX ADMIN — HYDROXYZINE HYDROCHLORIDE 10 MG: 10 TABLET, FILM COATED ORAL at 17:13

## 2024-12-02 RX ADMIN — Medication 2000 UNITS: at 08:09

## 2024-12-02 NOTE — CASE MANAGEMENT
12/02/24    Team Meeting   Meeting Type Daily Rounds   Team Members Present   Team Members Present Physician;Nurse;;Occupational Therapist   Physician Team Member Stephy SORENSON, Adrien NEAL   Nursing Team Member Mahin LEÓN   Social Work Team Member Gisela URIOSTEGUI   OT Team Member Joshua   Patient/Family Present   Patient Present No   Patient's Family Present No   201 , slept endorsed moderate anxiety and depression denies SI, HI, AVH , pleasant med complaint .

## 2024-12-02 NOTE — NURSING NOTE
Patient appears to have slept thru the night, No acute behaviors observed or concerns voiced. Will CTM. Q15 patient safety checks in progress.

## 2024-12-02 NOTE — NURSING NOTE
Patient is sitting in the hallway very anxious and shaking. Patient is scared her family wont accept her like this. Patient is nervous about not ever being able to leave and was reassured that this is a temporary place. Patient had a cook score of 20 and received atarax 25 mg. Patient is going to try to eat lunch with peers and was encouraged to do so. Continuous safety monitoring in place.

## 2024-12-02 NOTE — PROGRESS NOTES
"Progress Note - Luba Barajas 89 y.o. female MRN: [id number]    Unit/Bed#: [facility] 208-02 Encounter: [id number]        Subjective:   Patient seen and examined at bedside after reviewing the chart and discussing the case with the caring staff.      Patient examined at bedside.  Patient denies any acute symptoms.     Physical Exam   Vitals: Blood pressure 125/77, pulse (!) 110, temperature 98.5 °F (36.9 °C), temperature source Temporal, resp. rate 17, height 5' 2\" (1.575 m), weight 68.9 kg (152 lb), SpO2 93%.,Body mass index is 27.8 kg/m².  Constitutional: Patient in no acute distress.  HEENT: PERR, EOMI, MMM.  Cardiovascular: Normal rate and regular rhythm.    Pulmonary/Chest: Effort normal and breath sounds normal.   Abdomen: Soft, + BS, NT.    Assessment/Plan:  Luba Barajas is a(n) 89 y.o. female with MDD.     Cardiac with hx of HTN, HLD.  Continue lisinopril 10 mg daily and pravastatin 40 mg daily (rosuvastatin nonform).  Gait abnormality.  PT OT consulted.  Urinary frequency/incontinence.  Chronic and follows with GYN.  UA without evidence of UTI 11/26.  Tried medications for OAB in the past but stopped due to side effects.   Cutaneous candidiasis.  Apply nystatin powder twice daily.  Dysphagia.  SLP consulted.   Vitamin D/B12 deficiency.  Continue daily supplements.  Constipation.  Patient started on Senokot as 1 tablet daily along with MiraLAX as needed 11/28/24.  "

## 2024-12-02 NOTE — CASE MANAGEMENT
"DIMITRI spoke with patient's son, Eh, [phone] for family notification. The son stated that the patient had been doing well since her last hospitalization 10 years ago, but became anxious last month when medical medications were changed. Patient responded by drinking excessive amounts of water to \"flush it out\" of her system. As a result, her electrolytes dropped requiring hospitalization. The son stated that this occurred prior to her move to the [facility].   He was supportive of her overall care and was in agreement with current treatment plan. The son stated that he or a family member will be able to provide transportation at the time of d/c. Relevant phone numbers were provided. He had no further questions or concerns. The call ended mutually.    DIMITRI spoke with patient's psychiatric provider staff at [facility] office: [phone]. Patient's next scheduled appt is 12/27/24 at 0840 with Dr. Negron. Appt. Staff informed of patient's request for therapy as well. She will forward to their internal CM dept who will f/u with request post d/c.  Psychiatric med mgmt appt will be kept unless rescheduling required due to continued stay.     DIMITRI spoke with patient's PCP office: [phone] for admission notification. Patient has no currently scheduled f/u appt. Office will schedule a ABELINO appt with d/c notification.           "

## 2024-12-02 NOTE — NURSING NOTE
Patient appears very nervous this morning and shaking. Patient stated that the shaking is from being nervous. Patient does not know why she feels this way. She also stated that she feels scared for no reason. Patient is also scared she is going to fall,even though she stated it has been a while since she last fell. Patient endorsed moderate anxiety and depression and wonders if she will ever get better. No other acute behaviors noted. Continuous safety monitoring in place.

## 2024-12-02 NOTE — PLAN OF CARE
Problem: Ineffective Coping  Goal: Participates in unit activities  Description: Interventions:  - Provide therapeutic environment   - Provide required programming   - Redirect inappropriate behaviors   Outcome: Progressing     Problem: Depression  Goal: Attend and participate in unit activities, including therapeutic, recreational, and educational groups  Description: Interventions:  - Provide therapeutic and educational activities daily, encourage attendance and participation, and document same in the medical record   Outcome: Progressing   Patient out for groups and is interactive with staff and peers.

## 2024-12-02 NOTE — NURSING NOTE
Pt was mostly withdrawn to room this evening, social w/ roommate. Pt stated she was ok but tired initially. Then when going back to her room for medication administration pt stated she didn't know who I was and was afraid. Much reassurance provided. Pt appears to be scared because her memory is getting worse. Pt was cooperative and med compliant. Will CTM. Q15 minute pt safety checks ongoing.

## 2024-12-02 NOTE — PROGRESS NOTES
Progress Note - Behavioral Health   Name: Luba Barajas 89 y.o. female I MRN: [id number]   Unit/Bed#: [facility] 208-02 I Date of Admission: 11/26/2024   Date of Service: 12/2/2024 I Hospital Day: 6         Assessment & Plan  Generalized anxiety disorder    MDD (major depressive disorder), recurrent episode, severe (HCC)    HTN (hypertension)        Recommended Treatment:     Planned medication and treatment changes:    All current active medications have been reviewed  Encourage group therapy, milieu therapy and occupational therapy  Behavioral Health checks for safety monitoring  Increase Zoloft to 50 mg po daily.  Monitor behavior and fall risk.    Current medications:  Current Facility-Administered Medications   Medication Dose Route Frequency Provider Last Rate    acetaminophen  650 mg Oral Q4H PRN Myrna Caldwell MD      acetaminophen  650 mg Oral Q4H PRN Myrna Caldwell MD      acetaminophen  975 mg Oral Q6H PRN Myrna Caldwell MD      aluminum-magnesium hydroxide-simethicone  30 mL Oral Q4H PRN Myrna Caldwell MD      Cholecalciferol  2,000 Units Oral Daily Clare L Jonesnall, PA-C      clonazePAM  0.25 mg Oral BID PRN Renard Keyes MD      cyanocobalamin  1,000 mcg Oral Daily Clare L Dagavelino, PA-C      haloperidol lactate  5 mg Intramuscular Q4H PRN Max 4/day Myrna Caldwell MD      hydrOXYzine HCL  10 mg Oral TID Renard Keyes MD      hydrOXYzine HCL  25 mg Oral Q6H PRN Max 4/day Myrna Caldwell MD      hydrOXYzine HCL  50 mg Oral Q6H PRN Max 4/day Myrna Caldwell MD      lisinopril  10 mg Oral Daily Clare L Dagnall, PA-C      menthol-zinc oxide   Topical BID Clare L Dagnall, PA-C      nicotine polacrilex  4 mg Oral Q2H PRN Myrna Caldwell MD      nystatin   Topical BID Clare L Dagnall, PA-C      polyethylene glycol  17 g Oral Daily PRN Royce Bailon MD      pravastatin  40 mg Oral Daily With Dinner Clare L Anu, PA-C      propranolol  5 mg Oral Q8H PRN Myrna Caldwell MD       "risperiDONE  0.25 mg Oral Q4H PRN Max 6/day Myrna Caldwell MD      risperiDONE  0.5 mg Oral Q4H PRN Max 3/day Myrna Caldwell MD      risperiDONE  1 mg Oral Q2H PRN Max 3/day Myrna Caldwell MD      senna-docusate sodium  1 tablet Oral  Royce Bailon MD      sertraline  25 mg Oral Daily Renard Keyes MD      traZODone  50 mg Oral Q6H PRN Max 3/day Myrna Caldwell MD         Risks / Benefits of Treatment:    Risks, benefits, and possible side effects of medications explained to patient and patient verbalizes understanding and agreement for treatment.    Subjective:    Behavior over the last 24 hours: some improvement.     Luba was seen for continuing care. She reports doing \"okay\" this morning but states she is very anxious and worries constantly. Still ruminates  and perseverates if she going to get better soon. Denies any passive wish to die bur stays she does not feels safe returning home. Calorie intake has been stable. She has been compliant with medication and interacting with selective peer in the unit. Staff reports some participation in groups and milieu.    Sleep: improving  Appetite: fair  Medication side effects: denies   ROS: all other systems are negative except chronic back pain    Mental Status Evaluation:    Appearance:  age appropriate, casually dressed   Behavior:  cooperative, responds to redirection   Speech:  normal rate and volume   Mood:  dysphoric, anxious   Affect:  mood-congruent   Thought Process:  perseverative, negative thinking   Associations: intact associations   Thought Content:  no overt delusions, ruminations   Perceptual Disturbances: denies auditory hallucinations when asked   Risk Potential: Suicidal ideation - None at present  Homicidal ideation - None at present  Potential for aggression - Not at present   Sensorium:  oriented to person, place, and time/date   Memory:  recent and remote memory grossly intact   Consciousness:  alert and awake "   Attention/Concentration: attention span and concentration are age appropriate   Insight:  limited   Judgment: fair   Gait/Station: uses walker   Motor Activity: no abnormal movements     Vital signs in last 24 hours:    Temp:  [97.8 °F (36.6 °C)-98.5 °F (36.9 °C)] 98.5 °F (36.9 °C)  HR:  [] 110  BP: (125-153)/(59-77) 125/77  Resp:  [17-18] 17  SpO2:  [93 %-97 %] 93 %         Laboratory results: I have personally reviewed all pertinent laboratory/tests results    Most Recent Labs:   Lab Results   Component Value Date    WBC 5.00 11/28/2024    RBC 4.16 11/28/2024    HGB 12.0 11/28/2024    HCT 37.6 11/28/2024     11/28/2024    RDW 14.3 11/28/2024    NEUTROABS 3.70 11/28/2024    SODIUM 138 11/28/2024    K 4.0 11/28/2024     11/28/2024    CO2 28 11/28/2024    BUN 21 11/28/2024    CREATININE 0.74 11/28/2024    GLUC 98 11/28/2024    CALCIUM 9.4 11/28/2024    AST 12 (L) 11/28/2024    ALT 8 11/28/2024    ALKPHOS 59 11/28/2024    TP 6.0 (L) 11/28/2024    ALB 3.7 11/28/2024    TBILI 0.74 11/28/2024    CHOLESTEROL 135 11/28/2024    HDL 58 11/28/2024    TRIG 72 11/28/2024    LDLCALC 63 11/28/2024    NONHDLC 77 11/28/2024    ZHT1ZNPFMQND 0.934 11/26/2024    SYPHILISAB Non-reactive 11/28/2024       Counseling / Coordination of Care:    Patient's progress discussed with staff in treatment team meeting.  Medication changes reviewed with nursing staff.  Supportive therapy provided to patient.  Group attendance encouraged.    Renard Keyes MD 12/02/24

## 2024-12-03 PROCEDURE — 99232 SBSQ HOSP IP/OBS MODERATE 35: CPT | Performed by: PSYCHIATRY & NEUROLOGY

## 2024-12-03 RX ORDER — ECHINACEA PURPUREA EXTRACT 125 MG
2 TABLET ORAL
Status: DISCONTINUED | OUTPATIENT
Start: 2024-12-03 | End: 2024-12-23 | Stop reason: HOSPADM

## 2024-12-03 RX ORDER — HYDROXYZINE HYDROCHLORIDE 25 MG/1
25 TABLET, FILM COATED ORAL 3 TIMES DAILY
Status: DISCONTINUED | OUTPATIENT
Start: 2024-12-03 | End: 2024-12-11

## 2024-12-03 RX ADMIN — SENNOSIDES AND DOCUSATE SODIUM 1 TABLET: 8.6; 5 TABLET ORAL at 21:02

## 2024-12-03 RX ADMIN — Medication 2000 UNITS: at 08:17

## 2024-12-03 RX ADMIN — ALUMINUM HYDROXIDE, MAGNESIUM HYDROXIDE, AND DIMETHICONE 30 ML: 200; 20; 200 SUSPENSION ORAL at 06:10

## 2024-12-03 RX ADMIN — SERTRALINE HYDROCHLORIDE 50 MG: 50 TABLET ORAL at 08:17

## 2024-12-03 RX ADMIN — PRAVASTATIN SODIUM 40 MG: 40 TABLET ORAL at 16:53

## 2024-12-03 RX ADMIN — HYDROXYZINE HYDROCHLORIDE 50 MG: 50 TABLET, FILM COATED ORAL at 02:00

## 2024-12-03 RX ADMIN — CYANOCOBALAMIN TAB 500 MCG 1000 MCG: 500 TAB at 08:17

## 2024-12-03 RX ADMIN — LISINOPRIL 10 MG: 10 TABLET ORAL at 08:17

## 2024-12-03 RX ADMIN — NYSTATIN: 100000 POWDER TOPICAL at 21:03

## 2024-12-03 RX ADMIN — HYDROXYZINE HYDROCHLORIDE 25 MG: 25 TABLET ORAL at 16:53

## 2024-12-03 RX ADMIN — HYDROXYZINE HYDROCHLORIDE 25 MG: 25 TABLET ORAL at 21:02

## 2024-12-03 RX ADMIN — HYDROXYZINE HYDROCHLORIDE 10 MG: 10 TABLET, FILM COATED ORAL at 08:17

## 2024-12-03 RX ADMIN — ANORECTAL OINTMENT: 15.7; .44; 24; 20.6 OINTMENT TOPICAL at 21:03

## 2024-12-03 NOTE — PROGRESS NOTES
"Progress Note - Luba Barajas 89 y.o. female MRN: [id number]    Unit/Bed#: [facility] 208-02 Encounter: [id number]        Subjective:   Patient seen and examined at bedside after reviewing the chart and discussing the case with the caring staff.      Patient examined at bedside.  Patient complaining of dry nose and is requesting nasal spray.  She also states her throat feels scratchy which she attributes to the dryness.  Patient denies any other complaints at this time.    Physical Exam   Vitals: Blood pressure 150/85, pulse 89, temperature 98.1 °F (36.7 °C), temperature source Temporal, resp. rate 18, height 5' 2\" (1.575 m), weight 68.9 kg (152 lb), SpO2 98%.,Body mass index is 27.8 kg/m².  Constitutional: Patient in no acute distress.  HEENT: PERR, EOMI, MMM.  Cardiovascular: Normal rate and regular rhythm.    Pulmonary/Chest: Effort normal and breath sounds normal.   Abdomen: Soft, + BS, NT.    Assessment/Plan:  Luba Barajas is a(n) 89 y.o. female with MDD.     Cardiac with hx of HTN, HLD.  Continue lisinopril 10 mg daily and pravastatin 40 mg daily (rosuvastatin nonform).  Gait abnormality.  PT OT consulted.  Urinary frequency/incontinence.  Chronic and follows with GYN.  UA without evidence of UTI 11/26.  Tried medications for OAB in the past but stopped due to side effects.   Cutaneous candidiasis.  Apply nystatin powder twice daily.  Dysphagia.  SLP consulted.   Vitamin D/B12 deficiency.  Continue daily supplements.  Constipation.  Patient started on Senokot as 1 tablet daily along with MiraLAX as needed 11/28/24.  Dry nares.  Add saline nasal spray as needed 12/3.     The patient was discussed with Dr. Bailon and he is in agreement with the above note.  "

## 2024-12-03 NOTE — NURSING NOTE
Observed during safety checks pt having difficulty staying asleep. No sign of labored breathing. Will continue to monitor.

## 2024-12-03 NOTE — TREATMENT TEAM
12/03/24 0203   Stacy Anxiety Scale   Anxious Mood 2   Tension 2   Fears 2   Insomnia 2   Intellectual 2   Depressed Mood 2   Somatic Complaints: Muscular 1   Somatic Complaints: Sensory 1   Cardiovascular Symptoms 1   Respiratory Symptoms 1   Gastrointestinal Symptoms 1   Genitourinary Symptoms 1   Autonomic Symptoms 1   Behavior at Interview 1   Stacy Anxiety Score 20     50 mg Atarax administered as ordered per pt's request for moderate anxiety. Pt stated feeling anxious and having trouble relaxing. Observed tearful at times. Will continue to monitor. Safety checks continued.

## 2024-12-03 NOTE — CASE MANAGEMENT
12/03/24   Team Meeting   Meeting Type Daily Rounds   Team Members Present   Team Members Present Physician;;Nurse;Occupational Therapist   Physician Team Member Stephy SORENSON, Adrien NEAL ,   Nursing Team Member ROMELIA Stockton   Social Work Team Member MODOY Higgins   OT Team Member Barrett LEWIS   Patient/Family Present   Patient Present No   Patient's Family Present No   201 , Denies SI , HI, AVH endorsed anxiety , social med complaint .

## 2024-12-03 NOTE — PROGRESS NOTES
Progress Note - Behavioral Health   Name: Luba Barajas 89 y.o. female I MRN: [id number]   Unit/Bed#: [facility] 208-02 I Date of Admission: 11/26/2024   Date of Service: 12/3/2024 I Hospital Day: 7         Assessment & Plan  Generalized anxiety disorder    MDD (major depressive disorder), recurrent episode, severe (HCC)    HTN (hypertension)        Recommended Treatment:     Planned medication and treatment changes:    All current active medications have been reviewed  Encourage group therapy, milieu therapy and occupational therapy  Behavioral Health checks for safety monitoring  Increase Atarax 25 mg po tid.  Monitor behavior and fall risk.    Current medications:  Current Facility-Administered Medications   Medication Dose Route Frequency Provider Last Rate    acetaminophen  650 mg Oral Q4H PRN Myrna Caldwell MD      acetaminophen  650 mg Oral Q4H PRN Myrna Caldwell MD      acetaminophen  975 mg Oral Q6H PRN Myrna Caldwell MD      aluminum-magnesium hydroxide-simethicone  30 mL Oral Q4H PRN Myrna Caldwell MD      Cholecalciferol  2,000 Units Oral Daily Clare L Dagnall, PA-C      clonazePAM  0.25 mg Oral BID PRN Renard Keyes MD      cyanocobalamin  1,000 mcg Oral Daily Clare L Dagavelino, PA-C      haloperidol lactate  5 mg Intramuscular Q4H PRN Max 4/day Myrna Caldwell MD      hydrOXYzine HCL  10 mg Oral TID Renard Keyes MD      hydrOXYzine HCL  25 mg Oral Q6H PRN Max 4/day Myrna Caldwell MD      hydrOXYzine HCL  50 mg Oral Q6H PRN Max 4/day Myrna Caldwell MD      lisinopril  10 mg Oral Daily Clare L Dagnall, PA-C      menthol-zinc oxide   Topical BID Clare L Dagnall, PA-C      nicotine polacrilex  4 mg Oral Q2H PRN Myrna Caldwell MD      nystatin   Topical BID Clare L Dagnall, PA-C      polyethylene glycol  17 g Oral Daily PRN Royce Bailon MD      pravastatin  40 mg Oral Daily With Dinner Clare L Dagnallakia, PA-C      propranolol  5 mg Oral Q8H PRN Myrna Caldwell MD      risperiDONE   0.25 mg Oral Q4H PRN Max 6/day Myrna Caldwell MD      risperiDONE  0.5 mg Oral Q4H PRN Max 3/day Myrna Caldwell MD      risperiDONE  1 mg Oral Q2H PRN Max 3/day Myrna Caldwell MD      senna-docusate sodium  1 tablet Oral HS Royce Bailon MD      sertraline  50 mg Oral Daily Renard Keyes MD      sodium chloride  2 spray Each Nare Q1H PRN Clare Brennan PA-C      traZODone  50 mg Oral Q6H PRN Max 3/day Myrna Caldwell MD         Risks / Benefits of Treatment:    Risks, benefits, and possible side effects of medications explained to patient and patient verbalizes understanding and agreement for treatment.    Subjective:    Behavior over the last 24 hours: unchanged.     Luba was seen for continuing care. She reports feeling anxious and restless and ruminates about her physical decline. She becomes easily tearful, restless and continues requiring  Atarax prn periodically. States has been interacting with selective peers but still feels hopeless when she is alone in her room.. Denies any SI but does not feels safe returning home. She has been compliant with medication and interacting with selective peer in the unit. Insight into her physical and cognitive function decline are very limited. Staff reports some participation in groups and milieu.    Sleep: improving  Appetite: fair  Medication side effects: denies   ROS: all other systems are negative except chronic back pain    Mental Status Evaluation:    Appearance:  age appropriate, casually dressed   Behavior:  responds to redirection   Speech:  normal rate and volume   Mood:  anxious   Affect:  mood-congruent   Thought Process:  perseverative, negative thinking   Associations: intact associations   Thought Content:  no overt delusions, ruminations   Perceptual Disturbances: none   Risk Potential: Suicidal ideation - None at present  Homicidal ideation - None at present  Potential for aggression - Not at present   Sensorium:  oriented to person, place,  and time/date   Memory:  recent and remote memory grossly intact   Consciousness:  alert and awake   Attention/Concentration: attention span and concentration are age appropriate   Insight:  limited   Judgment: fair   Gait/Station: uses walker   Motor Activity: no abnormal movements     Vital signs in last 24 hours:    Temp:  [97.1 °F (36.2 °C)-98.1 °F (36.7 °C)] 98.1 °F (36.7 °C)  HR:  [89-99] 89  BP: (141-165)/(76-85) 150/85  Resp:  [18] 18  SpO2:  [94 %-98 %] 98 %  O2 Device: None (Room air)         Laboratory results: I have personally reviewed all pertinent laboratory/tests results    Most Recent Labs:   Lab Results   Component Value Date    WBC 5.00 11/28/2024    RBC 4.16 11/28/2024    HGB 12.0 11/28/2024    HCT 37.6 11/28/2024     11/28/2024    RDW 14.3 11/28/2024    NEUTROABS 3.70 11/28/2024    SODIUM 138 11/28/2024    K 4.0 11/28/2024     11/28/2024    CO2 28 11/28/2024    BUN 21 11/28/2024    CREATININE 0.74 11/28/2024    GLUC 98 11/28/2024    CALCIUM 9.4 11/28/2024    AST 12 (L) 11/28/2024    ALT 8 11/28/2024    ALKPHOS 59 11/28/2024    TP 6.0 (L) 11/28/2024    ALB 3.7 11/28/2024    TBILI 0.74 11/28/2024    CHOLESTEROL 135 11/28/2024    HDL 58 11/28/2024    TRIG 72 11/28/2024    LDLCALC 63 11/28/2024    NONHDLC 77 11/28/2024    EUK0AQNQQRGO 0.934 11/26/2024    SYPHILISAB Non-reactive 11/28/2024       Counseling / Coordination of Care:    Patient's progress discussed with staff in treatment team meeting.  Medication changes reviewed with nursing staff.  Supportive therapy provided to patient.  Group attendance encouraged.    Renard Keyes MD 12/03/24

## 2024-12-03 NOTE — NURSING NOTE
Follow up Atarax 50 mg, effective. Pt observed resting in bed. No sign of pain or discomfort. Breathing calm and even. No SOB or labored breathing. Will continue to monitor. Safety checks continued.

## 2024-12-03 NOTE — PLAN OF CARE
Problem: SAFETY ADULT  Goal: Patient will remain free of falls  Description: INTERVENTIONS:  - Educate patient/family on patient safety including physical limitations  - Instruct patient to call for assistance with activity   - Consult OT/PT to assist with strengthening/mobility   - Keep Call bell within reach  - Keep bed low and locked with side rails adjusted as appropriate  - Keep care items and personal belongings within reach  - Initiate and maintain comfort rounds  - Make Fall Risk Sign visible to staff  - Apply yellow socks and bracelet for high fall risk patients  - Consider moving patient to room near nurses station  Outcome: Progressing     Problem: Depression  Goal: Refrain from self-neglect  Outcome: Progressing

## 2024-12-03 NOTE — NURSING NOTE
"Patient appears restless and anxious, cooperative with staff during conversation.Denies SI/HI or AVH. Patient reports anxiety related to \"not being able to get dressed myself.\" Help offered and patient able to change with assistance. Patient visible shaky and tremors in hands noted. Patient compliant during AM medication pass and asking appropriate questions about care. Will continue to monitor.C Continual q15 min rounding and safety checks in place.   "

## 2024-12-03 NOTE — NURSING NOTE
Pt was withdrawn to her room, compliant with medication. Cooperative with staff during care. Endorsed mild to moderate anxiety and depression. Made no c/o SI, HI, or AVH. Pt was observed to be tearful at times. Eye contact was good. Speech pattern was rambling. Made no c/o pain or discomfort. Appearance and hygiene was unremarkable. Made no c/o pain or discomfort. Safety checks continued.

## 2024-12-04 PROCEDURE — 99232 SBSQ HOSP IP/OBS MODERATE 35: CPT | Performed by: PSYCHIATRY & NEUROLOGY

## 2024-12-04 PROCEDURE — 92526 ORAL FUNCTION THERAPY: CPT

## 2024-12-04 RX ADMIN — ANORECTAL OINTMENT: 15.7; .44; 24; 20.6 OINTMENT TOPICAL at 21:12

## 2024-12-04 RX ADMIN — HYDROXYZINE HYDROCHLORIDE 25 MG: 25 TABLET ORAL at 16:20

## 2024-12-04 RX ADMIN — HYDROXYZINE HYDROCHLORIDE 25 MG: 25 TABLET ORAL at 08:16

## 2024-12-04 RX ADMIN — NYSTATIN: 100000 POWDER TOPICAL at 21:12

## 2024-12-04 RX ADMIN — CYANOCOBALAMIN TAB 500 MCG 1000 MCG: 500 TAB at 08:15

## 2024-12-04 RX ADMIN — HYDROXYZINE HYDROCHLORIDE 25 MG: 25 TABLET ORAL at 21:12

## 2024-12-04 RX ADMIN — LISINOPRIL 10 MG: 10 TABLET ORAL at 08:15

## 2024-12-04 RX ADMIN — Medication 2000 UNITS: at 08:15

## 2024-12-04 RX ADMIN — SERTRALINE HYDROCHLORIDE 50 MG: 50 TABLET ORAL at 08:16

## 2024-12-04 RX ADMIN — SENNOSIDES AND DOCUSATE SODIUM 1 TABLET: 8.6; 5 TABLET ORAL at 21:12

## 2024-12-04 RX ADMIN — PRAVASTATIN SODIUM 40 MG: 40 TABLET ORAL at 16:20

## 2024-12-04 NOTE — PROGRESS NOTES
"Progress Note - Luba Barajas 89 y.o. female MRN: [id number]    Unit/Bed#: [facility] 208-02 Encounter: [id number]        Subjective:   Patient seen and examined at bedside after reviewing the chart and discussing the case with the caring staff.      Patient examined at bedside.  Patient denies any acute symptoms.      Physical Exam   Vitals: Blood pressure (!) 149/105, pulse 103, temperature 98.4 °F (36.9 °C), temperature source Temporal, resp. rate 18, height 5' 2\" (1.575 m), weight 68.9 kg (152 lb), SpO2 95%.,Body mass index is 27.8 kg/m².  Constitutional: Patient in no acute distress.  HEENT: PERR, EOMI, MMM.  Cardiovascular: Normal rate and regular rhythm.    Pulmonary/Chest: Effort normal and breath sounds normal.   Abdomen: Soft, + BS, NT.    Assessment/Plan:  Luba Barajas is a(n) 89 y.o. female with MDD.     Cardiac with hx of HTN, HLD.  Continue lisinopril 10 mg daily and pravastatin 40 mg daily (rosuvastatin nonform).  Gait abnormality.  PT OT consulted.  Urinary frequency/incontinence.  Chronic and follows with GYN.  UA without evidence of UTI 11/26.  Tried medications for OAB in the past but stopped due to side effects.   Cutaneous candidiasis.  Apply nystatin powder twice daily.  Dysphagia.  SLP consulted.   Vitamin D/B12 deficiency.  Continue daily supplements.  Constipation.  Patient started on Senokot as 1 tablet daily along with MiraLAX as needed 11/28/24.  Dry nares.  Add saline nasal spray as needed 12/3.     The patient was discussed with Dr. Bailon and he is in agreement with the above note.  "

## 2024-12-04 NOTE — SPEECH THERAPY NOTE
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   Objective:  Pt was seen for f/u dysphagia therapy. She fed herself grilled cheese, peas, noodles, and thin liquids via cup. Mastication was functional for current diet level. Bolus control, formation, and transfer were WNL. Swallows appeared prompt. No overt s/s of aspiration. Pt denied difficulties with chewing and swallowing. Stated she didn't feel very hungry today. Independently alternated liquids with solids.   Assessment:  Pt appears to be tolerating current diet level. No overt s/s of aspiration.   Plan/Recommendations:  Recommend continue Regular with chopped meats and thin liquids   Ensure good oral care   ST will d/c as pt appears to be at baseline.

## 2024-12-04 NOTE — PLAN OF CARE
Problem: Ineffective Coping  Goal: Participates in unit activities  Description: Interventions:  - Provide therapeutic environment   - Provide required programming   - Redirect inappropriate behaviors   Outcome: Progressing     Problem: Depression  Goal: Attend and participate in unit activities, including therapeutic, recreational, and educational groups  Description: Interventions:  - Provide therapeutic and educational activities daily, encourage attendance and participation, and document same in the medical record   Outcome: Progressing   Patient out for groups and will share but remains anxious but unsure what is causing her anxiety.

## 2024-12-04 NOTE — CMS CERTIFICATION NOTE
Recertification: Based upon physical, mental and social evaluations, I certify that inpatient psychiatric services continue to be medically necessary for this patient for a duration of 30 midnights for the treatment of MDD (major depressive disorder), recurrent episode, severe (HCC) Available alternative community resources still do not meet the patient's mental health care needs. I further attest that an established written individualized plan of care has been updated and is outlined in the patient's medical records.

## 2024-12-04 NOTE — CASE MANAGEMENT
12/04/24    Team Meeting   Meeting Type Daily Rounds   Team Members Present   Team Members Present Physician;;Occupational Therapist;Nurse   Physician Team Member Stephy SORENSON , Adrien NEAL,   Nursing Team Member Mahin LEÓN   Social Work Team Member Gisela URIOSTEGUI   OT Team Member Joshua LEWIS   Patient/Family Present   Patient Present No   Patient's Family Present No   201 , poor sleep , forgetful , pleasant cooperative , Denies SI , HI , AVH and Depression endorses anxiety .  No D/C med management .

## 2024-12-04 NOTE — NURSING NOTE
"Patient visible in milieu, pleasant and cooperative in interaction. Social with staff and peers. Patient endorsed high anxiety, denied depression, SI/HI, hallucinations. Periods of tremors when talking to staff, resolved after talking with staff. Noted to have difficulty word finding with increased anxiety. Speech varied from normal/relaxed, to rambling, repetitive. Periods of ambivalence, apologetic. Patient unable to state what is causing her anxiety, frequent reassurance provided. Patient remains medication compliant and on 15\" checks for safety and behaviors.  "

## 2024-12-04 NOTE — PROGRESS NOTES
Progress Note - Behavioral Health   Name: Luba Barajas 89 y.o. female I MRN: [id number]   Unit/Bed#: [facility] 208-02 I Date of Admission: 11/26/2024   Date of Service: 12/4/2024 I Hospital Day: 8         Assessment & Plan  Generalized anxiety disorder    MDD (major depressive disorder), recurrent episode, severe (HCC)    HTN (hypertension)        Recommended Treatment:     Planned medication and treatment changes:    All current active medications have been reviewed  Encourage group therapy, milieu therapy and occupational therapy  Behavioral Health checks for safety monitoring  Increase Zoloft to 75 mg po daily.  Monitor behavior and fall risk.    Current medications:  Current Facility-Administered Medications   Medication Dose Route Frequency Provider Last Rate    acetaminophen  650 mg Oral Q4H PRN Myrna Caldwell MD      acetaminophen  650 mg Oral Q4H PRN Myrna Caldwell MD      acetaminophen  975 mg Oral Q6H PRN Myrna Caldwell MD      aluminum-magnesium hydroxide-simethicone  30 mL Oral Q4H PRN Myrna Caldwell MD      Cholecalciferol  2,000 Units Oral Daily Clare L Dagnall, PA-C      clonazePAM  0.25 mg Oral BID PRN Renard Keyes MD      cyanocobalamin  1,000 mcg Oral Daily Clare L Dagnallakia, PA-C      haloperidol lactate  5 mg Intramuscular Q4H PRN Max 4/day Myrna Caldwell MD      hydrOXYzine HCL  25 mg Oral Q6H PRN Max 4/day Myrna Caldwell MD      hydrOXYzine HCL  25 mg Oral TID Renard Keyes MD      hydrOXYzine HCL  50 mg Oral Q6H PRN Max 4/day Myrna Caldwell MD      lisinopril  10 mg Oral Daily Clare L Dagnall, PA-C      menthol-zinc oxide   Topical BID Clare L Dagnall, PA-C      nicotine polacrilex  4 mg Oral Q2H PRN Myrna Caldwell MD      nystatin   Topical BID Clare L Dagnall, PA-C      polyethylene glycol  17 g Oral Daily PRN Royce Bailon MD      pravastatin  40 mg Oral Daily With Dinner Clare L Jonesnallakia, PA-C      propranolol  5 mg Oral Q8H PRN Myrna Caldwell MD       risperiDONE  0.25 mg Oral Q4H PRN Max 6/day Myrna Caldwell MD      risperiDONE  0.5 mg Oral Q4H PRN Max 3/day Myrna Caldwell MD      risperiDONE  1 mg Oral Q2H PRN Max 3/day Myrna Caldwell MD      senna-docusate sodium  1 tablet Oral HS Royce Bailon MD      sertraline  50 mg Oral Daily Renard Keyes MD      sodium chloride  2 spray Each Nare Q1H PRN Clare Brennan PA-C      traZODone  50 mg Oral Q6H PRN Max 3/day Myrna Caldwell MD         Risks / Benefits of Treatment:    Risks, benefits, and possible side effects of medications explained to patient and patient verbalizes understanding and agreement for treatment.    Subjective:    Behavior over the last 24 hours: unchanged.     Luba was seen for continuing care. She reports feeling anxious and restless when she is by herself in her room. Admits she worries and ruminates constantly and states she is not going to get better soon. Denies any SI or wish to die bur does not feels safe returning home. She is alert and oriented X 2  and has very limited insight into her cognitive function decline. She has been compliant with medication and denies any current side effects. Staff reports some participation in groups and milieu.    Sleep: sleep on and off  Appetite: fair  Medication side effects: denies   ROS: all other systems are negative except chronic back pain    Mental Status Evaluation:    Appearance:  age appropriate, casually dressed   Behavior:  restless, responds to redirection   Speech:  normal rate and volume   Mood:  anxious   Affect:  mood-congruent   Thought Process:  perseverative, negative thinking   Associations: intact associations   Thought Content:  no overt delusions, ruminations   Perceptual Disturbances: denies auditory hallucinations when asked   Risk Potential: Suicidal ideation - None at present  Homicidal ideation - None at present  Potential for aggression - Not at present   Sensorium:  oriented to person, place, and time/date    Memory:  recent and remote memory grossly intact   Consciousness:  alert and awake   Attention/Concentration: attention span and concentration are age appropriate   Insight:  limited   Judgment: fair   Gait/Station: uses walker   Motor Activity: no abnormal movements     Vital signs in last 24 hours:    Temp:  [97.5 °F (36.4 °C)-98.4 °F (36.9 °C)] 98.4 °F (36.9 °C)  HR:  [] 103  BP: (134-149)/() 149/105  Resp:  [18] 18  SpO2:  [95 %-96 %] 95 %  O2 Device: None (Room air)         Laboratory results: I have personally reviewed all pertinent laboratory/tests results    Most Recent Labs:   Lab Results   Component Value Date    WBC 5.00 11/28/2024    RBC 4.16 11/28/2024    HGB 12.0 11/28/2024    HCT 37.6 11/28/2024     11/28/2024    RDW 14.3 11/28/2024    NEUTROABS 3.70 11/28/2024    SODIUM 138 11/28/2024    K 4.0 11/28/2024     11/28/2024    CO2 28 11/28/2024    BUN 21 11/28/2024    CREATININE 0.74 11/28/2024    GLUC 98 11/28/2024    CALCIUM 9.4 11/28/2024    AST 12 (L) 11/28/2024    ALT 8 11/28/2024    ALKPHOS 59 11/28/2024    TP 6.0 (L) 11/28/2024    ALB 3.7 11/28/2024    TBILI 0.74 11/28/2024    CHOLESTEROL 135 11/28/2024    HDL 58 11/28/2024    TRIG 72 11/28/2024    LDLCALC 63 11/28/2024    NONHDLC 77 11/28/2024    [id number] 0.934 11/26/2024    SYPHILISAB Non-reactive 11/28/2024       Counseling / Coordination of Care:    Patient's progress discussed with staff in treatment team meeting.  Medication changes reviewed with nursing staff.  Supportive therapy provided to patient.  Group attendance encouraged.    Renard Keyes MD 12/04/24

## 2024-12-04 NOTE — NURSING NOTE
Pt visible on unit and social with select peers. Pt is calm, pleasant, and cooperative. She ambulates with a walker. Pt endorsed high anxiety and mild depression. Pt verbalized she struggles with her memory and her anxiety. Pt states she can remember events from childhood but struggles to remember things that happened in the recent past. Pt reports she will ruminate over and over. Pt verbalized she thinks about why she is the way she is constantly. Pt denies SI/HI/AVH. Pt was observed to have mild tremors during conversation. Continuous monitoring maintained.

## 2024-12-04 NOTE — NURSING NOTE
Patient noted to have broken sleep throughout the night x1. Patient was up w/ c/o feeling forgetful. Non labored breathing noted while asleep. Q 15 min safety checks maintained.

## 2024-12-05 PROBLEM — G31.84 MILD NEUROCOGNITIVE DISORDER: Status: ACTIVE | Noted: 2024-12-05

## 2024-12-05 LAB
ANION GAP SERPL CALCULATED.3IONS-SCNC: 6 MMOL/L (ref 4–13)
BUN SERPL-MCNC: 18 MG/DL (ref 5–25)
CALCIUM SERPL-MCNC: 9.7 MG/DL (ref 8.4–10.2)
CHLORIDE SERPL-SCNC: 108 MMOL/L (ref 96–108)
CO2 SERPL-SCNC: 26 MMOL/L (ref 21–32)
CREAT SERPL-MCNC: 0.78 MG/DL (ref 0.6–1.3)
GFR SERPL CREATININE-BSD FRML MDRD: 68 ML/MIN/1.73SQ M
GLUCOSE P FAST SERPL-MCNC: 115 MG/DL (ref 65–99)
GLUCOSE SERPL-MCNC: 115 MG/DL (ref 65–140)
POTASSIUM SERPL-SCNC: 3.9 MMOL/L (ref 3.5–5.3)
SODIUM SERPL-SCNC: 140 MMOL/L (ref 135–147)

## 2024-12-05 PROCEDURE — 99232 SBSQ HOSP IP/OBS MODERATE 35: CPT | Performed by: PSYCHIATRY & NEUROLOGY

## 2024-12-05 PROCEDURE — 80048 BASIC METABOLIC PNL TOTAL CA: CPT

## 2024-12-05 RX ADMIN — LISINOPRIL 10 MG: 10 TABLET ORAL at 08:12

## 2024-12-05 RX ADMIN — SENNOSIDES AND DOCUSATE SODIUM 1 TABLET: 8.6; 5 TABLET ORAL at 21:34

## 2024-12-05 RX ADMIN — NYSTATIN: 100000 POWDER TOPICAL at 21:34

## 2024-12-05 RX ADMIN — SERTRALINE HYDROCHLORIDE 75 MG: 50 TABLET ORAL at 08:13

## 2024-12-05 RX ADMIN — PRAVASTATIN SODIUM 40 MG: 40 TABLET ORAL at 16:26

## 2024-12-05 RX ADMIN — HYDROXYZINE HYDROCHLORIDE 25 MG: 25 TABLET ORAL at 08:12

## 2024-12-05 RX ADMIN — HYDROXYZINE HYDROCHLORIDE 25 MG: 25 TABLET ORAL at 21:33

## 2024-12-05 RX ADMIN — HYDROXYZINE HYDROCHLORIDE 25 MG: 25 TABLET ORAL at 16:26

## 2024-12-05 RX ADMIN — Medication 2000 UNITS: at 08:12

## 2024-12-05 RX ADMIN — CYANOCOBALAMIN TAB 500 MCG 1000 MCG: 500 TAB at 08:12

## 2024-12-05 NOTE — NURSING NOTE
Luba appears to have slept through the overnight hours without issue.  No complaints voiced.  No acute behaviors observed. Patient remains in bed sleeping at this time.  Continuous safety rounding in progress.

## 2024-12-05 NOTE — PROGRESS NOTES
"Progress Note - Luba Barajas 89 y.o. female MRN: [id number]    Unit/Bed#: [facility] 208-02 Encounter: [id number]        Subjective:   Patient seen and examined at bedside after reviewing the chart and discussing the case with the caring staff.      Patient examined at bedside.  Patient denies any acute symptoms.     Physical Exam   Vitals: Blood pressure 156/76, pulse 105, temperature 97.6 °F (36.4 °C), temperature source Temporal, resp. rate 18, height 5' 2\" (1.575 m), weight 68.9 kg (152 lb), SpO2 93%.,Body mass index is 27.8 kg/m².  Constitutional: Patient in no acute distress.  HEENT: PERR, EOMI, MMM.  Cardiovascular: Normal rate and regular rhythm.  Pulmonary/Chest: Effort normal and breath sounds normal.  Abdomen: Soft, + BS, NT.    Assessment/Plan:  Luba Barajas is a(n) 89 y.o. female with MDD.     Cardiac with hx of HTN, HLD.  Continue lisinopril 10 mg daily and pravastatin 40 mg daily (rosuvastatin nonform).  Gait abnormality.  PT OT consulted.  Urinary frequency/incontinence.  Chronic and follows with GYN.  UA without evidence of UTI 11/26.  Tried medications for OAB in the past but stopped due to side effects.   Cutaneous candidiasis.  Apply nystatin powder twice daily.  Dysphagia.  SLP consulted.   Vitamin D/B12 deficiency.  Continue daily supplements.  Constipation.  Patient started on Senokot as 1 tablet daily along with MiraLAX as needed 11/28/24.  Dry nares.  Added saline nasal spray as needed 12/3.     The patient was discussed with Dr. Bailon and he is in agreement with the above note.  "

## 2024-12-05 NOTE — NURSING NOTE
Patient was observed to be visible in the community this evening; spending time in the dining area with female peers.  She is pleasant and cooperative during staff interactions; presents with intermittent anxiety.  Apologetic throughout assessment query requiring verbal support. She does endorse ongoing, high anxiety and depression, denies SI,  HI and hallucinations. Luba was medication compliant at HS; calazime cream applied to buttocks and sacrum; Nystatin powder applied to abdominal and breast folds.  Large bandaid was loose and hanging off the left lower shin revealing scabbed area for which bandaid was removed and replaced with new bandaid.  Ambulates with a walker.  Continuous safety rounding in progress.

## 2024-12-05 NOTE — NURSING NOTE
"Patient visible in milieu, pleasant and cooperative in interaction. Social with staff and peers. Patient with mild tremors due to anxiety, support provided. Patient endorsed high anxiety, unable to identify cause. Denies depression, SI/HI, hallucinations. Noted negative thinking. Per patient,  \"I don't think Ill ever get out of here\". Difficulty with decision making, independence encouraged. Remains medication compliant and on 15\" minute checks for safety and behaviors.  "

## 2024-12-05 NOTE — PLAN OF CARE
Problem: PAIN - ADULT  Goal: Verbalizes/displays adequate comfort level or baseline comfort level  Description: Interventions:  - Encourage patient to monitor pain and request assistance  - Assess pain using appropriate pain scale  - Administer analgesics based on type and severity of pain and evaluate response  - Implement non-pharmacological measures as appropriate and evaluate response  - Consider cultural and social influences on pain and pain management  - Notify physician/advanced practitioner if interventions unsuccessful or patient reports new pain  Outcome: Progressing     Problem: SAFETY ADULT  Goal: Patient will remain free of falls  Description: INTERVENTIONS:  - Educate patient/family on patient safety including physical limitations  - Instruct patient to call for assistance with activity   - Consult OT/PT to assist with strengthening/mobility   - Keep Call bell within reach  - Keep bed low and locked with side rails adjusted as appropriate  - Keep care items and personal belongings within reach  - Initiate and maintain comfort rounds  - Make Fall Risk Sign visible to staff  - Offer Toileting every 2 Hours, in advance of need  - Initiate/Maintain bed alarm  - Obtain necessary fall risk management equipment: walker  - Apply yellow socks and bracelet for high fall risk patients  - Consider moving patient to room near nurses station  Outcome: Progressing     Problem: Depression  Goal: Verbalize thoughts and feelings  Description: Interventions:  - Assess and re-assess patient's level of risk   - Engage patient in 1:1 interactions, daily, for a minimum of 15 minutes   - Encourage patient to express feelings, fears, frustrations, hopes   Outcome: Progressing  Goal: Refrain from harming self  Description: Interventions:  - Monitor patient closely, per order   - Supervise medication ingestion, monitor effects and side effects   Outcome: Progressing     Problem: Anxiety  Goal: Anxiety is at manageable  level  Description: Interventions:  - Assess and monitor patient's anxiety level.   - Monitor for signs and symptoms (heart palpitations, chest pain, shortness of breath, headaches, nausea, feeling jumpy, restlessness, irritable, apprehensive).   - Collaborate with interdisciplinary team and initiate plan and interventions as ordered.  - [place] patient to unit/surroundings  - Explain treatment plan  - Encourage participation in care  - Encourage verbalization of concerns/fears  - Identify coping mechanisms  - Assist in developing anxiety-reducing skills  - Administer/offer alternative therapies  - Limit or eliminate stimulants  Outcome: Progressing     Problem: Nutrition/Hydration-ADULT  Goal: Nutrient/Hydration intake appropriate for improving, restoring or maintaining nutritional needs  Description: Monitor and assess patient's nutrition/hydration status for malnutrition. Collaborate with interdisciplinary team and initiate plan and interventions as ordered.  Monitor patient's weight and dietary intake as ordered or per policy. Utilize nutrition screening tool and intervene as necessary. Determine patient's food preferences and provide high-protein, high-caloric foods as appropriate.     INTERVENTIONS:  - Monitor oral intake, urinary output, labs, and treatment plans  - Assess nutrition and hydration status and recommend course of action  - Evaluate amount of meals eaten  - Assist patient with eating if necessary   - Allow adequate time for meals  - Recommend/ encourage appropriate diets, oral nutritional supplements, and vitamin/mineral supplements  - Order, calculate, and assess calorie counts as needed  - Recommend, monitor, and adjust tube feedings and TPN/PPN based on assessed needs  - Assess need for intravenous fluids  - Provide specific nutrition/hydration education as appropriate  - Include patient/family/caregiver in decisions related to nutrition  Outcome: Progressing

## 2024-12-05 NOTE — PROGRESS NOTES
Progress Note - Behavioral Health   Name: Luba Barajas 89 y.o. female I MRN: [id number]   Unit/Bed#: [facility] 208-02 I Date of Admission: 11/26/2024   Date of Service: 12/5/2024 I Hospital Day: 9         Assessment & Plan  Generalized anxiety disorder    MDD (major depressive disorder), recurrent episode, severe (HCC)    HTN (hypertension)    Mild neurocognitive disorder        Recommended Treatment:     Planned medication and treatment changes:    All current active medications have been reviewed  Encourage group therapy, milieu therapy and occupational therapy  Behavioral Health checks for safety monitoring  Ct with current meds and support.  Monitor behavior and fall risk.    Current medications:  Current Facility-Administered Medications   Medication Dose Route Frequency Provider Last Rate    acetaminophen  650 mg Oral Q4H PRN Myrna Caldwell MD      acetaminophen  650 mg Oral Q4H PRN Myrna Caldwell MD      acetaminophen  975 mg Oral Q6H PRN Myrna Caldwell MD      aluminum-magnesium hydroxide-simethicone  30 mL Oral Q4H PRN Myrna Caldwell MD      Cholecalciferol  2,000 Units Oral Daily Clare L Dagnall, PA-C      clonazePAM  0.25 mg Oral BID PRN Renard Keyes MD      cyanocobalamin  1,000 mcg Oral Daily Clare L Dagavelino, PA-C      haloperidol lactate  5 mg Intramuscular Q4H PRN Max 4/day Myrna Caldwell MD      hydrOXYzine HCL  25 mg Oral Q6H PRN Max 4/day Myrna Caldwell MD      hydrOXYzine HCL  25 mg Oral TID Renard Keyes MD      hydrOXYzine HCL  50 mg Oral Q6H PRN Max 4/day Myrna Caldwell MD      lisinopril  10 mg Oral Daily Clare L Dagnall, PA-C      menthol-zinc oxide   Topical BID Clare L Dagnall, PA-C      nicotine polacrilex  4 mg Oral Q2H PRN Myrna Caldwell MD      nystatin   Topical BID Clrae L Dagnall, PA-C      polyethylene glycol  17 g Oral Daily PRN Royce Bailon MD      pravastatin  40 mg Oral Daily With Dinner Clare L Dagnall, PA-C      propranolol  5 mg Oral Q8H PRN  Myrna Caldwell MD      risperiDONE  0.25 mg Oral Q4H PRN Max 6/day Myrna Caldwell MD      risperiDONE  0.5 mg Oral Q4H PRN Max 3/day Myrna Caldwell MD      risperiDONE  1 mg Oral Q2H PRN Max 3/day Myrna Caldwell MD      senna-docusate sodium  1 tablet Oral  Royce Bailon MD      sertraline  75 mg Oral Daily Renard Keyes MD      sodium chloride  2 spray Each Nare Q1H PRN Clare Brennan PA-C      traZODone  50 mg Oral Q6H PRN Max 3/day Myrna Caldwell MD         Risks / Benefits of Treatment:    Risks, benefits, and possible side effects of medications explained to patient and patient verbalizes understanding and agreement for treatment.    Subjective:    Behavior over the last 24 hours: minimal improvement.     Luba was seen for re-evaluation of her symptoms. She continues to report feeling anxious, restless an ruminating constantly. She is worried she is going to remain in the hospital for a long time. Denies SI, auditory/visual hallucinations at this time. She has been worried because she has not been able to shower since yesterday due to cold water, and has not had a bowel movement in two days. Sleep has otherwise been ok, although occasionally interrupted because she becomes anxious/restless when she anticipates getting a new roommate. No changes in appetite.     Sleep: slept better  Appetite: fair  Medication side effects: denies   ROS: all other systems are negative     Mental Status Evaluation:    Appearance:  age appropriate, dressed in hospital attire   Behavior:  restless, responds to redirection   Speech:  normal rate and volume   Mood:  anxious   Affect:  mood-congruent   Thought Process:  perseverative, negative thinking   Associations: intact associations   Thought Content:  no overt delusions, ruminations   Perceptual Disturbances: Denies visual and auditory hallucinations when asked   Risk Potential: Suicidal ideation - None at present  Homicidal ideation - None at  present  Potential for aggression - Not at present   Sensorium:  oriented to person, place, and time/date   Memory:  recent and remote memory grossly intact   Consciousness:  alert and awake   Attention/Concentration: attention span and concentration are age appropriate   Insight:  limited   Judgment: fair   Gait/Station: uses walker   Motor Activity: no abnormal movements     Vital signs in last 24 hours:    Temp:  [97.6 °F (36.4 °C)-97.7 °F (36.5 °C)] 97.6 °F (36.4 °C)  HR:  [] 105  BP: (128-156)/(71-76) 156/76  Resp:  [18] 18  SpO2:  [93 %-96 %] 93 %  O2 Device: None (Room air)         Laboratory results: I have personally reviewed all pertinent laboratory/tests results    Most Recent Labs:   Lab Results   Component Value Date    WBC 5.00 11/28/2024    RBC 4.16 11/28/2024    HGB 12.0 11/28/2024    HCT 37.6 11/28/2024     11/28/2024    RDW 14.3 11/28/2024    NEUTROABS 3.70 11/28/2024    SODIUM 140 12/05/2024    K 3.9 12/05/2024     12/05/2024    CO2 26 12/05/2024    BUN 18 12/05/2024    CREATININE 0.78 12/05/2024    GLUC 115 12/05/2024    CALCIUM 9.7 12/05/2024    AST 12 (L) 11/28/2024    ALT 8 11/28/2024    ALKPHOS 59 11/28/2024    TP 6.0 (L) 11/28/2024    ALB 3.7 11/28/2024    TBILI 0.74 11/28/2024    CHOLESTEROL 135 11/28/2024    HDL 58 11/28/2024    TRIG 72 11/28/2024    LDLCALC 63 11/28/2024    NONHDLC 77 11/28/2024    AMZ8QMIZOBOH 0.934 11/26/2024    SYPHILISAB Non-reactive 11/28/2024       Counseling / Coordination of Care:    Patient's progress discussed with staff in treatment team meeting.  Medication changes reviewed with nursing staff.  Supportive therapy provided to patient.  Group attendance encouraged.    Renard Keyes MD 12/05/24

## 2024-12-05 NOTE — SOCIAL WORK
12/05/24    Team Meeting   Meeting Type Daily Rounds   Team Members Present   Team Members Present Physician;Nurse;;Occupational Therapist   Physician Team Member Stephy SORENSON   Nursing Team Member Mahin LEÓN   Social Work Team Member Gisela URIOSTEGUI   OT Team Member Joshua LEWIS   Patient/Family Present   Patient Present No   Patient's Family Present No    201 , slept , visible social, anxiety , negative thinking , Denies SI , HI, AVH .

## 2024-12-05 NOTE — NUTRITION
24 1413   Biochemical Data,Medical Tests, and Procedures   Biochemical Data/Medical Tests/Procedures Lab values reviewed;Meds reviewed   Labs (Comment)  B   Meds (Comment) Vit D, klonopin, Vit B12, haldol, atarax, zestril, pravachol, inderal, risperdal, desyrel   Nutrition-Focused Physical Exam   Nutrition-Focused Physical Exam Findings RN skin assessment reviewed;No skin issues documented   Medical-Related Concerns PMH reviewed   Adequacy of Intake   Nutrition Modality PO   Feeding Route   PO Independent   Current PO Intake   Current Diet Order Regular, chopped meats thin liquids   Current Meal Intake 50-75%;%   Estimated calorie intake compared to estimated need Nutrient needs met.   PES Statement   Problem Continue previous diagnosis   Recommendations/Interventions   Malnutrition/BMI Present No  (does not meet criteria)   Summary Regular, chopped meats thin liquids. No nutrition supplements. Meal completions %. No new weights. Medications reviewed. Skin intact. Seen by ST on , recommendations for Regular, chopped meats thin liquids. She states her appetite is pretty good. She reprots her swallowing has been good. She states the portion sizes are a little large. Discussed providing small portions; she is agreeable.   Interventions/Recommendations Adjust diet order  (add small portions)   Education Assessment   Education Education not indicated at this time;Patient/caregiver not appropriate for education at this time   Patient Nutrition Goals   Goal Avoid weight loss;Meet PO needs   Goal Status Met;Extended   Timeframe to complete goal by next f/u   Nutrition Complexity Risk   Nutrition complexity level Moderate risk   Follow up date 24

## 2024-12-06 PROCEDURE — 99232 SBSQ HOSP IP/OBS MODERATE 35: CPT | Performed by: PSYCHIATRY & NEUROLOGY

## 2024-12-06 PROCEDURE — 87081 CULTURE SCREEN ONLY: CPT | Performed by: FAMILY MEDICINE

## 2024-12-06 RX ORDER — SERTRALINE HYDROCHLORIDE 100 MG/1
100 TABLET, FILM COATED ORAL DAILY
Status: DISCONTINUED | OUTPATIENT
Start: 2024-12-07 | End: 2024-12-23 | Stop reason: HOSPADM

## 2024-12-06 RX ORDER — DONEPEZIL HYDROCHLORIDE 5 MG/1
5 TABLET, FILM COATED ORAL
Status: DISCONTINUED | OUTPATIENT
Start: 2024-12-06 | End: 2024-12-23 | Stop reason: HOSPADM

## 2024-12-06 RX ADMIN — HYDROXYZINE HYDROCHLORIDE 25 MG: 25 TABLET ORAL at 21:08

## 2024-12-06 RX ADMIN — PRAVASTATIN SODIUM 40 MG: 40 TABLET ORAL at 16:38

## 2024-12-06 RX ADMIN — DONEPEZIL HYDROCHLORIDE 5 MG: 5 TABLET ORAL at 21:08

## 2024-12-06 RX ADMIN — SERTRALINE HYDROCHLORIDE 75 MG: 50 TABLET ORAL at 08:23

## 2024-12-06 RX ADMIN — SENNOSIDES AND DOCUSATE SODIUM 1 TABLET: 8.6; 5 TABLET ORAL at 21:08

## 2024-12-06 RX ADMIN — HYDROXYZINE HYDROCHLORIDE 25 MG: 25 TABLET ORAL at 16:38

## 2024-12-06 RX ADMIN — Medication 2000 UNITS: at 08:23

## 2024-12-06 RX ADMIN — LISINOPRIL 10 MG: 10 TABLET ORAL at 08:23

## 2024-12-06 RX ADMIN — HYDROXYZINE HYDROCHLORIDE 50 MG: 50 TABLET, FILM COATED ORAL at 05:27

## 2024-12-06 RX ADMIN — CYANOCOBALAMIN TAB 500 MCG 1000 MCG: 500 TAB at 08:24

## 2024-12-06 NOTE — PLAN OF CARE
Problem: Ineffective Coping  Goal: Participates in unit activities  Description: Interventions:  - Provide therapeutic environment   - Provide required programming   - Redirect inappropriate behaviors   Outcome: Progressing     Problem: Depression  Goal: Attend and participate in unit activities, including therapeutic, recreational, and educational groups  Description: Interventions:  - Provide therapeutic and educational activities daily, encourage attendance and participation, and document same in the medical record   Outcome: Progressing      I have reviewed and confirmed nurses' notes for patient's medications, allergies, medical history, and surgical history.

## 2024-12-06 NOTE — NURSING NOTE
"Patient has been cooperative and visible on the unit. Patient still presents very nervous, shaking and tearful at times. Patient worries a lot and states she is scared but does not know why. Patient needs a lot of encouragement to calm down and focus. Patient constantly needs reminders about how this unit works and that it is a short term facility. Patient ruminates on never \"getting better.\" No acute behaviors noted. Continuous safety monitoring in place.   "

## 2024-12-06 NOTE — PLAN OF CARE
Problem: PAIN - ADULT  Goal: Verbalizes/displays adequate comfort level or baseline comfort level  Description: Interventions:  - Encourage patient to monitor pain and request assistance  - Assess pain using appropriate pain scale  - Administer analgesics based on type and severity of pain and evaluate response  - Implement non-pharmacological measures as appropriate and evaluate response  - Consider cultural and social influences on pain and pain management  - Notify physician/advanced practitioner if interventions unsuccessful or patient reports new pain  Outcome: Progressing     Problem: SAFETY ADULT  Goal: Patient will remain free of falls  Description: INTERVENTIONS:  - Educate patient/family on patient safety including physical limitations  - Instruct patient to call for assistance with activity   - Consult OT/PT to assist with strengthening/mobility   - Keep Call bell within reach  - Keep bed low and locked with side rails adjusted as appropriate  - Keep care items and personal belongings within reach  - Initiate and maintain comfort rounds  - Make Fall Risk Sign visible to staff  - Offer Toileting every 2 Hours, in advance of need  - Initiate/Maintain bed alarm  - Obtain necessary fall risk management equipment: walker  - Apply yellow socks and bracelet for high fall risk patients  - Consider moving patient to room near nurses station  Outcome: Progressing     Problem: Ineffective Coping  Goal: Participates in unit activities  Description: Interventions:  - Provide therapeutic environment   - Provide required programming   - Redirect inappropriate behaviors   Outcome: Progressing  Goal: Understands least restrictive measures  Description: Interventions:  - Utilize least restrictive behavior  Outcome: Progressing  Goal: Free from restraint events  Description: - Utilize least restrictive measures   - Provide behavioral interventions   - Redirect inappropriate behaviors   Outcome: Progressing     Problem:  Depression  Goal: Verbalize thoughts and feelings  Description: Interventions:  - Assess and re-assess patient's level of risk   - Engage patient in 1:1 interactions, daily, for a minimum of 15 minutes   - Encourage patient to express feelings, fears, frustrations, hopes   Outcome: Progressing     Problem: Anxiety  Goal: Anxiety is at manageable level  Description: Interventions:  - Assess and monitor patient's anxiety level.   - Monitor for signs and symptoms (heart palpitations, chest pain, shortness of breath, headaches, nausea, feeling jumpy, restlessness, irritable, apprehensive).   - Collaborate with interdisciplinary team and initiate plan and interventions as ordered.  - [place] patient to unit/surroundings  - Explain treatment plan  - Encourage participation in care  - Encourage verbalization of concerns/fears  - Identify coping mechanisms  - Assist in developing anxiety-reducing skills  - Administer/offer alternative therapies  - Limit or eliminate stimulants  Outcome: Progressing     Problem: Nutrition/Hydration-ADULT  Goal: Nutrient/Hydration intake appropriate for improving, restoring or maintaining nutritional needs  Description: Monitor and assess patient's nutrition/hydration status for malnutrition. Collaborate with interdisciplinary team and initiate plan and interventions as ordered.  Monitor patient's weight and dietary intake as ordered or per policy. Utilize nutrition screening tool and intervene as necessary. Determine patient's food preferences and provide high-protein, high-caloric foods as appropriate.     INTERVENTIONS:  - Monitor oral intake, urinary output, labs, and treatment plans  - Assess nutrition and hydration status and recommend course of action  - Evaluate amount of meals eaten  - Assist patient with eating if necessary   - Allow adequate time for meals  - Recommend/ encourage appropriate diets, oral nutritional supplements, and vitamin/mineral supplements  - Order, calculate,  and assess calorie counts as needed  - Recommend, monitor, and adjust tube feedings and TPN/PPN based on assessed needs  - Assess need for intravenous fluids  - Provide specific nutrition/hydration education as appropriate  - Include patient/family/caregiver in decisions related to nutrition  Outcome: Progressing

## 2024-12-06 NOTE — NURSING NOTE
"Patient appears to have slept through the entire overnight hours except waking on one occasion to use the bathroom. Currently, patient is awake at this time and is tearful.  She states \"I didn't eat or drink yesterday because I wanted to die.\"  She is crying and apologetic, stating she does not know why she felt that way.  She is denies feeling suicidal at this time.  Her hands are trembling and she is feeling restless.  Performed a Stacy Scale rating with a result of 18.  Administered PRN Atarax 50 mg for moderate anxiety.  Also, performed a nasal swab as per order.  Assisted patient to the bathroom to void and then assisted back to bed.  She is resting comfortably at this time. Continuous safety rounding in progress.   "

## 2024-12-06 NOTE — NURSING NOTE
"Patient was observed to be visible in the community this evening; spending time in the dining area with peers.  She states her anxiety and depression are \"pretty good, really.\"  Denies SI, HI and hallucinations.  She presents as less anxious; pleasant during staff interactions. Denies any pain.  She was medication compliant with all PO medications, however she did decline Calmoseptine application to the sacrum/ buttocks, stating she did not want it tonight.  She did accept the Nystatin powder application to her abdominal, groin and breast folds. Ambulates well with walker.  Continuous safety rounding in progress.   "

## 2024-12-06 NOTE — SOCIAL WORK
12/06/24    Team Meeting   Meeting Type Daily Rounds   Team Members Present   Team Members Present Physician;Nurse;;Occupational Therapist   Physician Team Member Stephy SORENSON, Adrien NEAL   Nursing Team Member Blossom LEÓN   Social Work Team Member Gisela URIOSTEGUI   OT Team Member Joshua LEWIS   Patient/Family Present   Patient Present No   Patient's Family Present No   201 , slept , tearful , negative thinking denies SI, HI, AVH.

## 2024-12-06 NOTE — QUICK NOTE
Spoke to patient son Eh via phone [phone]. Eh updated on his mothers progress and current medication regimen. Patient verbalized his understanding and appreciation. Phone call ended mutually.

## 2024-12-06 NOTE — NURSING NOTE
On reassessment of PRN Atarax effectiveness, patient observed to be resting quietly in bed, appears to be sleeping.  Breathing is easy and nonlabored on room air.  No further episodes of anxiety observed.  PRN seemingly effective at this time.

## 2024-12-06 NOTE — PROGRESS NOTES
Progress Note - Behavioral Health   Name: Luba Barajas 89 y.o. female I MRN: [id number]   Unit/Bed#: [facility] 208-02 I Date of Admission: 11/26/2024   Date of Service: 12/6/2024 I Hospital Day: 10         Assessment & Plan  Generalized anxiety disorder    MDD (major depressive disorder), recurrent episode, severe (HCC)    HTN (hypertension)    Mild neurocognitive disorder        Recommended Treatment:     Planned medication and treatment changes:    All current medication has been reviewed.  Encourage group therapy, milieu therapy and occupational therapy.   Behavioral health checks for safety monitoring.  Increase Zoloft to 100 mg po daily.  Monitor behavior and fall risk.    Current medications:  Current Facility-Administered Medications   Medication Dose Route Frequency Provider Last Rate    acetaminophen  650 mg Oral Q4H PRN Myrna Caldwell MD      acetaminophen  650 mg Oral Q4H PRN Myrna Caldwell MD      acetaminophen  975 mg Oral Q6H PRN Myrna Caldwell MD      aluminum-magnesium hydroxide-simethicone  30 mL Oral Q4H PRN Myrna Caldwell MD      Cholecalciferol  2,000 Units Oral Daily Clare L Lizziel, PA-C      clonazePAM  0.25 mg Oral BID PRN Renard Keyes MD      cyanocobalamin  1,000 mcg Oral Daily Clare Brennan, PA-C      haloperidol lactate  5 mg Intramuscular Q4H PRN Max 4/day Myrna Caldwell MD      hydrOXYzine HCL  25 mg Oral Q6H PRN Max 4/day Myrna Caldwell MD      hydrOXYzine HCL  25 mg Oral TID Renard Keyes MD      hydrOXYzine HCL  50 mg Oral Q6H PRN Max 4/day Myrna Caldwell MD      lisinopril  10 mg Oral Daily Clare L Dagnall, PA-C      menthol-zinc oxide   Topical BID Clare L Anu, PA-C      nicotine polacrilex  4 mg Oral Q2H PRN Myrna Caldwell MD      nystatin   Topical BID Clare L Lizziel, PA-C      polyethylene glycol  17 g Oral Daily PRN Royce Bailon MD      pravastatin  40 mg Oral Daily With Dinner Clare L Anu, PA-C      propranolol  5 mg Oral Q8H PRN  Myrna Caldwell MD      risperiDONE  0.25 mg Oral Q4H PRN Max 6/day Myrna Caldwell MD      risperiDONE  0.5 mg Oral Q4H PRN Max 3/day Myrna Caldwell MD      risperiDONE  1 mg Oral Q2H PRN Max 3/day Myrna Caldwell MD      senna-docusate sodium  1 tablet Oral  Royce Bailon MD      sertraline  75 mg Oral Daily Renard Keyes MD      sodium chloride  2 spray Each Nare Q1H PRN Clare Brennan PA-C      traZODone  50 mg Oral Q6H PRN Max 3/day Myrna Caldwell MD         Risks / Benefits of Treatment:    Risks, benefits, and possible side effects of medications explained to patient and patient verbalizes understanding and agreement for treatment.    Subjective:    Behavior over the last 24 hours: unchanged.     Luba was seen for continuing care. She reports feeling less anxious and restless this morning and has been interacting with selective peers appropriately. She worries and ruminates constantly and states she is not going to get better to return her home. Denies SI or wish to die bur does not feels safe returning home. She is alert and oriented X 2  but has very limited insight into her cognitive function decline. She has been compliant with medication and denies any current side effects. Staff reports some participation in groups and milieu.    Sleep: improving  Appetite: fair  Medication side effects: denies   ROS: all other systems are negative except chronic back pain    Mental Status Evaluation:    Appearance:  age appropriate, casually dressed   Behavior:  responds to redirection   Speech:  normal rate and volume   Mood:  anxious   Affect:  mood-congruent   Thought Process:  perseverative, negative thinking   Associations: intact associations   Thought Content:  no overt delusions, ruminations   Perceptual Disturbances: denies auditory hallucinations when asked   Risk Potential: Suicidal ideation - None at present  Homicidal ideation - None at present  Potential for aggression - Not at present    Sensorium:  oriented to person, place, and time/date   Memory:  recent and remote memory grossly intact   Consciousness:  alert and awake   Attention/Concentration: attention span and concentration are age appropriate   Insight:  limited   Judgment: fair   Gait/Station: uses walker   Motor Activity: no abnormal movements     Vital signs in last 24 hours:    Temp:  [96.2 °F (35.7 °C)-98.1 °F (36.7 °C)] 97.9 °F (36.6 °C)  HR:  [] 103  BP: (136-157)/(63-78) 157/78  Resp:  [18] 18  SpO2:  [93 %-95 %] 94 %  O2 Device: None (Room air)         Laboratory results: I have personally reviewed all pertinent laboratory/tests results    Most Recent Labs:   Lab Results   Component Value Date    WBC 5.00 11/28/2024    RBC 4.16 11/28/2024    HGB 12.0 11/28/2024    HCT 37.6 11/28/2024     11/28/2024    RDW 14.3 11/28/2024    NEUTROABS 3.70 11/28/2024    SODIUM 140 12/05/2024    K 3.9 12/05/2024     12/05/2024    CO2 26 12/05/2024    BUN 18 12/05/2024    CREATININE 0.78 12/05/2024    GLUC 115 12/05/2024    CALCIUM 9.7 12/05/2024    AST 12 (L) 11/28/2024    ALT 8 11/28/2024    ALKPHOS 59 11/28/2024    TP 6.0 (L) 11/28/2024    ALB 3.7 11/28/2024    TBILI 0.74 11/28/2024    CHOLESTEROL 135 11/28/2024    HDL 58 11/28/2024    TRIG 72 11/28/2024    LDLCALC 63 11/28/2024    NONHDLC 77 11/28/2024    [id number] 0.934 11/26/2024    SYPHILISAB Non-reactive 11/28/2024       Counseling / Coordination of Care:    Patient's progress discussed with staff in treatment team meeting.  Medication changes reviewed with nursing staff.  Supportive therapy provided to patient.  Group attendance encouraged.    Renard Keyes MD 12/06/24

## 2024-12-06 NOTE — PROGRESS NOTES
"Progress Note - Luba Barajas 89 y.o. female MRN: [id number]    Unit/Bed#: -02 Encounter: [id number]        Subjective:   Patient seen and examined at bedside after reviewing the chart and discussing the case with the caring staff.      Patient examined at bedside.  Patient reports feeling more down and anxious today.  Otherwise denies any acute symptoms.     Physical Exam   Vitals: Blood pressure 154/82, pulse 97, temperature (!) 97.3 °F (36.3 °C), temperature source Temporal, resp. rate 18, height 5' 2\" (1.575 m), weight 68.9 kg (152 lb), SpO2 93%.,Body mass index is 27.8 kg/m².  Constitutional: Patient in no acute distress.  HEENT: PERR, EOMI, MMM.  Cardiovascular: Normal rate and regular rhythm.  Pulmonary/Chest: Effort normal and breath sounds normal.  Abdomen: Soft, + BS, NT.    Assessment/Plan:  Luba Barajas is a(n) 89 y.o. female with MDD.     Cardiac with hx of HTN, HLD.  Continue lisinopril 10 mg daily and pravastatin 40 mg daily (rosuvastatin nonform).  Gait abnormality.  PT OT consulted.  Urinary frequency/incontinence.  Chronic and follows with GYN.  UA without evidence of UTI 11/26.  Tried medications for OAB in the past but stopped due to side effects.   Cutaneous candidiasis.  Apply nystatin powder twice daily.  Dysphagia.  SLP consulted.   Vitamin D/B12 deficiency.  Continue daily supplements.  Constipation.  Patient started on Senokot as 1 tablet daily along with MiraLAX as needed 11/28/24.  Dry nares.  Added saline nasal spray as needed 12/3.     The patient was discussed with Dr. Bailon and he is in agreement with the above note.  "

## 2024-12-07 LAB — MRSA NOSE QL CULT: NORMAL

## 2024-12-07 PROCEDURE — 99232 SBSQ HOSP IP/OBS MODERATE 35: CPT | Performed by: PSYCHIATRY & NEUROLOGY

## 2024-12-07 RX ADMIN — NYSTATIN 1 APPLICATION: 100000 POWDER TOPICAL at 17:51

## 2024-12-07 RX ADMIN — ANORECTAL OINTMENT 1 APPLICATION: 15.7; .44; 24; 20.6 OINTMENT TOPICAL at 09:01

## 2024-12-07 RX ADMIN — HYDROXYZINE HYDROCHLORIDE 25 MG: 25 TABLET ORAL at 09:00

## 2024-12-07 RX ADMIN — HYDROXYZINE HYDROCHLORIDE 25 MG: 25 TABLET ORAL at 21:46

## 2024-12-07 RX ADMIN — SENNOSIDES AND DOCUSATE SODIUM 1 TABLET: 8.6; 5 TABLET ORAL at 21:46

## 2024-12-07 RX ADMIN — SERTRALINE 100 MG: 100 TABLET, FILM COATED ORAL at 09:00

## 2024-12-07 RX ADMIN — NYSTATIN 1 APPLICATION: 100000 POWDER TOPICAL at 09:01

## 2024-12-07 RX ADMIN — LISINOPRIL 10 MG: 10 TABLET ORAL at 09:00

## 2024-12-07 RX ADMIN — HYDROXYZINE HYDROCHLORIDE 25 MG: 25 TABLET ORAL at 16:06

## 2024-12-07 RX ADMIN — CYANOCOBALAMIN TAB 500 MCG 1000 MCG: 500 TAB at 09:00

## 2024-12-07 RX ADMIN — HYDROXYZINE HYDROCHLORIDE 50 MG: 50 TABLET, FILM COATED ORAL at 01:08

## 2024-12-07 RX ADMIN — Medication 2000 UNITS: at 09:00

## 2024-12-07 RX ADMIN — DONEPEZIL HYDROCHLORIDE 5 MG: 5 TABLET ORAL at 21:46

## 2024-12-07 RX ADMIN — PRAVASTATIN SODIUM 40 MG: 40 TABLET ORAL at 16:07

## 2024-12-07 RX ADMIN — TRAZODONE HYDROCHLORIDE 50 MG: 50 TABLET ORAL at 04:42

## 2024-12-07 NOTE — PLAN OF CARE
Problem: PAIN - ADULT  Goal: Verbalizes/displays adequate comfort level or baseline comfort level  Description: Interventions:  - Encourage patient to monitor pain and request assistance  - Assess pain using appropriate pain scale  - Administer analgesics based on type and severity of pain and evaluate response  - Implement non-pharmacological measures as appropriate and evaluate response  - Consider cultural and social influences on pain and pain management  - Notify physician/advanced practitioner if interventions unsuccessful or patient reports new pain  Outcome: Progressing     Problem: SAFETY ADULT  Goal: Patient will remain free of falls  Description: INTERVENTIONS:  - Educate patient/family on patient safety including physical limitations  - Instruct patient to call for assistance with activity   - Consult OT/PT to assist with strengthening/mobility   - Keep Call bell within reach  - Keep bed low and locked with side rails adjusted as appropriate  - Keep care items and personal belongings within reach  - Initiate and maintain comfort rounds  - Make Fall Risk Sign visible to staff  - Offer Toileting every 2 Hours, in advance of need  - Initiate/Maintain bed alarm  - Obtain necessary fall risk management equipment: walker  - Apply yellow socks and bracelet for high fall risk patients  - Consider moving patient to room near nurses station  Outcome: Progressing     Problem: Ineffective Coping  Goal: Demonstrates healthy coping skills  Outcome: Progressing  Goal: Participates in unit activities  Description: Interventions:  - Provide therapeutic environment   - Provide required programming   - Redirect inappropriate behaviors   Outcome: Progressing     Problem: Depression  Goal: Verbalize thoughts and feelings  Description: Interventions:  - Assess and re-assess patient's level of risk   - Engage patient in 1:1 interactions, daily, for a minimum of 15 minutes   - Encourage patient to express feelings, fears,  frustrations, hopes   Outcome: Progressing  Goal: Refrain from harming self  Description: Interventions:  - Monitor patient closely, per order   - Supervise medication ingestion, monitor effects and side effects   Outcome: Progressing     Problem: Anxiety  Goal: Anxiety is at manageable level  Description: Interventions:  - Assess and monitor patient's anxiety level.   - Monitor for signs and symptoms (heart palpitations, chest pain, shortness of breath, headaches, nausea, feeling jumpy, restlessness, irritable, apprehensive).   - Collaborate with interdisciplinary team and initiate plan and interventions as ordered.  - Waterproof patient to unit/surroundings  - Explain treatment plan  - Encourage participation in care  - Encourage verbalization of concerns/fears  - Identify coping mechanisms  - Assist in developing anxiety-reducing skills  - Administer/offer alternative therapies  - Limit or eliminate stimulants  Outcome: Progressing     Problem: Nutrition/Hydration-ADULT  Goal: Nutrient/Hydration intake appropriate for improving, restoring or maintaining nutritional needs  Description: Monitor and assess patient's nutrition/hydration status for malnutrition. Collaborate with interdisciplinary team and initiate plan and interventions as ordered.  Monitor patient's weight and dietary intake as ordered or per policy. Utilize nutrition screening tool and intervene as necessary. Determine patient's food preferences and provide high-protein, high-caloric foods as appropriate.     INTERVENTIONS:  - Monitor oral intake, urinary output, labs, and treatment plans  - Assess nutrition and hydration status and recommend course of action  - Evaluate amount of meals eaten  - Assist patient with eating if necessary   - Allow adequate time for meals  - Recommend/ encourage appropriate diets, oral nutritional supplements, and vitamin/mineral supplements  - Order, calculate, and assess calorie counts as needed  - Recommend, monitor, and  adjust tube feedings and TPN/PPN based on assessed needs  - Assess need for intravenous fluids  - Provide specific nutrition/hydration education as appropriate  - Include patient/family/caregiver in decisions related to nutrition  Outcome: Progressing

## 2024-12-07 NOTE — NURSING NOTE
On reassessment of PRN Trazodone, patient informs it did not help her, however she presents with slightly less anxiety.  She does not appear to be restless or tearful.  Monitoring ongoing.

## 2024-12-07 NOTE — NURSING NOTE
Pt up ad karey with walker. Pt is tearful @ times. Pt c/o mild depression & moderate anxiety. Pt is cooperative & compliant with medications. Pt denies any hallucinations, suicidal or homicidal ideations. Q 15 min checks maintained to monitor pt's behavior & safety. Pt is pleasant & brightens with approach.

## 2024-12-07 NOTE — NURSING NOTE
Patient is awake at this time; presents with tearfulness and restlessness.  She is visibly anxious; hands are trembling.  Performed a Stacy Scale rating with a result of 24.  Administered PRN Atarax 50 mg as per order for moderate anxiety.  Will monitor for medication effectiveness.

## 2024-12-07 NOTE — NURSING NOTE
"Patient is awake at this time; tearful and repetitive; wringing her hands.  She states, \"What am I supposed to do.\"  \"I just don't know.\"  Verbal support offered.  She continues with negative thinking.  Performed a Stacy Scale rating with a result of 25.  Administered PRN Trazodone as per order for severe anxiety.   "

## 2024-12-07 NOTE — PROGRESS NOTES
Progress Note - Behavioral Health   Name: Luba Barajas 89 y.o. female I MRN: [id number]   Unit/Bed#: [facility]-02 I Date of Admission: 11/26/2024   Date of Service: 12/7/2024 I Hospital Day: 11         Assessment & Plan  Generalized anxiety disorder  Continue Paxil 10 mg daily  Continue Atarax 10 mg p.o. 3 times daily.  MDD (major depressive disorder), recurrent episode, severe (HCC)  Continue Zoloft 25 mg daily  HTN (hypertension)  Per medical  Mild neurocognitive disorder          Recommended Treatment:     Treatment plan and medication changes discussed and per the attending physician the plan is:     1.Continue with group therapy, milieu therapy and occupational therapy  2.Behavioral Health checks every 15 minutes  3.Continue frequent safety checks and vitals per unit protocol  4.Continue with SLIM medical management as indicated  5.Continue with current medication regimen  6.Will review labs in the a.m.  7.Disposition Planning: Discharge planning and efforts remain ongoing     Planned medication and treatment changes:    All current active medications have been reviewed  Encourage group therapy, milieu therapy and occupational therapy  Behavioral Health checks for safety monitoring  Continue treatment with group therapy, milieu therapy and occupational therapy  Continue current medications:    Current medications:  Current Facility-Administered Medications   Medication Dose Route Frequency Provider Last Rate    acetaminophen  650 mg Oral Q4H PRN Myrna Caldwell MD      acetaminophen  650 mg Oral Q4H PRN Myrna Caldwell MD      acetaminophen  975 mg Oral Q6H PRN Myrna Caldwell MD      aluminum-magnesium hydroxide-simethicone  30 mL Oral Q4H PRN Myrna Caldwell MD      Cholecalciferol  2,000 Units Oral Daily Clare Brennan PA-C      clonazePAM  0.25 mg Oral BID PRN Renard Keyes MD      cyanocobalamin  1,000 mcg Oral Daily Clare Brennan PA-C      donepezil  5 mg Oral HS VAL Muñiz    "   haloperidol lactate  5 mg Intramuscular Q4H PRN Max 4/day Myrna Caldwell MD      hydrOXYzine HCL  25 mg Oral Q6H PRN Max 4/day Myrna Caldwell MD      hydrOXYzine HCL  25 mg Oral TID Renard Keyes MD      hydrOXYzine HCL  50 mg Oral Q6H PRN Max 4/day Myrna Caldwell MD      lisinopril  10 mg Oral Daily Clare L Dagnall, PA-C      menthol-zinc oxide   Topical BID Clare L Dagnall, PA-C      nicotine polacrilex  4 mg Oral Q2H PRN Myrna Caldwell MD      nystatin   Topical BID Clare L Dagnall, PA-C      polyethylene glycol  17 g Oral Daily PRN Royce Bailon MD      pravastatin  40 mg Oral Daily With Dinner Clare L Anu, PA-C      propranolol  5 mg Oral Q8H PRN Myrna Caldwell MD      risperiDONE  0.25 mg Oral Q4H PRN Max 6/day Myrna Caldwell MD      risperiDONE  0.5 mg Oral Q4H PRN Max 3/day Myrna Caldwell MD      risperiDONE  1 mg Oral Q2H PRN Max 3/day Myrna Caldwell MD      senna-docusate sodium  1 tablet Oral HS Royce Bailon MD      sertraline  100 mg Oral Daily Renard Keyes MD      sodium chloride  2 spray Each Nare Q1H PRN Clare L Anu, PA-C      traZODone  50 mg Oral Q6H PRN Max 3/day Myrna Caldwell MD         Risks / Benefits of Treatment:    Risks, benefits, and possible side effects of medications explained to patient and patient verbalizes understanding and agreement for treatment.    Subjective:    Behavior over the last 24 hours: unchanged.     Per staff, Luba continues to report anxiety and depression.  She received Atarax 50mg PRN PO at 0108 and Trazodone 50mg PRN PO at 0442 for anxiety which her moderately effective.      Luba was seen in the hallway away from peers today.  She continues to endorse \"a lot of worry\".  She presents as dysphoric.  When asked to expand upon what she worries about she states \"I don't know\".  She continues with generally negative and ruminating thought process.  She denies any side effects from her current medications.  She denies " suicidal or homicidal ideation intent or plan.  She reports missing her family.  She denies AH/VH and does not appear to be internally preoccupied.       Sleep: decreased  Appetite: normal  Medication side effects: No   ROS: no complaints    Mental Status Evaluation:    Appearance:  marginal hygiene, wearing hospital clothes   Behavior:  cooperative, calm   Speech:  normal rate and volume   Mood:  dysphoric   Affect:  constricted   Thought Process:  negative thinking   Associations: intact associations   Thought Content:  no overt delusions, negative thinking, ruminations   Perceptual Disturbances: none   Risk Potential: Suicidal ideation - None at present, contracts for safety on the unit  Homicidal ideation - None  Potential for aggression - No   Sensorium:  oriented to person, place, and time/date   Memory:  recent and remote memory grossly intact   Consciousness:  alert and awake   Attention/Concentration: attention span and concentration are age appropriate   Insight:  limited   Judgment: partial   Gait/Station: uses walker   Motor Activity: no abnormal movements     Vital signs in last 24 hours:    Temp:  [96.9 °F (36.1 °C)-98.3 °F (36.8 °C)] 96.9 °F (36.1 °C)  HR:  [] 105  BP: (111-169)/(56-72) 111/72  Resp:  [16-20] 16  SpO2:  [92 %-95 %] 92 %  O2 Device: None (Room air)         Laboratory results: I have personally reviewed all pertinent laboratory/tests results    Results from the past 24 hours: No results found for this or any previous visit (from the past 24 hours).  Most Recent Labs:   Lab Results   Component Value Date    WBC 5.00 11/28/2024    RBC 4.16 11/28/2024    HGB 12.0 11/28/2024    HCT 37.6 11/28/2024     11/28/2024    RDW 14.3 11/28/2024    NEUTROABS 3.70 11/28/2024    SODIUM 140 12/05/2024    K 3.9 12/05/2024     12/05/2024    CO2 26 12/05/2024    BUN 18 12/05/2024    CREATININE 0.78 12/05/2024    GLUC 115 12/05/2024    CALCIUM 9.7 12/05/2024    AST 12 (L) 11/28/2024    ALT  8 11/28/2024    ALKPHOS 59 11/28/2024    TP 6.0 (L) 11/28/2024    ALB 3.7 11/28/2024    TBILI 0.74 11/28/2024    CHOLESTEROL 135 11/28/2024    HDL 58 11/28/2024    TRIG 72 11/28/2024    LDLCALC 63 11/28/2024    NONHDLC 77 11/28/2024    [id number] 0.934 11/26/2024    SYPHILISAB Non-reactive 11/28/2024       Suicide/Homicide Risk Assessment:    Risk of Harm to Self:   Nursing Suicide Risk Assessment Last 24 hours: C-SSRS Risk (Since Last Contact)  Calculated C-SSRS Risk Score (Since Last Contact): No Risk Indicated  Current Specific Risk Factors include: diagnosis of mood disorder, current anxiety symptoms, current unstable mood  Protective Factors: no current suicidal ideation, ability to communicate with staff on the unit, able to contract for safety on the unit, taking medications as ordered on the unit, responds to redirection  Based on today's assessment, Luba presents the following risk of harm to self: low    Risk of Harm to Others:  Nursing Homicide Risk Assessment: Violence Risk to Others: Denies within past 6 months  Current Specific Risk Factors include: social difficulties  Protective Factors: no current homicidal ideation, no current psychotic symptoms, compliant with medications on the unit as ordered, compliant with unit milieu, follows staff redirection  Based on today's assessment, Luba presents the following risk of harm to others: low    The following interventions are recommended: Behavioral Health checks for safety monitoring, continued hospitalization on locked unit    Progress Toward Goals: limited improvement, remains perseverative and negative but compliant with medications.    Counseling / Coordination of Care:    Total floor / unit time spent today 30 minutes. Greater than 50% of total time was spent with the patient and / or family counseling and / or coordination of care. A description of counseling / coordination of care:    Administrative Statements   I have spent a total time of 30  minutes in caring for this patient on the day of the visit/encounter including Diagnostic results, Documenting in the medical record, Reviewing / ordering tests, medicine, procedures  , Obtaining or reviewing history  , and Communicating with other healthcare professionals .    VAL Wilkins 12/07/24

## 2024-12-07 NOTE — PROGRESS NOTES
"Progress Note - Luba Barajas 89 y.o. female MRN: [id number]    Unit/Bed#: [facility] 208-02 Encounter: [id number]        Subjective:   Patient seen and examined at bedside after reviewing the chart and discussing the case with the caring staff.      Patient examined at bedside.  Patient denies any acute symptoms.    Physical Exam   Vitals: Blood pressure 111/72, pulse 105, temperature (!) 96.9 °F (36.1 °C), temperature source Temporal, resp. rate 16, height 5' 2\" (1.575 m), weight 61.5 kg (135 lb 9.6 oz), SpO2 92%.,Body mass index is 24.8 kg/m².  Constitutional: Patient in no acute distress.  HEENT: PERR, EOMI, MMM.  Cardiovascular: Normal rate and regular rhythm.  Pulmonary/Chest: Effort normal and breath sounds normal.  Abdomen: Soft, + BS, NT.    Assessment/Plan:  Luba Barajas is a(n) 89 y.o. female with MDD.     Cardiac with hx of HTN, HLD.  Continue lisinopril 10 mg daily and pravastatin 40 mg daily (rosuvastatin nonform).  Gait abnormality.  PT OT consulted.  Urinary frequency/incontinence.  Chronic and follows with GYN.  UA without evidence of UTI 11/26.  Tried medications for OAB in the past but stopped due to side effects.   Cutaneous candidiasis.  Apply nystatin powder twice daily.  Dysphagia.  SLP consulted.   Vitamin D/B12 deficiency.  Continue daily supplements.  Constipation.  Patient started on Senokot as 1 tablet daily along with MiraLAX as needed 11/28/24.  Dry nares.  Added saline nasal spray as needed 12/3.     The patient was discussed with Dr. Bailon and he is in agreement with the above note.  "

## 2024-12-07 NOTE — NURSING NOTE
Patient was observed to be visible in the community this evening.  She has been calm and controlled.  No acute behaviors observed.  She endorses ongoing high anxiety and depression, denies SI, HI and hallucinations. Denies any pain.  She expresses relief that she had a BM today.  Ambulates with a walker. Continuous safety rounding in progress.

## 2024-12-08 PROCEDURE — 99232 SBSQ HOSP IP/OBS MODERATE 35: CPT | Performed by: PSYCHIATRY & NEUROLOGY

## 2024-12-08 RX ADMIN — SENNOSIDES AND DOCUSATE SODIUM 1 TABLET: 8.6; 5 TABLET ORAL at 21:40

## 2024-12-08 RX ADMIN — NYSTATIN 1 APPLICATION: 100000 POWDER TOPICAL at 08:37

## 2024-12-08 RX ADMIN — HYDROXYZINE HYDROCHLORIDE 25 MG: 25 TABLET ORAL at 08:36

## 2024-12-08 RX ADMIN — SERTRALINE 100 MG: 100 TABLET, FILM COATED ORAL at 08:36

## 2024-12-08 RX ADMIN — PRAVASTATIN SODIUM 40 MG: 40 TABLET ORAL at 16:26

## 2024-12-08 RX ADMIN — HYDROXYZINE HYDROCHLORIDE 25 MG: 25 TABLET ORAL at 21:40

## 2024-12-08 RX ADMIN — DONEPEZIL HYDROCHLORIDE 5 MG: 5 TABLET ORAL at 21:40

## 2024-12-08 RX ADMIN — Medication 2000 UNITS: at 08:36

## 2024-12-08 RX ADMIN — HYDROXYZINE HYDROCHLORIDE 25 MG: 25 TABLET ORAL at 16:26

## 2024-12-08 RX ADMIN — CYANOCOBALAMIN TAB 500 MCG 1000 MCG: 500 TAB at 08:36

## 2024-12-08 RX ADMIN — LISINOPRIL 10 MG: 10 TABLET ORAL at 08:36

## 2024-12-08 RX ADMIN — ANORECTAL OINTMENT 1 APPLICATION: 15.7; .44; 24; 20.6 OINTMENT TOPICAL at 08:36

## 2024-12-08 NOTE — PROGRESS NOTES
"Progress Note - Luba Barajas 89 y.o. female MRN: [id number]    Unit/Bed#: [facility] 208-02 Encounter: [id number]        Subjective:   Patient seen and examined at bedside after reviewing the chart and discussing the case with the caring staff.      Patient examined at bedside.  Patient denies any acute symptoms.    Physical Exam   Vitals: Blood pressure 148/65, pulse 70, temperature 97.6 °F (36.4 °C), temperature source Temporal, resp. rate 18, height 5' 2\" (1.575 m), weight 61.5 kg (135 lb 9.6 oz), SpO2 96%.,Body mass index is 24.8 kg/m².  Constitutional: Patient in no acute distress.  HEENT: PERR, EOMI, MMM.  Cardiovascular: Normal rate and regular rhythm.  Pulmonary/Chest: Effort normal and breath sounds normal.  Abdomen: Soft, + BS, NT.    Assessment/Plan:  Luba Barajas is a(n) 89 y.o. female with MDD.     Cardiac with hx of HTN, HLD.  Continue lisinopril 10 mg daily and pravastatin 40 mg daily (rosuvastatin nonform).  Gait abnormality.  PT OT consulted.  Urinary frequency/incontinence.  Chronic and follows with GYN.  UA without evidence of UTI 11/26.  Tried medications for OAB in the past but stopped due to side effects.   Cutaneous candidiasis.  Apply nystatin powder twice daily.  Dysphagia.  SLP consulted.   Vitamin D/B12 deficiency.  Continue daily supplements.  Constipation.  Patient started on Senokot as 1 tablet daily along with MiraLAX as needed 11/28/24.  Dry nares.  Added saline nasal spray as needed 12/3.     The patient was discussed with Dr. Bailon and he is in agreement with the above note.  "

## 2024-12-08 NOTE — PROGRESS NOTES
Progress Note - Behavioral Health   Name: Luba Barajas 89 y.o. female I MRN: [id number]   Unit/Bed#: [facility]02 I Date of Admission: 11/26/2024   Date of Service: 12/8/2024 I Hospital Day: 12         Assessment & Plan  Generalized anxiety disorder  Continue Paxil 10 mg daily  Continue Atarax 10 mg p.o. 3 times daily.  MDD (major depressive disorder), recurrent episode, severe (HCC)  Continue Zoloft 25 mg daily  HTN (hypertension)  Per medical  Mild neurocognitive disorder          Recommended Treatment:   Treatment plan and medication changes discussed and per the attending physician the plan is:     1.Continue with group therapy, milieu therapy and occupational therapy  2.Behavioral Health checks every 15 minutes  3.Continue frequent safety checks and vitals per unit protocol  4.Continue with SLIM medical management as indicated  5.Continue with current medication regimen  6.Will review labs in the a.m.  7.Disposition Planning: Discharge planning and efforts remain ongoing     Planned medication and treatment changes:      All current active medications have been reviewed  Encourage group therapy, milieu therapy and occupational therapy  Behavioral Health checks for safety monitoring  Continue treatment with group therapy, milieu therapy and occupational therapyContinue current medications:    Current medications:  Current Facility-Administered Medications   Medication Dose Route Frequency Provider Last Rate    acetaminophen  650 mg Oral Q4H PRN Myrna Caldwell MD      acetaminophen  650 mg Oral Q4H PRN Myrna Caldwell MD      acetaminophen  975 mg Oral Q6H PRN Myrna Caldwell MD      aluminum-magnesium hydroxide-simethicone  30 mL Oral Q4H PRN Myrna Caldwell MD      Cholecalciferol  2,000 Units Oral Daily Clare Brennan PA-C      clonazePAM  0.25 mg Oral BID PRN Renard Keyse MD      cyanocobalamin  1,000 mcg Oral Daily Clare Brennan PA-C      donepezil  5 mg Oral HS VAL Muñiz    "   haloperidol lactate  5 mg Intramuscular Q4H PRN Max 4/day Myrna Caldwell MD      hydrOXYzine HCL  25 mg Oral Q6H PRN Max 4/day Myrna Caldwell MD      hydrOXYzine HCL  25 mg Oral TID Renard Keyes MD      hydrOXYzine HCL  50 mg Oral Q6H PRN Max 4/day Myrna Caldwell MD      lisinopril  10 mg Oral Daily Clare L Dagnall, PA-C      menthol-zinc oxide   Topical BID Clare L Dagnall, PA-C      nicotine polacrilex  4 mg Oral Q2H PRN Myrna Caldwell MD      nystatin   Topical BID Clare L Dagnall, PA-C      polyethylene glycol  17 g Oral Daily PRN Royce Bailon MD      pravastatin  40 mg Oral Daily With Dinner Clare L AGUILAR BrennanC      propranolol  5 mg Oral Q8H PRN Mryna Caldwell MD      risperiDONE  0.25 mg Oral Q4H PRN Max 6/day Myrna Caldwell MD      risperiDONE  0.5 mg Oral Q4H PRN Max 3/day Myrna Caldwell MD      risperiDONE  1 mg Oral Q2H PRN Max 3/day Myrna Caldwell MD      senna-docusate sodium  1 tablet Oral HS Royce Bailon MD      sertraline  100 mg Oral Daily Renard Keyes MD      sodium chloride  2 spray Each Nare Q1H PRN Clare Brennan, PA-C      traZODone  50 mg Oral Q6H PRN Max 3/day Myrna Caldwell MD         Risks / Benefits of Treatment:    Risks, benefits, and possible side effects of medications explained to patient and patient verbalizes understanding and agreement for treatment.    Subjective:    Behavior over the last 24 hours: unchanged.     Per staff, Luba remains intermittently tearful and anxious on the unit.  She is cooperative with meals and medications.  She slept overnight.    Luba was seen in her room for psychiatric follow up today.  She was anxious and restless on exam.  She was redirected to rest in bed as she was just given her scheduled Atarax.  She was redirectable.  She reports anxiety and appeared to calm a bit when she was reoriented.  She was perseverative and repeated \"I don't know what I'm suppose to do, is someone going to help me?\".  " Reassurance was provided. She denies suicidal or homicidal ideation intent or plan.  She denies AH/VH and does not appear to be internally preoccupied.  She is compliant with medications and denies side effects.     Sleep: normal  Appetite: normal  Medication side effects: No   ROS: no complaints    Mental Status Evaluation:    Appearance:  disheveled, wearing hospital clothes   Behavior:  restless   Speech:  perseverative   Mood:  anxious   Affect:  tearful   Thought Process:  perseverative   Associations: concrete associations   Thought Content:  no overt delusions, negative thoughts, ruminating thoughts   Perceptual Disturbances: none   Risk Potential: Suicidal ideation - None at present, contracts for safety on the unit, would talk to staff if not feeling safe on the unit  Homicidal ideation - None  Potential for aggression - No   Sensorium:  oriented to person and place   Memory:  recent memory mildly impaired   Consciousness:  alert and awake   Attention/Concentration: attention span and concentration appear shorter than expected for age   Insight:  limited   Judgment: limited   Gait/Station: in bed   Motor Activity: no abnormal movements     Vital signs in last 24 hours:    Temp:  [97.5 °F (36.4 °C)-97.6 °F (36.4 °C)] 97.6 °F (36.4 °C)  HR:  [] 70  BP: (119-148)/(55-65) 148/65  Resp:  [18] 18  SpO2:  [95 %-96 %] 96 %  O2 Device: None (Room air)         Laboratory results: I have personally reviewed all pertinent laboratory/tests results    Results from the past 24 hours: No results found for this or any previous visit (from the past 24 hours).  Most Recent Labs:   Lab Results   Component Value Date    WBC 5.00 11/28/2024    RBC 4.16 11/28/2024    HGB 12.0 11/28/2024    HCT 37.6 11/28/2024     11/28/2024    RDW 14.3 11/28/2024    NEUTROABS 3.70 11/28/2024    SODIUM 140 12/05/2024    K 3.9 12/05/2024     12/05/2024    CO2 26 12/05/2024    BUN 18 12/05/2024    CREATININE 0.78 12/05/2024     GLUC 115 12/05/2024    CALCIUM 9.7 12/05/2024    AST 12 (L) 11/28/2024    ALT 8 11/28/2024    ALKPHOS 59 11/28/2024    TP 6.0 (L) 11/28/2024    ALB 3.7 11/28/2024    TBILI 0.74 11/28/2024    CHOLESTEROL 135 11/28/2024    HDL 58 11/28/2024    TRIG 72 11/28/2024    LDLCALC 63 11/28/2024    NONHDLC 77 11/28/2024    [id number] 0.934 11/26/2024    SYPHILISAB Non-reactive 11/28/2024       Suicide/Homicide Risk Assessment:    Risk of Harm to Self:   Nursing Suicide Risk Assessment Last 24 hours: C-SSRS Risk (Since Last Contact)  Calculated C-SSRS Risk Score (Since Last Contact): No Risk Indicated  Current Specific Risk Factors include: diagnosis of mood disorder  Protective Factors: no current suicidal ideation, ability to communicate with staff on the unit, able to contract for safety on the unit, taking medications as ordered on the unit, responds to redirection  Based on today's assessment, Luba presents the following risk of harm to self: low    Risk of Harm to Others:  Nursing Homicide Risk Assessment: Violence Risk to Others: Denies within past 6 months  Current Specific Risk Factors include: social difficulties  Protective Factors: no current homicidal ideation, compliant with medications on the unit as ordered, follows staff redirection  Based on today's assessment, Lbua presents the following risk of harm to others: low    The following interventions are recommended: Behavioral Health checks for safety monitoring, continued hospitalization on locked unit    Progress Toward Goals: limited improvement, patient continues to be anxious but is compliant with medications    Counseling / Coordination of Care:    Total floor / unit time spent today 30 minutes. Greater than 50% of total time was spent with the patient and / or family counseling and / or coordination of care. A description of counseling / coordination of care:    Administrative Statements   I have spent a total time of 30 minutes in caring for this  patient on the day of the visit/encounter including Diagnostic results, Documenting in the medical record, Reviewing / ordering tests, medicine, procedures  , Obtaining or reviewing history  , and Communicating with other healthcare professionals .    VAL Wilkins 12/08/24

## 2024-12-08 NOTE — NURSING NOTE
Pt was visible on the unit this evening, social w/ select peers. Pt endorses anxiety and generalized worry. Pt has some confusion at times. Pt was cooperative and med compliant. Will CTM. Q15 minute pt safety checks ongoing.

## 2024-12-08 NOTE — NURSING NOTE
BLEPS assessment completed & see Head to Toe assessment. Lungs clear upon auscultation & no peripheral edema noted. Pt is tearful & shaky with movements. Pt c/o high anxiety & mild depression. Pt receives scheduled Atarax 25 mg TID. Pt denies any hallucinations, suicidal or homicidal ideations. Q 15 min checks maintained.to monitor pt's behavior & safety. Pt is cooperative & compliant with medications. Pt up ad karey with walker. Pt socializes minimally with other patients.

## 2024-12-08 NOTE — NURSING NOTE
Patient appears to have slept thru the night, No acute behaviors observed or concerns voiced. Will CTM. Q15 minute patient safety in progress.

## 2024-12-09 PROCEDURE — 99232 SBSQ HOSP IP/OBS MODERATE 35: CPT | Performed by: PSYCHIATRY & NEUROLOGY

## 2024-12-09 RX ORDER — CLONAZEPAM 0.5 MG/1
0.5 TABLET ORAL 2 TIMES DAILY PRN
Status: DISCONTINUED | OUTPATIENT
Start: 2024-12-09 | End: 2024-12-23 | Stop reason: HOSPADM

## 2024-12-09 RX ADMIN — LISINOPRIL 10 MG: 10 TABLET ORAL at 08:09

## 2024-12-09 RX ADMIN — NYSTATIN: 100000 POWDER TOPICAL at 09:05

## 2024-12-09 RX ADMIN — HYDROXYZINE HYDROCHLORIDE 25 MG: 25 TABLET ORAL at 16:43

## 2024-12-09 RX ADMIN — HYDROXYZINE HYDROCHLORIDE 25 MG: 25 TABLET ORAL at 08:09

## 2024-12-09 RX ADMIN — TRAZODONE HYDROCHLORIDE 50 MG: 50 TABLET ORAL at 22:54

## 2024-12-09 RX ADMIN — CYANOCOBALAMIN TAB 500 MCG 1000 MCG: 500 TAB at 08:09

## 2024-12-09 RX ADMIN — ANORECTAL OINTMENT: 15.7; .44; 24; 20.6 OINTMENT TOPICAL at 09:05

## 2024-12-09 RX ADMIN — SERTRALINE 100 MG: 100 TABLET, FILM COATED ORAL at 08:09

## 2024-12-09 RX ADMIN — Medication 2000 UNITS: at 08:09

## 2024-12-09 RX ADMIN — CLONAZEPAM 0.25 MG: 0.5 TABLET ORAL at 11:54

## 2024-12-09 RX ADMIN — SENNOSIDES AND DOCUSATE SODIUM 1 TABLET: 8.6; 5 TABLET ORAL at 21:43

## 2024-12-09 RX ADMIN — DONEPEZIL HYDROCHLORIDE 5 MG: 5 TABLET ORAL at 21:42

## 2024-12-09 RX ADMIN — PRAVASTATIN SODIUM 40 MG: 40 TABLET ORAL at 16:43

## 2024-12-09 RX ADMIN — HYDROXYZINE HYDROCHLORIDE 25 MG: 25 TABLET ORAL at 21:43

## 2024-12-09 NOTE — PLAN OF CARE
Problem: Potential for Falls  Goal: Patient will remain free of falls  Description: Safety Plan- High Fall Risk    The Patient:  - Is identified on the bed board as a high fall risk: Yes   - Has yellow socks and band applied: Yes   - Requires this level of observation: Increased Rounding  - Requires staff assistance with showering  - Is on a toileting schedule: No  - Uses the following assistive device: Rolling Walker  - Requires alarm(s) on bed  - Has a call bell within reach: Yes   - Has a tap bell in place: na  - Requires a bedside commode and/or urinal: No  - Has been considered for appropriate room and bed placement: Yes   - Has been reported to treatment team as a high risk for falls: Yes      Outcome: Progressing

## 2024-12-09 NOTE — PROGRESS NOTES
Progress Note - Behavioral Health   Name: Luba Barajas 89 y.o. female I MRN: [id number]  Unit/Bed#: [facility]02 I Date of Admission: 11/26/2024   Date of Service: 12/9/2024 I Hospital Day: 13    Assessment & Plan  Generalized anxiety disorder  Continue Zoloft 100 mg daily  Continue Atarax 25 mg p.o. 3 times daily.  Increase as needed Klonopin to 0.5 mg twice daily as needed for anxiety  MDD (major depressive disorder), recurrent episode, severe (HCC)  Continue Zoloft 100 mg daily  HTN (hypertension)  Per medical  Mild neurocognitive disorder  Supportive    Progress Toward Goals: Unchanged    Recommended Treatment: Continue with group therapy, milieu therapy and occupational therapy.      Risks, benefits and possible side effects of Medications:   Risks, benefits, and possible side effects of medications explained to patient and patient verbalizes understanding.      History of Present Illness   Behavior over the last 24 hours:  unchanged  Sleep: Adequate  Appetite: Adequate  Medication side effects: No  ROS: no complaints    Subjective:  Patient seen in room lying in bed.  Patient is restless and fidgety.  She is highly anxious.  She reports feeling scared about things she does not normally.  She praises the employees here to try to help her do things such as showering.  She says they try to help her and but she is scared that she will not remember how to shower.  Patient has many comments like this about other processes.  Patient reports mostly being discharged but does not feel ready.  She is tearful and is asking for help because she does not know what to do.  Patient denies any thoughts to harm herself or others.  She denies any auditory visual hallucinations.  She is medication compliant        Objective   Mental Status Evaluation:  Appearance:  age appropriate, disheveled, and patient lying in bed   Behavior:  Patient is restless and fidgety and has difficulty sitting still.   Speech:  Rapid and perseverative    Mood:  anxious and depressed   Affect:  labile and tearful   Thought Process:  perserverative   Thought Content:  Patient does not endorse overt delusions, but does have overwhelming negative thoughts and ruminations   Perceptual Disturbances: Patient denies   Risk Potential: Suicidal Ideations none  Homicidal Ideations none  Potential for Aggression No   Sensorium:  person and place   Memory:  recent memory grossly intact   Consciousness:  alert and awake    Attention: attention span appeared shorter than expected for age   Insight:  limited   Judgment: limited   Gait/Station: Lying in bed   Motor Activity: no abnormal movements     Medications: all current active meds have been reviewed.      Lab Results: I have reviewed the following results:  Most Recent Labs:   Lab Results   Component Value Date    WBC 5.00 11/28/2024    RBC 4.16 11/28/2024    HGB 12.0 11/28/2024    HCT 37.6 11/28/2024     11/28/2024    RDW 14.3 11/28/2024    NEUTROABS 3.70 11/28/2024    SODIUM 140 12/05/2024    K 3.9 12/05/2024     12/05/2024    CO2 26 12/05/2024    BUN 18 12/05/2024    CREATININE 0.78 12/05/2024    GLUC 115 12/05/2024    GLUF 115 (H) 12/05/2024    CALCIUM 9.7 12/05/2024    AST 12 (L) 11/28/2024    ALT 8 11/28/2024    ALKPHOS 59 11/28/2024    TP 6.0 (L) 11/28/2024    ALB 3.7 11/28/2024    TBILI 0.74 11/28/2024    CHOLESTEROL 135 11/28/2024    HDL 58 11/28/2024    TRIG 72 11/28/2024    LDLCALC 63 11/28/2024    NONHDLC 77 11/28/2024    [id number] 0.934 11/26/2024    RPR Non-Reactive 03/11/2016       Administrative Statements   I have spent a total time of 25 minutes in caring for this patient on the day of the visit/encounter including Counseling / Coordination of care, Documenting in the medical record, Reviewing / ordering tests, medicine, procedures  , and Obtaining or reviewing history  . Topics discussed with the patient / family include symptom assessment and management.

## 2024-12-09 NOTE — PROGRESS NOTES
"Progress Note - Luba Barajas 89 y.o. female MRN: [id number]    Unit/Bed#: [facility] 208[facility]02 Encounter: [id number]        Subjective:   Patient seen and examined at bedside after reviewing the chart and discussing the case with the caring staff.      Patient examined at bedside.  Patient denies any acute symptoms.    Physical Exam   Vitals: Blood pressure 155/91, pulse 99, temperature 98.2 °F (36.8 °C), temperature source Temporal, resp. rate 18, height 5' 2\" (1.575 m), weight 61.5 kg (135 lb 9.6 oz), SpO2 95%.,Body mass index is 24.8 kg/m².  Constitutional: Patient in no acute distress.  HEENT: PERR, EOMI, MMM.  Cardiovascular: Normal rate and regular rhythm.  Pulmonary/Chest: Effort normal and breath sounds normal.  Abdomen: Soft, + BS, NT.    Assessment/Plan:  Luba Barajas is a(n) 89 y.o. female with MDD.     Cardiac with hx of HTN, HLD.  Continue lisinopril 10 mg daily and pravastatin 40 mg daily (rosuvastatin nonform).  Gait abnormality.  PT OT consulted.  Urinary frequency/incontinence.  Chronic and follows with GYN.  UA without evidence of UTI 11/26.  Tried medications for OAB in the past but stopped due to side effects.   Cutaneous candidiasis.  Apply nystatin powder twice daily.  Dysphagia.  SLP consulted.   Vitamin D/B12 deficiency.  Continue daily supplements.  Constipation.  Patient started on Senokot as 1 tablet daily along with MiraLAX as needed 11/28/24.  Dry nares.  Added saline nasal spray as needed 12/3.  "

## 2024-12-09 NOTE — PROGRESS NOTES
12/09/24    Team Meeting   Meeting Type Daily Rounds   Team Members Present   Team Members Present Physician;Occupational Therapist;Nurse;   Physician Team Member Alan NEAL   Nursing Team Member Mahin LEÓN   Social Work Team Member Benjie URIOSTEGUI   Patient/Family Present   Patient Present No   Patient's Family Present No   201, shaky, tearful, denies si/hi/avh, uses walker, no d/c date med adjustments, unsure of self, worries about all, eturn to assistive living

## 2024-12-09 NOTE — NURSING NOTE
Patient visible on the unit. She is very anxious and when asked about depression she stated she didn't know. She is tearful and shaky. Denied SI,HI, or hallucinations. She took all of her morning medications but required a lot of reassurance. Scheduled atarax administered with morning medications. Ambulates with walker. Socializes with select peers and staff. Safety precautions maintained.

## 2024-12-09 NOTE — NURSING NOTE
Patient withdrawn to room. Calm and cooperative. Reports less anxiety and depression this evening. Compliant with medications. Denies SI, HI, and hallucinations. Q 15 minute safety rounds maintained.

## 2024-12-09 NOTE — NURSING NOTE
Administered 0.25 mg klonopin for increased anxiety. Patient shaky and crying. Stacy score was 25 at 11:54 am. Patient feeling much better and is no longer shaky. Patient stated medication was effective.

## 2024-12-10 PROCEDURE — 99232 SBSQ HOSP IP/OBS MODERATE 35: CPT | Performed by: PSYCHIATRY & NEUROLOGY

## 2024-12-10 RX ADMIN — HYDROXYZINE HYDROCHLORIDE 25 MG: 25 TABLET ORAL at 08:13

## 2024-12-10 RX ADMIN — SENNOSIDES AND DOCUSATE SODIUM 1 TABLET: 8.6; 5 TABLET ORAL at 21:08

## 2024-12-10 RX ADMIN — PRAVASTATIN SODIUM 40 MG: 40 TABLET ORAL at 16:34

## 2024-12-10 RX ADMIN — HYDROXYZINE HYDROCHLORIDE 25 MG: 25 TABLET ORAL at 16:34

## 2024-12-10 RX ADMIN — CYANOCOBALAMIN TAB 500 MCG 1000 MCG: 500 TAB at 08:13

## 2024-12-10 RX ADMIN — Medication 2000 UNITS: at 08:13

## 2024-12-10 RX ADMIN — DONEPEZIL HYDROCHLORIDE 5 MG: 5 TABLET ORAL at 21:08

## 2024-12-10 RX ADMIN — NYSTATIN: 100000 POWDER TOPICAL at 08:13

## 2024-12-10 RX ADMIN — HYDROXYZINE HYDROCHLORIDE 25 MG: 25 TABLET ORAL at 21:08

## 2024-12-10 RX ADMIN — ANORECTAL OINTMENT: 15.7; .44; 24; 20.6 OINTMENT TOPICAL at 08:13

## 2024-12-10 RX ADMIN — SERTRALINE 100 MG: 100 TABLET, FILM COATED ORAL at 08:13

## 2024-12-10 RX ADMIN — LISINOPRIL 10 MG: 10 TABLET ORAL at 08:13

## 2024-12-10 NOTE — NURSING NOTE
Pt was withdrawn to her room. Compliant with medication. Cooperative with staff during care. Observed tearful at times, c/o high anxiety. Denied SI, HI, AVH, or depression. Eye contact was good. Speech pattern was rambling and repetitive. Appearance and hygiene was unremarkable. Follow up Trazodone 50 mg effective, pt observed resting in bed. No sign of pain or discomfort. Breathing calm and even. No SOB or labored breathing. Will continue to monitor. Safety checks continued.

## 2024-12-10 NOTE — PLAN OF CARE
Problem: Anxiety  Goal: Anxiety is at manageable level  Description: Interventions:  - Assess and monitor patient's anxiety level.   - Monitor for signs and symptoms (heart palpitations, chest pain, shortness of breath, headaches, nausea, feeling jumpy, restlessness, irritable, apprehensive).   - Collaborate with interdisciplinary team and initiate plan and interventions as ordered.  - [place] patient to unit/surroundings  - Explain treatment plan  - Encourage participation in care  - Encourage verbalization of concerns/fears  - Identify coping mechanisms  - Assist in developing anxiety-reducing skills  - Administer/offer alternative therapies  - Limit or eliminate stimulants  Outcome: Not Progressing

## 2024-12-10 NOTE — NURSING NOTE
Patient was sitting in the hallway for morning medication administration. She was calm and cooperative. She denied anxiety or depression. Denied SI,HI, or hallucinations. She requires a lot of re-assurance. Medication compliant. Safety precautions maintained.

## 2024-12-10 NOTE — PROGRESS NOTES
Progress Note - Behavioral Health   Name: Luba Barajas 89 y.o. female I MRN: [id number]  Unit/Bed#: [facility]02 I Date of Admission: 11/26/2024   Date of Service: 12/10/2024 I Hospital Day: 14    Assessment & Plan  Generalized anxiety disorder  Continue Zoloft 100 mg daily  Continue Atarax 25 mg p.o. 3 times daily.  Increase as needed Klonopin to 0.5 mg twice daily as needed for anxiety  MDD (major depressive disorder), recurrent episode, severe (HCC)  Continue Zoloft 100 mg daily  HTN (hypertension)  Per medical  Mild neurocognitive disorder  Supportive    Progress Toward Goals: Showed some progress today.    Recommended Treatment: Continue with group therapy, milieu therapy and occupational therapy.      Risks, benefits and possible side effects of Medications:   Risks, benefits, and possible side effects of medications explained to patient and patient verbalizes understanding.      History of Present Illness   Behavior over the last 24 hours: Improving  Sleep: Improving  Appetite: Fair  Medication side effects: No  ROS: no complaints    Subjective:    Patient seen in room while sitting in a chair.  Patient seems less anxious overall.  She reports feeling less anxious today.  She feels much better than she did yesterday.  She did eventually get a partial shower but still says she was rather scared but she will attempt to do a whole shower next time.  Patient today was seen up and out and walking in the halls with walker.  Patient reports sleeping last night.  She has a fair appetite.  She is medication compliant.        Objective   Mental Status Evaluation:  Appearance:  age appropriate and casually dressed   Behavior:  Much more calm today and less fidgety and restless and more cooperative   Speech:  normal pitch and normal volume   Mood:  anxious and depressed   Affect:  constricted   Thought Process:  goal directed   Thought Content:  Patient does not verbalize any overt delusions or paranoia but she does  still have some negative thoughts and ruminations that are overwhelming   Perceptual Disturbances: Patient denies   Risk Potential: Suicidal Ideations none  Homicidal Ideations none  Potential for Aggression No   Sensorium:  person and place   Memory:  recent memory grossly intact   Consciousness:  alert and awake    Attention: attention span appeared shorter than expected for age   Insight:  limited   Judgment: limited   Gait/Station: Walking utilizing a walker   Motor Activity: no abnormal movements     Medications: all current active meds have been reviewed.      Lab Results: I have reviewed the following results:  Most Recent Labs:   Lab Results   Component Value Date    WBC 5.00 11/28/2024    RBC 4.16 11/28/2024    HGB 12.0 11/28/2024    HCT 37.6 11/28/2024     11/28/2024    RDW 14.3 11/28/2024    NEUTROABS 3.70 11/28/2024    SODIUM 140 12/05/2024    K 3.9 12/05/2024     12/05/2024    CO2 26 12/05/2024    BUN 18 12/05/2024    CREATININE 0.78 12/05/2024    GLUC 115 12/05/2024    GLUF 115 (H) 12/05/2024    CALCIUM 9.7 12/05/2024    AST 12 (L) 11/28/2024    ALT 8 11/28/2024    ALKPHOS 59 11/28/2024    TP 6.0 (L) 11/28/2024    ALB 3.7 11/28/2024    TBILI 0.74 11/28/2024    CHOLESTEROL 135 11/28/2024    HDL 58 11/28/2024    TRIG 72 11/28/2024    LDLCALC 63 11/28/2024    NONHDLC 77 11/28/2024    [id number] 0.934 11/26/2024    RPR Non-Reactive 03/11/2016       Administrative Statements   I have spent a total time of 25 minutes in caring for this patient on the day of the visit/encounter including Counseling / Coordination of care, Documenting in the medical record, Reviewing / ordering tests, medicine, procedures  , and Obtaining or reviewing history  . Topics discussed with the patient / family include symptom assessment and management.

## 2024-12-10 NOTE — PROGRESS NOTES
12/10/24   Team Meeting   Meeting Type Daily Rounds   Team Members Present   Team Members Present Physician;Nurse;   Physician Team Member Alan NEAL   Nursing Team Member Mahin LEÓN   Social Work Team Member Benjie URIOSTEGUI   Patient/Family Present   Patient Present No   Patient's Family Present No   201, severe anx, PRN trazodone effective, slept, fall risk, fall risk, uses walker, no d/c date med adjustments

## 2024-12-10 NOTE — TREATMENT TEAM
12/09/24 2259   Stacy Anxiety Scale   Anxious Mood 3   Tension 3   Fears 3   Insomnia 3   Intellectual 3   Depressed Mood 3   Somatic Complaints: Muscular 1   Somatic Complaints: Sensory 1   Cardiovascular Symptoms 1   Respiratory Symptoms 1   Gastrointestinal Symptoms 1   Genitourinary Symptoms 1   Autonomic Symptoms 1   Behavior at Interview 1   Stacy Anxiety Score 26     Trazodone 50 mg administered per pt's request for sever anxiety. Pt appears tearful and anxious. Stated that she's having trouble calming down and is always worrying. Will continue to monitor. Safety checks continued.

## 2024-12-10 NOTE — PROGRESS NOTES
"Progress Note - Luba Barajas 89 y.o. female MRN: [id number]    Unit/Bed#: [facility] 208-02 Encounter: [id number]        Subjective:   Patient seen and examined at bedside after reviewing the chart and discussing the case with the caring staff.      Patient examined at bedside.  Patient denies any acute symptoms.    Physical Exam   Vitals: Blood pressure 115/56, pulse 80, temperature 98.3 °F (36.8 °C), temperature source Temporal, resp. rate 16, height 5' 2\" (1.575 m), weight 61.5 kg (135 lb 9.6 oz), SpO2 94%.,Body mass index is 24.8 kg/m².  Constitutional: Patient in no acute distress.  HEENT: PERR, EOMI, MMM.  Cardiovascular: Normal rate and regular rhythm.  Pulmonary/Chest: Effort normal and breath sounds normal.  Abdomen: Soft, + BS, NT.    Assessment/Plan:  Luba Barajas is a(n) 89 y.o. female with MDD.     Cardiac with hx of HTN, HLD.  Continue lisinopril 10 mg daily and pravastatin 40 mg daily (rosuvastatin nonform).  Gait abnormality.  PT OT consulted.  Urinary frequency/incontinence.  Chronic and follows with GYN.  UA without evidence of UTI 11/26.  Tried medications for OAB in the past but stopped due to side effects.   Cutaneous candidiasis.  Apply nystatin powder twice daily.  Dysphagia.  SLP consulted.   Vitamin D/B12 deficiency.  Continue daily supplements.  Constipation.  Patient started on Senokot as 1 tablet daily along with MiraLAX as needed 11/28/24.  Dry nares.  Added saline nasal spray as needed 12/3.    The patient was discussed with Dr. Bailon and he is in agreement with the above note.  "

## 2024-12-11 PROCEDURE — 99232 SBSQ HOSP IP/OBS MODERATE 35: CPT | Performed by: PSYCHIATRY & NEUROLOGY

## 2024-12-11 RX ORDER — CLONAZEPAM 0.5 MG/1
0.25 TABLET ORAL 3 TIMES DAILY
Status: DISCONTINUED | OUTPATIENT
Start: 2024-12-11 | End: 2024-12-15

## 2024-12-11 RX ORDER — ARIPIPRAZOLE 2 MG/1
2 TABLET ORAL DAILY
Status: DISCONTINUED | OUTPATIENT
Start: 2024-12-11 | End: 2024-12-16

## 2024-12-11 RX ADMIN — CLONAZEPAM 0.25 MG: 0.5 TABLET ORAL at 14:27

## 2024-12-11 RX ADMIN — ARIPIPRAZOLE 2 MG: 2 TABLET ORAL at 14:27

## 2024-12-11 RX ADMIN — CLONAZEPAM 0.25 MG: 0.5 TABLET ORAL at 21:11

## 2024-12-11 RX ADMIN — DONEPEZIL HYDROCHLORIDE 5 MG: 5 TABLET ORAL at 21:11

## 2024-12-11 RX ADMIN — HYDROXYZINE HYDROCHLORIDE 25 MG: 25 TABLET ORAL at 08:22

## 2024-12-11 RX ADMIN — LISINOPRIL 10 MG: 10 TABLET ORAL at 08:22

## 2024-12-11 RX ADMIN — SENNOSIDES AND DOCUSATE SODIUM 1 TABLET: 8.6; 5 TABLET ORAL at 21:11

## 2024-12-11 RX ADMIN — SERTRALINE 100 MG: 100 TABLET, FILM COATED ORAL at 08:22

## 2024-12-11 RX ADMIN — CYANOCOBALAMIN TAB 500 MCG 1000 MCG: 500 TAB at 08:22

## 2024-12-11 RX ADMIN — PRAVASTATIN SODIUM 40 MG: 40 TABLET ORAL at 17:17

## 2024-12-11 RX ADMIN — Medication 2000 UNITS: at 08:22

## 2024-12-11 NOTE — PROGRESS NOTES
Progress Note - Behavioral Health   Name: Luba Barajas 89 y.o. female I MRN: [id number]  Unit/Bed#: [facility] 204-01 I Date of Admission: 11/26/2024   Date of Service: 12/11/2024 I Hospital Day: 15    Assessment & Plan  Generalized anxiety disorder  Continue Zoloft 100 mg daily  DC Atarax  Add Klonopin 0.25 mg 3 times daily for anxiety  Add Abilify 2 mg daily to help with depression and anxiety as well as paranoia  MDD (major depressive disorder), recurrent episode, severe (HCC)  Continue Zoloft 100 mg daily  Klonopin 0.25 milligrams 3 times daily for anxiety  HTN (hypertension)  Per medical  Mild neurocognitive disorder  Supportive care    Progress Toward Goals: Poor progress    Recommended Treatment: Continue with group therapy, milieu therapy and occupational therapy.      Risks, benefits and possible side effects of Medications:   Risks, benefits, and possible side effects of medications explained to patient and patient verbalizes understanding.      History of Present Illness   Behavior over the last 24 hours: Regressed  Sleep: Improving  Appetite: Adequate  Medication side effects: No  ROS: no complaints    Subjective:  Patient seen in small TV room.  Patient becomes increasingly visibly nervous anxious and scared on approach.  Patient also has increased hand shakiness on approach.  Patient speaks about how she feels others are against her.  She also felt that this writer was trying to get rid of her.  Her room was changed last evening and she felt like she did something wrong.  Patient is frequently tearful while talking to this writer.  In seeing patient out in the milieu the patient seems much more relaxed until interacted with by staff.  She is medication compliant          Objective   Mental Status Evaluation:  Appearance:  age appropriate and casually dressed   Behavior:  Anxious, scared tearfulness shaking hands   Speech:  normal pitch and normal volume   Mood:  anxious and depressed   Affect:  constricted  and labile   Thought Process:  goal directed, illogical, and patient is having difficulty verbalizing her thoughts to this writer which is frustrating for her   Thought Content:  Patient increasingly verbalizes paranoia as well as other negative ruminating thoughts that become overwhelming for her.   Perceptual Disturbances: Patient denies   Risk Potential: Suicidal Ideations none  Homicidal Ideations none  Potential for Aggression No   Sensorium:  person and place   Memory:  recent and remote memory: unable to assess due to lack of cooperation   Consciousness:  alert and awake    Attention: attention span appeared shorter than expected for age   Insight:  limited   Judgment: limited   Gait/Station: Patient uses a walker   Motor Activity: Patient will exhibit shaky hands at times due to anxiety     Medications: all current active meds have been reviewed.      Lab Results: I have reviewed the following results:  Most Recent Labs:   Lab Results   Component Value Date    WBC 5.00 11/28/2024    RBC 4.16 11/28/2024    HGB 12.0 11/28/2024    HCT 37.6 11/28/2024     11/28/2024    RDW 14.3 11/28/2024    NEUTROABS 3.70 11/28/2024    SODIUM 140 12/05/2024    K 3.9 12/05/2024     12/05/2024    CO2 26 12/05/2024    BUN 18 12/05/2024    CREATININE 0.78 12/05/2024    GLUC 115 12/05/2024    GLUF 115 (H) 12/05/2024    CALCIUM 9.7 12/05/2024    AST 12 (L) 11/28/2024    ALT 8 11/28/2024    ALKPHOS 59 11/28/2024    TP 6.0 (L) 11/28/2024    ALB 3.7 11/28/2024    TBILI 0.74 11/28/2024    CHOLESTEROL 135 11/28/2024    HDL 58 11/28/2024    TRIG 72 11/28/2024    LDLCALC 63 11/28/2024    NONHDLC 77 11/28/2024    [id number] 0.934 11/26/2024    RPR Non-Reactive 03/11/2016       Administrative Statements   I have spent a total time of 25 minutes in caring for this patient on the day of the visit/encounter including Counseling / Coordination of care, Documenting in the medical record, Reviewing / ordering tests, medicine,  procedures  , and Obtaining or reviewing history  . Topics discussed with the patient / family include symptom assessment and management.

## 2024-12-11 NOTE — PROGRESS NOTES
"Progress Note - Luba Barajas 89 y.o. female MRN: [id number]    Unit/Bed#: -01 Encounter: [id number]        Subjective:   Patient seen and examined at bedside after reviewing the chart and discussing the case with the caring staff.      Patient examined at bedside.  Patient denies any acute symptoms.    Physical Exam   Vitals: Blood pressure 160/70, pulse 71, temperature 97.5 °F (36.4 °C), temperature source Temporal, resp. rate 15, height 5' 2\" (1.575 m), weight 61.5 kg (135 lb 9.6 oz), SpO2 97%.,Body mass index is 24.8 kg/m².  Constitutional: Patient in no acute distress.  HEENT: PERR, EOMI, MMM.  Cardiovascular: Normal rate and regular rhythm.  Pulmonary/Chest: Effort normal and breath sounds normal.  Abdomen: Soft, + BS, NT.    Assessment/Plan:  Luba Barajas is a(n) 89 y.o. female with MDD.     Cardiac with hx of HTN, HLD.  Continue lisinopril 10 mg daily and pravastatin 40 mg daily (rosuvastatin nonform).  Gait abnormality.  PT OT consulted.  Urinary frequency/incontinence.  Chronic and follows with GYN.  UA without evidence of UTI 11/26.  Tried medications for OAB in the past but stopped due to side effects.   Cutaneous candidiasis.  Apply nystatin powder twice daily.  Dysphagia.  SLP consulted.   Vitamin D/B12 deficiency.  Continue daily supplements.  Constipation.  Patient started on Senokot as 1 tablet daily along with MiraLAX as needed 11/28/24.  Dry nares.  Added saline nasal spray as needed 12/3.    The patient was discussed with Dr. Bailon and he is in agreement with the above note.  "

## 2024-12-11 NOTE — NURSING NOTE
Patient has been visible on the unit. She is social with staff and peers. She requires a lot of reassurance. She appeared calm sitting in the dining room until the nurse approached her. She started shaking when nurse spoke to her. She slept but stated she was scared and anxious during morning medication administration. Denied SI,HI, or hallucinations. She ambulates with a walker independently. She was medication compliant. Safety precautions maintained.

## 2024-12-11 NOTE — PLAN OF CARE
Problem: PAIN - ADULT  Goal: Verbalizes/displays adequate comfort level or baseline comfort level  Description: Interventions:  - Encourage patient to monitor pain and request assistance  - Assess pain using appropriate pain scale  - Administer analgesics based on type and severity of pain and evaluate response  - Implement non-pharmacological measures as appropriate and evaluate response  - Consider cultural and social influences on pain and pain management  - Notify physician/advanced practitioner if interventions unsuccessful or patient reports new pain  Outcome: Progressing     Problem: Ineffective Coping  Goal: Demonstrates healthy coping skills  Outcome: Progressing  Goal: Participates in unit activities  Description: Interventions:  - Provide therapeutic environment   - Provide required programming   - Redirect inappropriate behaviors   Outcome: Progressing  Goal: Understands least restrictive measures  Description: Interventions:  - Utilize least restrictive behavior  Outcome: Progressing  Goal: Free from restraint events  Description: - Utilize least restrictive measures   - Provide behavioral interventions   - Redirect inappropriate behaviors   Outcome: Progressing     Problem: Depression  Goal: Verbalize thoughts and feelings  Description: Interventions:  - Assess and re-assess patient's level of risk   - Engage patient in 1:1 interactions, daily, for a minimum of 15 minutes   - Encourage patient to express feelings, fears, frustrations, hopes   Outcome: Progressing  Goal: Refrain from harming self  Description: Interventions:  - Monitor patient closely, per order   - Supervise medication ingestion, monitor effects and side effects   Outcome: Progressing     Problem: Anxiety  Goal: Anxiety is at manageable level  Description: Interventions:  - Assess and monitor patient's anxiety level.   - Monitor for signs and symptoms (heart palpitations, chest pain, shortness of breath, headaches, nausea, feeling  jumpy, restlessness, irritable, apprehensive).   - Collaborate with interdisciplinary team and initiate plan and interventions as ordered.  - [place] patient to unit/surroundings  - Explain treatment plan  - Encourage participation in care  - Encourage verbalization of concerns/fears  - Identify coping mechanisms  - Assist in developing anxiety-reducing skills  - Administer/offer alternative therapies  - Limit or eliminate stimulants  Outcome: Progressing     Problem: Nutrition/Hydration-ADULT  Goal: Nutrient/Hydration intake appropriate for improving, restoring or maintaining nutritional needs  Description: Monitor and assess patient's nutrition/hydration status for malnutrition. Collaborate with interdisciplinary team and initiate plan and interventions as ordered.  Monitor patient's weight and dietary intake as ordered or per policy. Utilize nutrition screening tool and intervene as necessary. Determine patient's food preferences and provide high-protein, high-caloric foods as appropriate.     INTERVENTIONS:  - Monitor oral intake, urinary output, labs, and treatment plans  - Assess nutrition and hydration status and recommend course of action  - Evaluate amount of meals eaten  - Assist patient with eating if necessary   - Allow adequate time for meals  - Recommend/ encourage appropriate diets, oral nutritional supplements, and vitamin/mineral supplements  - Order, calculate, and assess calorie counts as needed  - Recommend, monitor, and adjust tube feedings and TPN/PPN based on assessed needs  - Assess need for intravenous fluids  - Provide specific nutrition/hydration education as appropriate  - Include patient/family/caregiver in decisions related to nutrition  Outcome: Progressing     Problem: Potential for Falls  Goal: Patient will remain free of falls  Description: Safety Plan- High Fall Risk    The Patient:  - Is identified on the bed board as a high fall risk: Yes   - Has yellow socks and band applied: Yes    - Requires this level of observation: Increased Rounding  - Requires staff assistance with showering  - Is on a toileting schedule: No  - Uses the following assistive device: Rolling Walker  - Requires alarm(s) on bed  - Has a call bell within reach: Yes   - Has a tap bell in place: na  - Requires a bedside commode and/or urinal: No  - Has been considered for appropriate room and bed placement: Yes   - Has been reported to treatment team as a high risk for falls: Yes      Outcome: Progressing

## 2024-12-11 NOTE — PROGRESS NOTES
12/11/24   Team Meeting   Meeting Type Daily Rounds   Team Members Present   Team Members Present Physician;Nurse;Occupational Therapist;   Physician Team Member Alan NEAL   Nursing Team Member Mahin LEÓN   Social Work Team Member Benjie URIOSTEGUI   OT Team Member Joshua CTRS   Patient/Family Present   Patient Present No   Patient's Family Present No   201, high anx/scared, fall risk, assistance in shower, denies si/hi/avh/dep, med compliant, no d/c date med adjustments

## 2024-12-11 NOTE — NURSING NOTE
"Patient was withdrawn to her room this evening; observed to be resting quietly in bed.  Declined attendance to evening snack time, stating she was tired. She is easily arousible.  At time of assessment, patient informs her anxiety and depression are \"ok\"; she presents as calm, no hand/ arm tremors observed.  She denies SI, HI and hallucinations.  Luba was medication compliant with PO medications,  however she did decline Calmoseptine and Nystatin applications that were scheduled for 1800; offered at HS, stating \"I don't need those.\"  No complaints voiced.  Utilizes a walker for mobility.  Continuous safety rounding in progress.   "

## 2024-12-12 PROCEDURE — 99232 SBSQ HOSP IP/OBS MODERATE 35: CPT | Performed by: PSYCHIATRY & NEUROLOGY

## 2024-12-12 RX ADMIN — CLONAZEPAM 0.25 MG: 0.5 TABLET ORAL at 08:48

## 2024-12-12 RX ADMIN — CYANOCOBALAMIN TAB 500 MCG 1000 MCG: 500 TAB at 08:48

## 2024-12-12 RX ADMIN — CLONAZEPAM 0.25 MG: 0.5 TABLET ORAL at 16:36

## 2024-12-12 RX ADMIN — PRAVASTATIN SODIUM 40 MG: 40 TABLET ORAL at 16:37

## 2024-12-12 RX ADMIN — DONEPEZIL HYDROCHLORIDE 5 MG: 5 TABLET ORAL at 21:07

## 2024-12-12 RX ADMIN — CLONAZEPAM 0.25 MG: 0.5 TABLET ORAL at 21:07

## 2024-12-12 RX ADMIN — Medication 2000 UNITS: at 08:47

## 2024-12-12 RX ADMIN — SERTRALINE 100 MG: 100 TABLET, FILM COATED ORAL at 08:47

## 2024-12-12 RX ADMIN — LISINOPRIL 10 MG: 10 TABLET ORAL at 08:48

## 2024-12-12 RX ADMIN — SENNOSIDES AND DOCUSATE SODIUM 1 TABLET: 8.6; 5 TABLET ORAL at 21:07

## 2024-12-12 RX ADMIN — ARIPIPRAZOLE 2 MG: 2 TABLET ORAL at 08:47

## 2024-12-12 NOTE — NURSING NOTE
Patient has been cooperative with medications. Patient is still endorsing high anxiety, mild depression and is stating she is scared. Patient is reminded that she has to use her coping skills. Patient is unsure of why she is scared but she worries a lot and ruminates on things out of her control. Patient is calm until she is approached. Patient still has tremors due to the anxiety. Patient did come down to the dining room this morning to eat breakfast. Patient is able to make needs known. Continuous safety monitoring in place.

## 2024-12-12 NOTE — ASSESSMENT & PLAN NOTE
Continue Zoloft 100 mg daily  Continue Klonopin 0.25 mg 3 times daily for anxiety  Continue Abilify 2 mg daily to help with depression and anxiety as well as paranoia

## 2024-12-12 NOTE — ASSESSMENT & PLAN NOTE
Continue Zoloft 100 mg daily  Continue Klonopin 0.25 mg 3 times daily for anxiety   Notification Instructions: Patient will be notified of biopsy results. However, patient instructed to call the office if not contacted within 2 weeks.

## 2024-12-12 NOTE — PROGRESS NOTES
"Progress Note - Luba Barajas 89 y.o. female MRN: [id number]    Unit/Bed#: -01 Encounter: [id number]        Subjective:   Patient seen and examined at bedside after reviewing the chart and discussing the case with the caring staff.      Patient examined at bedside.  Patient denies any acute symptoms.  She states she thought [place] was yesterday.      Physical Exam   Vitals: Blood pressure 164/92, pulse 78, temperature 97.6 °F (36.4 °C), temperature source Temporal, resp. rate 16, height 5' 2\" (1.575 m), weight 61.5 kg (135 lb 9.6 oz), SpO2 95%.,Body mass index is 24.8 kg/m².  Constitutional: Patient in no acute distress.  HEENT: PERR, EOMI, MMM.  Cardiovascular: Normal rate and regular rhythm.  Pulmonary/Chest: Effort normal and breath sounds normal.  Abdomen: Soft, + BS, NT.    Assessment/Plan:  Luba Barajas is a(n) 89 y.o. female with MDD.     Cardiac with hx of HTN, HLD.  Continue lisinopril 10 mg daily and pravastatin 40 mg daily (rosuvastatin nonform).  Gait abnormality.  PT OT consulted.  Urinary frequency/incontinence.  Chronic and follows with GYN.  UA without evidence of UTI 11/26.  Tried medications for OAB in the past but stopped due to side effects.   Cutaneous candidiasis.  Apply nystatin powder twice daily.  Dysphagia.  SLP consulted.   Vitamin D/B12 deficiency.  Continue daily supplements.  Constipation.  Patient started on Senokot as 1 tablet daily along with MiraLAX as needed 11/28/24.  Dry nares.  Added saline nasal spray as needed 12/3.    The patient was discussed with Dr. Bailon and he is in agreement with the above note.  "

## 2024-12-12 NOTE — NURSING NOTE
Patient was withdrawn to her room this evening; observed to be resting quietly in bed.  During interaction with nursing staff, patient is tearful, trembling and states she is afraid and does not know what to do.  She informs she has not been eating because she does not want to; asks this writer if she still needs to take her medications.  Administered HS medications (PO) without issue.  Patient declines scheduled Nystatin and Calmoseptine applications, stating she does not need them.  Ambulates well with walker.  Declines evening snack when offered. Continuous safety rounding in progress.

## 2024-12-12 NOTE — PROGRESS NOTES
Progress Note - Behavioral Health   Name: Luba Barajas 89 y.o. female I MRN: [id number]  Unit/Bed#: [facility] 204-01 I Date of Admission: 11/26/2024   Date of Service: 12/12/2024 I Hospital Day: 16    Assessment & Plan  Generalized anxiety disorder  Continue Zoloft 100 mg daily  Continue Klonopin 0.25 mg 3 times daily for anxiety  Continue Abilify 2 mg daily to help with depression and anxiety as well as paranoia  MDD (major depressive disorder), recurrent episode, severe (HCC)  Continue Zoloft 100 mg daily  Continue Klonopin 0.25 mg 3 times daily for anxiety  HTN (hypertension)  Per medical  Mild neurocognitive disorder  Supportive care    Progress Toward Goals: Unchanged    Recommended Treatment: Continue with group therapy, milieu therapy and occupational therapy.      Risks, benefits and possible side effects of Medications:   Risks, benefits, and possible side effects of medications explained to patient and patient verbalizes understanding.      History of Present Illness   Behavior over the last 24 hours:  unchanged  Sleep: normal  Appetite: Adequate  Medication side effects: No  ROS: no complaints    Subjective:    Patient seen in room sitting on bed.  She is overall pleasant and cooperative but still very anxious.  She is attempting to sort out her clothing that was brought to her.  She is having difficulty deciding which ones to wear and wishes she could hire somebody to do this for.  Patient did not seem as distraught or tearful today as yesterday on this regimen of medication.  She still is able to say that she is scared and worries a lot about things that she has no control over.  She is medication compliant          Objective   Mental Status Evaluation:  Appearance:  age appropriate and casually dressed   Behavior:  Anxious, scared but remains calm and cooperative over all.   Speech:  normal pitch and normal volume   Mood:  anxious and depressed   Affect:  constricted and labile   Thought Process:  goal  directed, illogical, and pt does have trouble finding words for her thoughts at times which is frustrating for her    Thought Content:  Patient verbalizes paranoia and other negative ruminating thoughts which are all overwhelming for her   Perceptual Disturbances: Patient denies     Risk Potential: Suicidal Ideations none  Homicidal Ideations none  Potential for Aggression No   Sensorium:  person and place   Memory:  recent and remote memory: unable to assess due to lack of cooperation   Consciousness:  awake    Attention: attention span appeared shorter than expected for age   Insight:  limited   Judgment: limited   Gait/Station: Sitting on bed   Motor Activity: no abnormal movements open does get tremulous when becoming anxious     Medications: all current active meds have been reviewed.      Lab Results: I have reviewed the following results:  Most Recent Labs:   Lab Results   Component Value Date    WBC 5.00 11/28/2024    RBC 4.16 11/28/2024    HGB 12.0 11/28/2024    HCT 37.6 11/28/2024     11/28/2024    RDW 14.3 11/28/2024    NEUTROABS 3.70 11/28/2024    SODIUM 140 12/05/2024    K 3.9 12/05/2024     12/05/2024    CO2 26 12/05/2024    BUN 18 12/05/2024    CREATININE 0.78 12/05/2024    GLUC 115 12/05/2024    GLUF 115 (H) 12/05/2024    CALCIUM 9.7 12/05/2024    AST 12 (L) 11/28/2024    ALT 8 11/28/2024    ALKPHOS 59 11/28/2024    TP 6.0 (L) 11/28/2024    ALB 3.7 11/28/2024    TBILI 0.74 11/28/2024    CHOLESTEROL 135 11/28/2024    HDL 58 11/28/2024    TRIG 72 11/28/2024    LDLCALC 63 11/28/2024    NONHDLC 77 11/28/2024    [id number] 0.934 11/26/2024    RPR Non-Reactive 03/11/2016       Administrative Statements   I have spent a total time of 25 minutes in caring for this patient on the day of the visit/encounter including Counseling / Coordination of care, Documenting in the medical record, Reviewing / ordering tests, medicine, procedures  , and Obtaining or reviewing history  . Topics discussed with  the patient / family include symptom assessment and management.

## 2024-12-12 NOTE — PLAN OF CARE
Problem: PAIN - ADULT  Goal: Verbalizes/displays adequate comfort level or baseline comfort level  Description: Interventions:  - Encourage patient to monitor pain and request assistance  - Assess pain using appropriate pain scale  - Administer analgesics based on type and severity of pain and evaluate response  - Implement non-pharmacological measures as appropriate and evaluate response  - Consider cultural and social influences on pain and pain management  - Notify physician/advanced practitioner if interventions unsuccessful or patient reports new pain  Outcome: Progressing     Problem: Depression  Goal: Verbalize thoughts and feelings  Description: Interventions:  - Assess and re-assess patient's level of risk   - Engage patient in 1:1 interactions, daily, for a minimum of 15 minutes   - Encourage patient to express feelings, fears, frustrations, hopes   Outcome: Progressing  Goal: Refrain from harming self  Description: Interventions:  - Monitor patient closely, per order   - Supervise medication ingestion, monitor effects and side effects   Outcome: Progressing     Problem: Nutrition/Hydration-ADULT  Goal: Nutrient/Hydration intake appropriate for improving, restoring or maintaining nutritional needs  Description: Monitor and assess patient's nutrition/hydration status for malnutrition. Collaborate with interdisciplinary team and initiate plan and interventions as ordered.  Monitor patient's weight and dietary intake as ordered or per policy. Utilize nutrition screening tool and intervene as necessary. Determine patient's food preferences and provide high-protein, high-caloric foods as appropriate.     INTERVENTIONS:  - Monitor oral intake, urinary output, labs, and treatment plans  - Assess nutrition and hydration status and recommend course of action  - Evaluate amount of meals eaten  - Assist patient with eating if necessary   - Allow adequate time for meals  - Recommend/ encourage appropriate diets,  oral nutritional supplements, and vitamin/mineral supplements  - Order, calculate, and assess calorie counts as needed  - Recommend, monitor, and adjust tube feedings and TPN/PPN based on assessed needs  - Assess need for intravenous fluids  - Provide specific nutrition/hydration education as appropriate  - Include patient/family/caregiver in decisions related to nutrition  Outcome: Progressing     Problem: Potential for Falls  Goal: Patient will remain free of falls  Description: Safety Plan- High Fall Risk    The Patient:  - Is identified on the bed board as a high fall risk: Yes   - Has yellow socks and band applied: Yes   - Requires this level of observation: Increased Rounding  - Requires staff assistance with showering  - Is on a toileting schedule: No  - Uses the following assistive device: Rolling Walker  - Requires alarm(s) on bed  - Has a call bell within reach: Yes   - Has a tap bell in place: na  - Requires a bedside commode and/or urinal: No  - Has been considered for appropriate room and bed placement: Yes   - Has been reported to treatment team as a high risk for falls: Yes      Outcome: Progressing

## 2024-12-12 NOTE — PROGRESS NOTES
12/12/24   Team Meeting   Meeting Type Daily Rounds   Team Members Present   Team Members Present Physician;Occupational Therapist;Nurse;   Physician Team Member Alan SORENSON   Nursing Team Member Hannah LEÓN   Social Work Team Member Benije URIOSTEGUI   OT Team Member Joshua LEWIS   Patient/Family Present   Patient Present No   Patient's Family Present No   201, high anx/dep, no d/c date med adjustments, paranoid, tremors, med compliant, eating however stating she is not eating

## 2024-12-13 PROCEDURE — 99232 SBSQ HOSP IP/OBS MODERATE 35: CPT

## 2024-12-13 RX ADMIN — Medication 2000 UNITS: at 08:34

## 2024-12-13 RX ADMIN — SERTRALINE 100 MG: 100 TABLET, FILM COATED ORAL at 08:34

## 2024-12-13 RX ADMIN — CLONAZEPAM 0.25 MG: 0.5 TABLET ORAL at 20:42

## 2024-12-13 RX ADMIN — CLONAZEPAM 0.25 MG: 0.5 TABLET ORAL at 08:35

## 2024-12-13 RX ADMIN — SENNOSIDES AND DOCUSATE SODIUM 1 TABLET: 8.6; 5 TABLET ORAL at 20:42

## 2024-12-13 RX ADMIN — CLONAZEPAM 0.25 MG: 0.5 TABLET ORAL at 16:11

## 2024-12-13 RX ADMIN — LISINOPRIL 10 MG: 10 TABLET ORAL at 08:35

## 2024-12-13 RX ADMIN — ARIPIPRAZOLE 2 MG: 2 TABLET ORAL at 08:34

## 2024-12-13 RX ADMIN — DONEPEZIL HYDROCHLORIDE 5 MG: 5 TABLET ORAL at 20:41

## 2024-12-13 RX ADMIN — CYANOCOBALAMIN TAB 500 MCG 1000 MCG: 500 TAB at 08:34

## 2024-12-13 RX ADMIN — PRAVASTATIN SODIUM 40 MG: 40 TABLET ORAL at 16:12

## 2024-12-13 RX ADMIN — TRAZODONE HYDROCHLORIDE 50 MG: 50 TABLET ORAL at 01:22

## 2024-12-13 NOTE — NUTRITION
24 1402   Biochemical Data,Medical Tests, and Procedures   Biochemical Data/Medical Tests/Procedures Lab values reviewed;Meds reviewed   Labs (Comment)  pro:6.0.  B   Meds (Comment) Vit D, klonopin, Vit B12, haldol, atarax, zestril, pravachol, inderal, desyrel   Nutrition-Focused Physical Exam   Nutrition-Focused Physical Exam Findings RN skin assessment reviewed;No skin issues documented   Medical-Related Concerns PMH reviewed   Adequacy of Intake   Nutrition Modality PO   Feeding Route   PO Independent   Current PO Intake   Current Diet Order Regular, chopped meats, small portions thin liquids   Current Meal Intake %;0-25%   Estimated calorie intake compared to estimated need Appears to be meeting minimum nutrient needs.   PES Statement   Problem Continue previous diagnosis   Recommendations/Interventions   Malnutrition/BMI Present No  (does not meet criteria)   Summary Regualr chopped meats, small portions thin liquids. Meal completions mostly %, occasionally less.  139.6#, obtained by writer.  135#. Questions accuracy of admission weight  152#. Will monitor weight status. Medicaitons reviewed. Oriented to person/place. LBM . Skin intact. Tremors present. Reprots things have been going Okay with meals and she does not like a lot of food. Monitor need for nutrition supplement.   Interventions/Recommendations Continue current diet order   Education Assessment   Education Education not indicated at this time   Patient Nutrition Goals   Goal Avoid weight loss;Meet PO needs   Goal Status Extended   Timeframe to complete goal by next f/u   Nutrition Complexity Risk   Nutrition complexity level Moderate risk   Follow up date 24

## 2024-12-13 NOTE — NURSING NOTE
Patient was very anxious when she got up today. Patient was confused and stated that she had dreams/nightmares and woke up not knowing what day it was. Patient stated that she did not eat lunch or dinner, thinking it was still the day prior. Nurse had to reassure patient that it was a new day and kept telling her over and over what time It was. Patient stated she felt scared but did not know why and was trembling. Patient was walked down to the dining room and ate breakfast. Patient was worried about not getting washed up the day before and was reassured that she would have all of the things she needed for a shower after breakfast. Nurse helped patient set up the shower and patient took a shower by herself. Patient then calmed down and went to group. Continuous safety monitoring in place.

## 2024-12-13 NOTE — TREATMENT TEAM
12/13/24 0122   Stacy Anxiety Scale   Anxious Mood 3   Tension 3   Fears 3   Insomnia 3   Intellectual 2   Depressed Mood 2   Somatic Complaints: Muscular 2   Somatic Complaints: Sensory 0   Cardiovascular Symptoms 0   Respiratory Symptoms 0   Gastrointestinal Symptoms 0   Genitourinary Symptoms 0   Autonomic Symptoms 3   Behavior at Interview 4   Stacy Anxiety Score 25       Trazodone 50 given for a Stacy of 25. Pt is trembling, tearful and afraid. Will reassess.

## 2024-12-13 NOTE — NURSING NOTE
Patient withdrawn to her room, pleasant, cooperative and med compliant with HS meds. She did not participate in a snack this evening and endorses high anxiety low depression. No SI, HI, AVH. Continuous 15 min safety checks in place.

## 2024-12-13 NOTE — PLAN OF CARE
Problem: Ineffective Coping  Goal: Participates in unit activities  Description: Interventions:  - Provide therapeutic environment   - Provide required programming   - Redirect inappropriate behaviors   Outcome: Progressing     Problem: Anxiety  Goal: Anxiety is at manageable level  Description: Interventions:  - Assess and monitor patient's anxiety level.   - Monitor for signs and symptoms (heart palpitations, chest pain, shortness of breath, headaches, nausea, feeling jumpy, restlessness, irritable, apprehensive).   - Collaborate with interdisciplinary team and initiate plan and interventions as ordered.  - [place] patient to unit/surroundings  - Explain treatment plan  - Encourage participation in care  - Encourage verbalization of concerns/fears  - Identify coping mechanisms  - Assist in developing anxiety-reducing skills  - Administer/offer alternative therapies  - Limit or eliminate stimulants  Outcome: Not Progressing

## 2024-12-13 NOTE — ASSESSMENT & PLAN NOTE
Continue Zoloft 100 mg daily  Continue Klonopin 0.25 mg 3 times daily for anxiety.   Patient states some confusion this morning, however tells this writer that she feels this is working well for her specifically her nightly dose.   Discussed decreasing to BID dosing however patient would like to continue for one more day to evaluate effectiveness/monitor side effects.  Continue Abilify 2 mg daily to help with depression and anxiety as well as paranoia

## 2024-12-13 NOTE — PLAN OF CARE
Problem: Nutrition/Hydration-ADULT  Goal: Nutrient/Hydration intake appropriate for improving, restoring or maintaining nutritional needs  Description: Monitor and assess patient's nutrition/hydration status for malnutrition. Collaborate with interdisciplinary team and initiate plan and interventions as ordered.  Monitor patient's weight and dietary intake as ordered or per policy. Utilize nutrition screening tool and intervene as necessary. Determine patient's food preferences and provide high-protein, high-caloric foods as appropriate.     INTERVENTIONS:  - Monitor oral intake, urinary output, labs, and treatment plans  - Assess nutrition and hydration status and recommend course of action  - Evaluate amount of meals eaten  - Assist patient with eating if necessary   - Allow adequate time for meals  - Recommend/ encourage appropriate diets, oral nutritional supplements, and vitamin/mineral supplements  - Order, calculate, and assess calorie counts as needed  - Recommend, monitor, and adjust tube feedings and TPN/PPN based on assessed needs  - Assess need for intravenous fluids  - Provide specific nutrition/hydration education as appropriate  - Include patient/family/caregiver in decisions related to nutrition  Outcome: Progressing      +right hip pain/JOINT PAIN

## 2024-12-13 NOTE — PROGRESS NOTES
Progress Note - Behavioral Health   Name: Luba Barajas 89 y.o. female I MRN: [id number]  Unit/Bed#: [facility] 204-01 I Date of Admission: 11/26/2024   Date of Service: 12/13/2024 I Hospital Day: 17     Assessment & Plan  Generalized anxiety disorder  Continue Zoloft 100 mg daily  Continue Klonopin 0.25 mg 3 times daily for anxiety.   Patient states some confusion this morning, however tells this writer that she feels this is working well for her specifically her nightly dose.   Discussed decreasing to BID dosing however patient would like to continue for one more day to evaluate effectiveness/monitor side effects.  Continue Abilify 2 mg daily to help with depression and anxiety as well as paranoia  MDD (major depressive disorder), recurrent episode, severe (HCC)  Continue Zoloft 100 mg daily  Continue Klonopin 0.25 mg 3 times daily for anxiety  HTN (hypertension)  Per medical  Mild neurocognitive disorder  Supportive care    Planned medication and treatment changes:    All current active medications have been reviewed  Encourage group therapy, milieu therapy and occupational therapy  Behavioral Health checks for safety monitoring  Continue current medications:  Continue to monitor for side effects.    Behavior over the last 24 hours: unchanged.     Luba is seen today for psychiatric follow up. Per nursing notes, patient cooperative, withdrawn at times, endorses high anxiety and low depression. Received Trazodone 50mg PO qHS for trembling/afraid, effective and slept. Had an episode of anxiety/confusion initially when waking up but was reassured and ended up going to group.  Patient remains in behavioral control.     Today Luba was seen walking to group, and brightened on approach.  She was calm and cooperative, and pleasant.  She appears to be in good spirits when speaking to this writer, but did note an episode of confusion when waking up this morning.  She notes some concern with this, however tells this writer that  she feels the Klonopin has been very helpful.  Discussed decreasing Klonopin to twice daily dosing with 1 dose at nighttime, due to her stating nighttime dose is very effective.  Patient requests to continue dosing for 1 more day to see if she feels like this again in the morning.  She was educated at length about tolerance, dependence, confusion, and possible cognitive dulling and that this is a short-term medication.    She denies suicidal and homicidal ideations.  Denies auditory visual hallucinations when asked.  Notes mild depression, endorses anxiety however less so when speaking with her.  She frequently smiles throughout conversation before group.  Notes she was able to take a shower by herself.    Sleep:  Notes dreams, however states she slept okay  Appetite: fair  Medication side effects: Yes - Notes confusion this morning, will continue to monitor with possible decrease    ROS: no complaints    Mental Status Evaluation:    Appearance:  age appropriate, casually dressed, adequate grooming   Behavior:  pleasant, cooperative, calm, a bit restless and fidgety   Speech:  normal rate and volume, clear   Mood:  anxious   Affect:  constricted, mood-congruent, brighter today   Thought Process:  Goal directed with some illogical thoughts   Associations: perseverative   Thought Content:  some paranoia, negative thinking, ruminating thoughts   Perceptual Disturbances: denies auditory hallucinations when asked, does not appear responding to internal stimuli, denies visual hallucinations when asked   Risk Potential: Suicidal ideation - None  Homicidal ideation - None  Potential for aggression - No   Sensorium:  oriented to person and place   Memory:  recent and remote memory: unable to assess due to lack of cooperation   Consciousness:  alert and awake   Attention/Concentration: attention span and concentration appear shorter than expected for age   Insight:  limited   Judgment: limited   Gait/Station: uses walker    Motor Activity: no abnormal movements     Vital signs in last 24 hours:    Temp:  [97.6 °F (36.4 °C)-98.7 °F (37.1 °C)] 97.6 °F (36.4 °C)  HR:  [64-71] 71  BP: (110-145)/(56-62) 145/62  Resp:  [17-18] 17  SpO2:  [95 %-97 %] 96 %  O2 Device: None (Room air)    Laboratory results: I have personally reviewed all pertinent laboratory/tests results    Results from the past 24 hours: No results found for this or any previous visit (from the past 24 hours).    Progress Toward Goals: progressing, despite feeling confused initially this morning patient does appear to be brighter and less anxious when speaking with this writer, attending group, we will continue to monitor anxiety and effectiveness of benzo.  Less paranoid today, tolerating Abilify.    Assessment & Plan   Principal Problem:    MDD (major depressive disorder), recurrent episode, severe (HCC)  Active Problems:    Generalized anxiety disorder    HTN (hypertension)    Mild neurocognitive disorder        Current Facility-Administered Medications   Medication Dose Route Frequency Provider Last Rate    acetaminophen  650 mg Oral Q4H PRN Myrna Caldwell MD      acetaminophen  650 mg Oral Q4H PRN Myrna Caldwell MD      acetaminophen  975 mg Oral Q6H PRN Myrna Caldwell MD      aluminum-magnesium hydroxide-simethicone  30 mL Oral Q4H PRN Myrna Caldwell MD      ARIPiprazole  2 mg Oral Daily Sesar A Prayson, DO      Cholecalciferol  2,000 Units Oral Daily Clare Brennan PA-C      clonazePAM  0.25 mg Oral TID Sesar A Prayson, DO      clonazePAM  0.5 mg Oral BID PRN Sesar A Prayson, DO      cyanocobalamin  1,000 mcg Oral Daily Clare Brennan PA-C      donepezil  5 mg Oral HS VAL Muñiz      haloperidol lactate  5 mg Intramuscular Q4H PRN Max 4/day Myrna Caldwell MD      hydrOXYzine HCL  25 mg Oral Q6H PRN Max 4/day Myrna Caldwell MD      hydrOXYzine HCL  50 mg Oral Q6H PRN Max 4/day Myrna Caldwell MD      lisinopril  10 mg  Oral Daily Clare Brennan PA-C      menthol-zinc oxide   Topical BID Clare Brennan PA-C      nicotine polacrilex  4 mg Oral Q2H PRN Myrna Caldwell MD      nystatin   Topical BID Clare Brennan PA-C      polyethylene glycol  17 g Oral Daily PRN Royce Bailon MD      pravastatin  40 mg Oral Daily With Dinner Clare Brennan PA-C      propranolol  5 mg Oral Q8H PRN Myrna Caldwell MD      risperiDONE  0.25 mg Oral Q4H PRN Max 6/day Myrna Caldwell MD      risperiDONE  0.5 mg Oral Q4H PRN Max 3/day Myrna Caldwell MD      risperiDONE  1 mg Oral Q2H PRN Max 3/day Myrna Caldwell MD      senna-docusate sodium  1 tablet Oral HS Royce Bailon MD      sertraline  100 mg Oral Daily Renard Keyes MD      sodium chloride  2 spray Each Nare Q1H PRN Clare Brennan PA-C      traZODone  50 mg Oral Q6H PRN Max 3/day Myrna Caldwell MD       Risks / Benefits of Treatment:    Risks, benefits, and possible side effects of medications explained to patient and patient verbalizes understanding and agreement for treatment.    Counseling / Coordination of Care:    Patient's progress discussed with staff in treatment team meeting.  Medication changes discussed with patient.  Medication education provided to patient.  Educated on importance of medication and treatment compliance.  Reassurance and supportive therapy provided.  Group attendance encouraged.    Diego Chua PA-C 12/13/24

## 2024-12-13 NOTE — PROGRESS NOTES
12/13/24    Team Meeting   Meeting Type Daily Rounds   Team Members Present   Team Members Present Physician;Nurse;;Occupational Therapist   Physician Team Member Alan CONWAY   Nursing Team Member Hannah LEÓN   Social Work Team Member Benjie URIOSTEGUI   OT Team Member Joshua CTRS   Patient/Family Present   Patient Present No   Patient's Family Present No   201, high anx/dep, tremors, PRN withdrawn to room, fearful, compliant with meds, pleasant, no d/c date med  adjustments, confused and disorientated

## 2024-12-13 NOTE — NURSING NOTE
"Patient is anxious and trembling. 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 Nurse reminded patient that she is a patient here and that she is getting help for her anxiety/depression/trembles. Patient stated \"I know.\" Patient is focused on showering and being afraid of not knowing what to do. Patient took a shower this morning by herself, nurse just helped her organize her things. Patient took her afternoon medications. Continuous safety monitoring in place.   "

## 2024-12-13 NOTE — NURSING NOTE
Patient appears to have slept through the majority of the overnight hours. Trazodone appear to be effective. No concerns voiced, no signs of distress, continuous 15 min safety checks in progress.

## 2024-12-14 PROCEDURE — 99232 SBSQ HOSP IP/OBS MODERATE 35: CPT

## 2024-12-14 RX ADMIN — CLONAZEPAM 0.25 MG: 0.5 TABLET ORAL at 21:25

## 2024-12-14 RX ADMIN — LISINOPRIL 10 MG: 10 TABLET ORAL at 08:21

## 2024-12-14 RX ADMIN — NYSTATIN 1 APPLICATION: 100000 POWDER TOPICAL at 08:23

## 2024-12-14 RX ADMIN — NYSTATIN 1 APPLICATION: 100000 POWDER TOPICAL at 17:02

## 2024-12-14 RX ADMIN — SENNOSIDES AND DOCUSATE SODIUM 1 TABLET: 8.6; 5 TABLET ORAL at 21:25

## 2024-12-14 RX ADMIN — DONEPEZIL HYDROCHLORIDE 5 MG: 5 TABLET ORAL at 21:25

## 2024-12-14 RX ADMIN — CLONAZEPAM 0.25 MG: 0.5 TABLET ORAL at 08:21

## 2024-12-14 RX ADMIN — CLONAZEPAM 0.25 MG: 0.5 TABLET ORAL at 16:49

## 2024-12-14 RX ADMIN — PRAVASTATIN SODIUM 40 MG: 40 TABLET ORAL at 16:49

## 2024-12-14 RX ADMIN — SERTRALINE 100 MG: 100 TABLET, FILM COATED ORAL at 08:21

## 2024-12-14 RX ADMIN — Medication 2000 UNITS: at 08:21

## 2024-12-14 RX ADMIN — ARIPIPRAZOLE 2 MG: 2 TABLET ORAL at 08:21

## 2024-12-14 RX ADMIN — CYANOCOBALAMIN TAB 500 MCG 1000 MCG: 500 TAB at 08:21

## 2024-12-14 RX ADMIN — ANORECTAL OINTMENT 1 APPLICATION: 15.7; .44; 24; 20.6 OINTMENT TOPICAL at 08:23

## 2024-12-14 NOTE — NURSING NOTE
Patient had broken sleep, and appeared awake for majority of the night. No acute behaviors or concerns voiced. Will CTM. Q15 minute patient safety checks in progress.

## 2024-12-14 NOTE — PROGRESS NOTES
Progress Note - Behavioral Health   Name: Luba Mendoza y.o. female I MRN: [id number]  Unit/Bed#: [facility] 204-01 I Date of Admission: 11/26/2024   Date of Service: 12/14/2024 I Hospital Day: 18     Assessment & Plan  Generalized anxiety disorder  Continue Zoloft 100 mg daily  Continue Klonopin 0.25 mg 3 times daily for anxiety.    Continue Abilify 2 mg daily to help with depression and anxiety as well as paranoia  MDD (major depressive disorder), recurrent episode, severe (HCC)  Continue Zoloft 100 mg daily  Continue Klonopin 0.25 mg 3 times daily for anxiety  HTN (hypertension)  Per medical  Mild neurocognitive disorder  Supportive care      Progress Note - Behavioral Health   Luba Mendoza y.o. female MRN: [id number]  Unit/Bed#: [facility] 204-01 Encounter: [id number]    Assessment & Plan   Principal Problem:    MDD (major depressive disorder), recurrent episode, severe (HCC)  Active Problems:    Generalized anxiety disorder    HTN (hypertension)    Mild neurocognitive disorder      Subjective:Patient was seen today for continuation of care, records reviewed and  patient was discussed with the morning case review team.  Patient had difficulty sleeping last evening despite as needed trazodone, continues to remains highly anxious and has limited coping skills to help with anxiety.  She remains on Klonopin 0.25 mg 3 times daily for severe anxiety and appears to be tolerating without side effects at this time.  She denies feeling confusion today and does not appear confused during today's assessment.  She is visibly shaking with anxiety, continue current medication regimen, believes medication is helping, she is aware of side effects of medication and denies at this time.  Continues to report decreased depression and improving with length of stay.  She benefits from social interaction and attending group therapy.  Patient denies endorsing any suicidal or homicidal ideation. Does not seem to be experiencing any manic or  psychotic symptoms during our encounter.      Psychiatric Review of Systems:    Sleep: slept off and on  Appetite: fair  Medication side effects: No   ROS: no complaints    Vitals:  Vitals:    12/14/24 0747   BP: 151/72   Pulse: 69   Resp: 17   Temp: 97.5 °F (36.4 °C)   SpO2: 96%       Mental Status Evaluation:    Appearance:  age appropriate, casually dressed   Behavior:  pleasant, cooperative, calm   Speech:  normal rate and volume   Mood:  anxious   Affect:  constricted   Thought Process:  coherent, concrete   Associations: concrete associations   Thought Content:  no overt delusions   Perceptual Disturbances: no auditory hallucinations, no visual hallucinations   Risk Potential: Suicidal ideation - None  Homicidal ideation - None  Potential for aggression - No   Sensorium:  oriented to person, place, and time/date   Memory:  recent and remote memory grossly intact   Consciousness:  alert and awake   Attention: attention span and concentration appear shorter than expected for age   Insight:  limited   Judgment: limited   Gait/Station: uses walker   Motor Activity: no abnormal movements     Laboratory results:  I have personally reviewed all pertinent laboratory/tests results.  Most Recent Labs:   Lab Results   Component Value Date    WBC 5.00 11/28/2024    RBC 4.16 11/28/2024    HGB 12.0 11/28/2024    HCT 37.6 11/28/2024     11/28/2024    RDW 14.3 11/28/2024    NEUTROABS 3.70 11/28/2024    SODIUM 140 12/05/2024    K 3.9 12/05/2024     12/05/2024    CO2 26 12/05/2024    BUN 18 12/05/2024    CREATININE 0.78 12/05/2024    GLUC 115 12/05/2024    GLUF 115 (H) 12/05/2024    CALCIUM 9.7 12/05/2024    AST 12 (L) 11/28/2024    ALT 8 11/28/2024    ALKPHOS 59 11/28/2024    TP 6.0 (L) 11/28/2024    ALB 3.7 11/28/2024    TBILI 0.74 11/28/2024    CHOLESTEROL 135 11/28/2024    HDL 58 11/28/2024    TRIG 72 11/28/2024    LDLCALC 63 11/28/2024    NONHDLC 77 11/28/2024    [id number] 0.934 11/26/2024    RPR  Non-Reactive 03/11/2016         Recommended Treatment:     All current active medications have been reviewed  Encourage group therapy, milieu therapy and occupational therapy  Behavioral Health checks for safety monitoring  Continue treatment with group therapy, milieu therapy and occupational therapy  Continue current medications:  Current Facility-Administered Medications   Medication Dose Route Frequency Provider Last Rate    acetaminophen  650 mg Oral Q4H PRN Myrna Caldwell MD      acetaminophen  650 mg Oral Q4H PRN Myrna Caldwell MD      acetaminophen  975 mg Oral Q6H PRN Myrna Caldwell MD      aluminum-magnesium hydroxide-simethicone  30 mL Oral Q4H PRN Myrna Caldwell MD      ARIPiprazole  2 mg Oral Daily Sesar A Prayson, DO      Cholecalciferol  2,000 Units Oral Daily AGUILAR DouglasC      clonazePAM  0.25 mg Oral TID Sesar A Prayson, DO      clonazePAM  0.5 mg Oral BID PRN Sesar A Prayson, DO      cyanocobalamin  1,000 mcg Oral Daily AGUILAR DouglasC      donepezil  5 mg Oral HS VAL Muñiz      haloperidol lactate  5 mg Intramuscular Q4H PRN Max 4/day Myrna Caldwell MD      hydrOXYzine HCL  25 mg Oral Q6H PRN Max 4/day Myrna Caldwell MD      hydrOXYzine HCL  50 mg Oral Q6H PRN Max 4/day Myran Caldwell MD      lisinopril  10 mg Oral Daily Clare Brennan, PA-C      menthol-zinc oxide   Topical BID MAN Douglas-C      nicotine polacrilex  4 mg Oral Q2H PRN Myrna Caldwell MD      nystatin   Topical BID Clare Brennan, PA-C      polyethylene glycol  17 g Oral Daily PRN Royce Bailon MD      pravastatin  40 mg Oral Daily With Dinner AGUILAR DouglasC      propranolol  5 mg Oral Q8H PRN Myrna Caldwell MD      risperiDONE  0.25 mg Oral Q4H PRN Max 6/day Myrna Caldwell MD      risperiDONE  0.5 mg Oral Q4H PRN Max 3/day Myrna Caldwell MD      risperiDONE  1 mg Oral Q2H PRN Max 3/day Myrna Caldwell MD      senna-docusate sodium   1 tablet Oral  Royce Bailon MD      sertraline  100 mg Oral Daily Renard Keyes MD      sodium chloride  2 spray Each Nare Q1H PRN Clare Brennan PA-C      traZODone  50 mg Oral Q6H PRN Max 3/day Myrna Caldwell MD         Risks / Benefits of Treatment:     Risks, benefits, and possible side effects of medications explained to patient. Patient has limited understanding of risks and benefits of treatment at this time, but agrees to take medications as prescribed.    Counseling / Coordination of Care:     Patient's progress reviewed with nursing staff.  Medications, treatment progress and treatment plan reviewed with patient.  Supportive counseling provided to the patient.          VAL Goetz

## 2024-12-14 NOTE — PLAN OF CARE
Problem: Depression  Goal: Verbalize thoughts and feelings  Description: Interventions:  - Assess and re-assess patient's level of risk   - Engage patient in 1:1 interactions, daily, for a minimum of 15 minutes   - Encourage patient to express feelings, fears, frustrations, hopes   Outcome: Progressing  Goal: Refrain from harming self  Description: Interventions:  - Monitor patient closely, per order   - Supervise medication ingestion, monitor effects and side effects   Outcome: Progressing

## 2024-12-14 NOTE — PROGRESS NOTES
"Progress Note - Luba Barajas 89 y.o. female MRN: [id number]    Unit/Bed#: -01 Encounter: [id number]        Subjective:   Patient seen and examined at bedside after reviewing the chart and discussing the case with the caring staff.      Patient examined at bedside.  Patient reports no acute symptoms.    Physical Exam   Vitals: Blood pressure 151/72, pulse 69, temperature 97.5 °F (36.4 °C), temperature source Temporal, resp. rate 17, height 5' 2\" (1.575 m), weight 63.7 kg (140 lb 6.4 oz), SpO2 96%.,Body mass index is 25.68 kg/m².  Constitutional: Patient in no acute distress.  HEENT: PERR, EOMI, MMM.  Cardiovascular: Normal rate and regular rhythm.  Pulmonary/Chest: Effort normal and breath sounds normal.  Abdomen: Soft, + BS, NT.    Assessment/Plan:  Luba Barajas is a(n) 89 y.o. female with MDD.     Cardiac with hx of HTN, HLD.  Continue lisinopril 10 mg daily and pravastatin 40 mg daily (rosuvastatin nonform).  Gait abnormality.  PT OT consulted.  Urinary frequency/incontinence.  Chronic and follows with GYN.  UA without evidence of UTI 11/26.  Tried medications for OAB in the past but stopped due to side effects.   Cutaneous candidiasis.  Apply nystatin powder twice daily.  Dysphagia.  SLP consulted.   Vitamin D/B12 deficiency.  Continue daily supplements.  Constipation.  Patient started on Senokot as 1 tablet daily along with MiraLAX as needed 11/28/24.  Dry nares.  Added saline nasal spray as needed 12/3.  "

## 2024-12-14 NOTE — NURSING NOTE
"Pt was withdrawn to room this evening. Pt repeatedly stating how she \"just wants to sleep\", pt thought it was earlier in the day. Pt kept stating she was scared. Much reassurance provided, pt appeared anxious and trembling. Will CTM. Q15 minute pt safety checks ongoing.   "

## 2024-12-14 NOTE — ASSESSMENT & PLAN NOTE
Continue Zoloft 100 mg daily  Continue Klonopin 0.25 mg 3 times daily for anxiety.    Continue Abilify 2 mg daily to help with depression and anxiety as well as paranoia

## 2024-12-14 NOTE — NURSING NOTE
Pt up ad karey with walker. Pt c/o moderate depression & moderate anxiety. Pt denies any hallucinations, suicidal or homicidal ideations. Q 15 min checks maintained to monitor pt's behavior & safety. Pt is shaky & tearful @ times. Pt socializes minimally with other patients. Pt is cooperative & compliant with medications. Pt is mildly confused @ times.

## 2024-12-15 PROCEDURE — 99232 SBSQ HOSP IP/OBS MODERATE 35: CPT

## 2024-12-15 RX ORDER — CLONAZEPAM 0.5 MG/1
0.25 TABLET ORAL 2 TIMES DAILY
Status: DISCONTINUED | OUTPATIENT
Start: 2024-12-15 | End: 2024-12-18

## 2024-12-15 RX ADMIN — CYANOCOBALAMIN TAB 500 MCG 1000 MCG: 500 TAB at 08:09

## 2024-12-15 RX ADMIN — LISINOPRIL 10 MG: 10 TABLET ORAL at 08:08

## 2024-12-15 RX ADMIN — ARIPIPRAZOLE 2 MG: 2 TABLET ORAL at 08:09

## 2024-12-15 RX ADMIN — Medication 2000 UNITS: at 08:08

## 2024-12-15 RX ADMIN — CLONAZEPAM 0.25 MG: 0.5 TABLET ORAL at 08:08

## 2024-12-15 RX ADMIN — DONEPEZIL HYDROCHLORIDE 5 MG: 5 TABLET ORAL at 21:39

## 2024-12-15 RX ADMIN — NYSTATIN 1 APPLICATION: 100000 POWDER TOPICAL at 08:09

## 2024-12-15 RX ADMIN — ANORECTAL OINTMENT 1 APPLICATION: 15.7; .44; 24; 20.6 OINTMENT TOPICAL at 08:10

## 2024-12-15 RX ADMIN — NYSTATIN: 100000 POWDER TOPICAL at 17:18

## 2024-12-15 RX ADMIN — CLONAZEPAM 0.5 MG: 0.5 TABLET ORAL at 21:39

## 2024-12-15 RX ADMIN — SERTRALINE 100 MG: 100 TABLET, FILM COATED ORAL at 08:09

## 2024-12-15 RX ADMIN — SENNOSIDES AND DOCUSATE SODIUM 1 TABLET: 8.6; 5 TABLET ORAL at 21:39

## 2024-12-15 RX ADMIN — CLONAZEPAM 0.25 MG: 0.5 TABLET ORAL at 17:17

## 2024-12-15 RX ADMIN — PRAVASTATIN SODIUM 40 MG: 40 TABLET ORAL at 17:17

## 2024-12-15 NOTE — NURSING NOTE
Pt was mostly withdrawn this evening. Pt had moderate anxiety, seemed less confused this evening. Pt was pleasant, cooperative, and med compliant. Will CTM. Q15 minute pt safety checks ongoing.

## 2024-12-15 NOTE — NURSING NOTE
Patient presents with high anxiety today. She is tearful at times and needs frequent reassurance. She is out for meals. Appetite is fair. No complaints of pain. Compliant with medications.

## 2024-12-15 NOTE — PROGRESS NOTES
Progress Note - Behavioral Health   Name: Luba Mendoza y.o. female I MRN: [id number]  Unit/Bed#: [facility]01 I Date of Admission: 11/26/2024   Date of Service: 12/15/2024 I Hospital Day: 19     Assessment & Plan  Generalized anxiety disorder  Continue Zoloft 100 mg daily  Decrease Klonopin 0.25 mg 2 times daily for anxiety to assess if medication contributing to mild confusion    Continue Abilify 2 mg daily to help with depression and anxiety as well as paranoia  MDD (major depressive disorder), recurrent episode, severe (HCC)  Continue Zoloft 100 mg daily  Decrease Klonopin 0.25 mg 2 times daily for anxiety  HTN (hypertension)  Per medical  Mild neurocognitive disorder  Supportive care      Progress Note - Behavioral Health   Luba Mendoza y.o. female MRN: [id number]  Unit/Bed#: [facility]01 Encounter: [id number]    Assessment & Plan   Principal Problem:    MDD (major depressive disorder), recurrent episode, severe (HCC)  Active Problems:    Generalized anxiety disorder    HTN (hypertension)    Mild neurocognitive disorder      Subjective:Patient was seen today for continuation of care, records reviewed and  patient was discussed with the morning case review team.  No significant behaviors over the past 24 hours.  Upon assessment, patient appears with mild confusion, states at times she forgets she was in a hospital and does not understand why. She is oriented x3 during today's discussion and appears in no distress.  Primary treatment team was considering decreasing Klonopin in the context of mild confusion, we will decrease Klonopin to 0.25 mg twice daily today, patient agreed to plan.  Patient continues to be highly anxious, tremors at times due to anxiety.  She continues to report decreasing depression since hospital stay, continues to require much reassurance. Patient denies endorsing any suicidal or homicidal ideation. Remains medication compliance. Denies any side effects from  medications.    Psychiatric Review of Systems:    Sleep: normal  Appetite: fair  Medication side effects: No   ROS: reports mild confusion    Vitals:  Vitals:    12/15/24 0725   BP: 119/66   Pulse: 83   Resp: 16   Temp: (!) 97.1 °F (36.2 °C)   SpO2: 94%       Mental Status Evaluation:    Appearance:  casually dressed, dressed appropriately   Behavior:  pleasant, restless   Speech:  normal rate and volume   Mood:  anxious   Affect:  constricted   Thought Process:  concrete   Associations: concrete associations   Thought Content:  no overt delusions   Perceptual Disturbances: denies auditory hallucinations when asked, does not appear responding to internal stimuli   Risk Potential: Suicidal ideation - None at present  Homicidal ideation - None  Potential for aggression - No   Sensorium:  oriented to person, place, and time/date   Memory:  recent and remote memory grossly intact   Consciousness:  alert and awake   Attention: attention span and concentration appear shorter than expected for age   Insight:  limited   Judgment: limited   Gait/Station: uses walker   Motor Activity: no abnormal movements     Laboratory results:  I have personally reviewed all pertinent laboratory/tests results.  Most Recent Labs:   Lab Results   Component Value Date    WBC 5.00 11/28/2024    RBC 4.16 11/28/2024    HGB 12.0 11/28/2024    HCT 37.6 11/28/2024     11/28/2024    RDW 14.3 11/28/2024    NEUTROABS 3.70 11/28/2024    SODIUM 140 12/05/2024    K 3.9 12/05/2024     12/05/2024    CO2 26 12/05/2024    BUN 18 12/05/2024    CREATININE 0.78 12/05/2024    GLUC 115 12/05/2024    GLUF 115 (H) 12/05/2024    CALCIUM 9.7 12/05/2024    AST 12 (L) 11/28/2024    ALT 8 11/28/2024    ALKPHOS 59 11/28/2024    TP 6.0 (L) 11/28/2024    ALB 3.7 11/28/2024    TBILI 0.74 11/28/2024    CHOLESTEROL 135 11/28/2024    HDL 58 11/28/2024    TRIG 72 11/28/2024    LDLCALC 63 11/28/2024    NONHDLC 77 11/28/2024    [id number] 0.934 11/26/2024    RPR  Non-Reactive 03/11/2016         Recommended Treatment:     All current active medications have been reviewed  Encourage group therapy, milieu therapy and occupational therapy  Behavioral Health checks for safety monitoring  Continue treatment with group therapy, milieu therapy and occupational therapy  Continue current medications:  Current Facility-Administered Medications   Medication Dose Route Frequency Provider Last Rate    acetaminophen  650 mg Oral Q4H PRN Myrna Caldwell MD      acetaminophen  650 mg Oral Q4H PRN Myrna Caldwell MD      acetaminophen  975 mg Oral Q6H PRN Myrna Caldwell MD      aluminum-magnesium hydroxide-simethicone  30 mL Oral Q4H PRN Myrna Caldwell MD      ARIPiprazole  2 mg Oral Daily Sesar A Prayson, DO      Cholecalciferol  2,000 Units Oral Daily AGUILAR DouglasC      clonazePAM  0.25 mg Oral BID VAL Barr      clonazePAM  0.5 mg Oral BID PRN Sesar A Prayson, DO      cyanocobalamin  1,000 mcg Oral Daily MAN Douglas-C      donepezil  5 mg Oral HS VAL Muñiz      haloperidol lactate  5 mg Intramuscular Q4H PRN Max 4/day Myrna Caldwell MD      hydrOXYzine HCL  25 mg Oral Q6H PRN Max 4/day Myrna Caldwell MD      hydrOXYzine HCL  50 mg Oral Q6H PRN Max 4/day Myrna Caldwell MD      lisinopril  10 mg Oral Daily Clare Brennan, PA-C      menthol-zinc oxide   Topical BID Clare Brennan, PA-C      nicotine polacrilex  4 mg Oral Q2H PRN Myrna Caldwell MD      nystatin   Topical BID Clare L Anu, PA-C      polyethylene glycol  17 g Oral Daily PRN Royce Bailon MD      pravastatin  40 mg Oral Daily With Dinner AGUILAR DouglasC      propranolol  5 mg Oral Q8H PRN Myrna Caldwell MD      risperiDONE  0.25 mg Oral Q4H PRN Max 6/day Myrna Caldwell MD      risperiDONE  0.5 mg Oral Q4H PRN Max 3/day Myrna Caldwell MD      risperiDONE  1 mg Oral Q2H PRN Max 3/day Myrna Caldwell MD      senna-docusate sodium   1 tablet Oral  Royce Bailon MD      sertraline  100 mg Oral Daily Renard Keyes MD      sodium chloride  2 spray Each Nare Q1H PRN Clare Brennan PA-C      traZODone  50 mg Oral Q6H PRN Max 3/day Myrna Caldwell MD         Risks / Benefits of Treatment:     Risks, benefits, and possible side effects of medications explained to patient. Patient has limited understanding of risks and benefits of treatment at this time, but agrees to take medications as prescribed.    Counseling / Coordination of Care:     Patient's progress reviewed with nursing staff.  Medications, treatment progress and treatment plan reviewed with patient.  Supportive counseling provided to the patient.          VAL Goetz

## 2024-12-15 NOTE — NURSING NOTE
Patient appears to have slept thru the night, No acute behaviors observed or concerns voiced. Will CTM. Q15 minute pt safety checks in progress.

## 2024-12-15 NOTE — NURSING NOTE
B - Abdomen is soft and non-distended. Bowel sounds positive x4. Patient had a BM today.  L - Lungs clear to auscultation. Denies SOB or chest pain.  E - No edema observed.  P - CRT <3 seconds. PPP.  S - Skin intact. No redness observed under breasts or in groin.

## 2024-12-15 NOTE — PROGRESS NOTES
"Progress Note - Luba Barajas 89 y.o. female MRN: [id number]    Unit/Bed#: -01 Encounter: [id number]        Subjective:   Patient seen and examined at bedside after reviewing the chart and discussing the case with the caring staff.      Patient examined at bedside.  Patient reports no acute symptoms.    Physical Exam   Vitals: Blood pressure 119/66, pulse 83, temperature (!) 97.1 °F (36.2 °C), temperature source Temporal, resp. rate 16, height 5' 2\" (1.575 m), weight 63.7 kg (140 lb 6.4 oz), SpO2 94%.,Body mass index is 25.68 kg/m².  Constitutional: Patient in no acute distress.  HEENT: PERR, EOMI, MMM.  Cardiovascular: Normal rate and regular rhythm.  Pulmonary/Chest: Effort normal and breath sounds normal.  Abdomen: Soft, + BS, NT.    Assessment/Plan:  Luba Barajas is a(n) 89 y.o. female with MDD.     Cardiac with hx of HTN, HLD.  Continue lisinopril 10 mg daily and pravastatin 40 mg daily (rosuvastatin nonform).  Gait abnormality.  PT OT consulted.  Urinary frequency/incontinence.  Chronic and follows with GYN.  UA without evidence of UTI 11/26.  Tried medications for OAB in the past but stopped due to side effects.   Cutaneous candidiasis.  Apply nystatin powder twice daily.  Dysphagia.  SLP consulted.   Vitamin D/B12 deficiency.  Continue daily supplements.  Constipation.  Patient started on Senokot as 1 tablet daily along with MiraLAX as needed 11/28/24.  Dry nares.  Added saline nasal spray as needed 12/3.  "

## 2024-12-15 NOTE — ASSESSMENT & PLAN NOTE
Continue Zoloft 100 mg daily  Decrease Klonopin 0.25 mg 2 times daily for anxiety to assess if medication contributing to mild confusion    Continue Abilify 2 mg daily to help with depression and anxiety as well as paranoia

## 2024-12-16 PROCEDURE — 99232 SBSQ HOSP IP/OBS MODERATE 35: CPT | Performed by: PSYCHIATRY & NEUROLOGY

## 2024-12-16 RX ORDER — ARIPIPRAZOLE 5 MG/1
5 TABLET ORAL DAILY
Status: DISCONTINUED | OUTPATIENT
Start: 2024-12-17 | End: 2024-12-23 | Stop reason: HOSPADM

## 2024-12-16 RX ADMIN — SENNOSIDES AND DOCUSATE SODIUM 1 TABLET: 8.6; 5 TABLET ORAL at 21:08

## 2024-12-16 RX ADMIN — ARIPIPRAZOLE 2 MG: 2 TABLET ORAL at 08:40

## 2024-12-16 RX ADMIN — CLONAZEPAM 0.25 MG: 0.5 TABLET ORAL at 17:20

## 2024-12-16 RX ADMIN — DONEPEZIL HYDROCHLORIDE 5 MG: 5 TABLET ORAL at 21:08

## 2024-12-16 RX ADMIN — SERTRALINE 100 MG: 100 TABLET, FILM COATED ORAL at 08:40

## 2024-12-16 RX ADMIN — LISINOPRIL 10 MG: 10 TABLET ORAL at 08:40

## 2024-12-16 RX ADMIN — Medication 2000 UNITS: at 08:40

## 2024-12-16 RX ADMIN — CYANOCOBALAMIN TAB 500 MCG 1000 MCG: 500 TAB at 08:40

## 2024-12-16 RX ADMIN — CLONAZEPAM 0.25 MG: 0.5 TABLET ORAL at 08:39

## 2024-12-16 RX ADMIN — PRAVASTATIN SODIUM 40 MG: 40 TABLET ORAL at 17:20

## 2024-12-16 NOTE — PROGRESS NOTES
"Progress Note - Behavioral Health   Name: Luba Barajas 89 y.o. female I MRN: [id number]  Unit/Bed#: [facility]01 I Date of Admission: 11/26/2024   Date of Service: 12/16/2024 I Hospital Day: 20    Assessment & Plan  Generalized anxiety disorder  Continue Zoloft 100 mg daily   Klonopin 0.25 mg 2 times daily for anxiety to assess if medication contributing to mild confusion    Increase Abilify to 5 mg daily to help with depression and anxiety as well as paranoia  MDD (major depressive disorder), recurrent episode, severe (HCC)  Continue Zoloft 100 mg daily  Klonopin 0.25 mg 2 times daily for anxiety  HTN (hypertension)  Per medical  Mild neurocognitive disorder  Supportive care    Progress Toward Goals: Unchanged    Recommended Treatment: Continue with group therapy, milieu therapy and occupational therapy.      Risks, benefits and possible side effects of Medications:   Risks, benefits, and possible side effects of medications explained to patient and patient verbalizes understanding.      History of Present Illness   Behavior over the last 24 hours:  unchanged  Sleep: Reports disrupted sleep and startles easily with noises.  Appetite: Decreased  Medication side effects: No  ROS: no complaints    Subjective: Luba seen today for psychiatric follow-up.  Patient reporting increased anxiety symptoms today.  \"I am worrying about things I should not worry about.\"  Patient fidgety and restless however remained pleasant and cooperative throughout our conversation.  Patient reporting had some difficulty sleeping and states startles easily with noises.  Reporting decreased appetite.  Denies SI HI.  Last MoCA score of 12.  Verbalizing paranoia and  ruminating about her sons and not having anywhere to go when discharged.    Objective   Mental Status Evaluation:  Appearance:  age appropriate, casually dressed, and adequate hygiene   Behavior:  restless and fidgety and anxious   Speech:  normal pitch and normal volume   Mood:  " anxious   Affect:  constricted and labile   Thought Process:  goal directed   Associations: concrete associations   Thought Content:  Paranoia and ruminating thoughts   Perceptual Disturbances: Denies hallucinations when asked   Risk Potential: Suicidal Ideations none  Homicidal Ideations none  Potential for Aggression No   Sensorium:  person and place   Memory:  short term memory mildly impaired   Consciousness:  alert and awake    Attention: attention span appeared shorter than expected for age   Insight:  limited   Judgment: limited   Gait/Station: Uses walker   Motor Activity: no abnormal movements     Medications: all current active meds have been reviewed and planned medication changes: Increase Abilify to 5 mg daily .      Lab Results: I have reviewed the following results:  Most Recent Labs:   Lab Results   Component Value Date    WBC 5.00 11/28/2024    RBC 4.16 11/28/2024    HGB 12.0 11/28/2024    HCT 37.6 11/28/2024     11/28/2024    RDW 14.3 11/28/2024    NEUTROABS 3.70 11/28/2024    SODIUM 140 12/05/2024    K 3.9 12/05/2024     12/05/2024    CO2 26 12/05/2024    BUN 18 12/05/2024    CREATININE 0.78 12/05/2024    GLUC 115 12/05/2024    GLUF 115 (H) 12/05/2024    CALCIUM 9.7 12/05/2024    AST 12 (L) 11/28/2024    ALT 8 11/28/2024    ALKPHOS 59 11/28/2024    TP 6.0 (L) 11/28/2024    ALB 3.7 11/28/2024    TBILI 0.74 11/28/2024    CHOLESTEROL 135 11/28/2024    HDL 58 11/28/2024    TRIG 72 11/28/2024    LDLCALC 63 11/28/2024    NONHDLC 77 11/28/2024    [id number] 0.934 11/26/2024    RPR Non-Reactive 03/11/2016

## 2024-12-16 NOTE — PROGRESS NOTES
"Progress Note - Luba Barajas 89 y.o. female MRN: [id number]    Unit/Bed#: -01 Encounter: [id number]        Subjective:   Patient seen and examined at bedside after reviewing the chart and discussing the case with the caring staff.      Patient examined at bedside.  Patient feel anxious this morning and does not want to do anything.    Physical Exam   Vitals: Blood pressure 163/99, pulse 86, temperature 97.6 °F (36.4 °C), temperature source Temporal, resp. rate 16, height 5' 2\" (1.575 m), weight 63.7 kg (140 lb 6.4 oz), SpO2 95%.,Body mass index is 25.68 kg/m².  Constitutional: Patient in no acute distress.  HEENT: PERR, EOMI, MMM.  Cardiovascular: Normal rate and regular rhythm.  Pulmonary/Chest: Effort normal and breath sounds normal.  Abdomen: Soft, + BS, NT.    Assessment/Plan:  Luba Barajas is a(n) 89 y.o. female with MDD.     Cardiac with hx of HTN, HLD.  Continue lisinopril 10 mg daily and pravastatin 40 mg daily (rosuvastatin nonform).  Gait abnormality.  PT OT consulted.  Urinary frequency/incontinence.  Chronic and follows with GYN.  UA without evidence of UTI 11/26.  Tried medications for OAB in the past but stopped due to side effects.   Cutaneous candidiasis.  Apply nystatin powder twice daily.  Dysphagia.  SLP consulted.   Vitamin D/B12 deficiency.  Continue daily supplements.  Constipation.  Patient started on Senokot as 1 tablet daily along with MiraLAX as needed 11/28/24.  Dry nares.  Added saline nasal spray as needed 12/3.  "

## 2024-12-16 NOTE — NURSING NOTE
Pt was withdrawn to their room this evening. Pt continues to worry and have high anxiety. Pt had phone call w/ son and became very anxious, administered 0.5mg klonopin HAM 23 @ 2139, upon reassessment @ 2235, klonopin seemingly effective, pt appears to be asleep. Will CTM. Q15 minute pt safety checks ongoing.

## 2024-12-16 NOTE — PROGRESS NOTES
12/16/24    Team Meeting   Meeting Type Daily Rounds   Team Members Present   Team Members Present Physician;Nurse;Occupational Therapist;   Physician Team Member Stephy CONWAY   Nursing Team Member Shar LEÓN   Social Work Team Member Benjie URIOSTEGUI   OT Team Member Levar ALONZOA/GERSON   Patient/Family Present   Patient Present No   Patient's Family Present No   201, panic attacks, tremors, observed sleeping, somatic, no d/c date med adjustments, appears calm when observed, 14 12 moca

## 2024-12-16 NOTE — ASSESSMENT & PLAN NOTE
Continue Zoloft 100 mg daily   Klonopin 0.25 mg 2 times daily for anxiety to assess if medication contributing to mild confusion    Increase Abilify to 5 mg daily to help with depression and anxiety as well as paranoia

## 2024-12-16 NOTE — NURSING NOTE
"Patient woke up this morning to take her medications and was very shaky, trembling and scared. Patients anxiety symptoms appear worse when you talk to her. Patient remains calm if by herself with no interaction. Nurse talked to patient for a while and calmed her down. Patient initially refused to eat breakfast but nurse encouraged her and she did come out to eat. Patient needs a lot of encouragement and redirection as she states multiple times \"what should I do?\" Patient encouraged to attend groups during the day and she has today. No other acute behaviors noted. Continuous safety monitoring in place.   "

## 2024-12-17 PROCEDURE — 99232 SBSQ HOSP IP/OBS MODERATE 35: CPT | Performed by: PSYCHIATRY & NEUROLOGY

## 2024-12-17 RX ADMIN — Medication 2000 UNITS: at 08:12

## 2024-12-17 RX ADMIN — DONEPEZIL HYDROCHLORIDE 5 MG: 5 TABLET ORAL at 21:06

## 2024-12-17 RX ADMIN — SERTRALINE 100 MG: 100 TABLET, FILM COATED ORAL at 08:12

## 2024-12-17 RX ADMIN — LISINOPRIL 10 MG: 10 TABLET ORAL at 08:12

## 2024-12-17 RX ADMIN — CYANOCOBALAMIN TAB 500 MCG 1000 MCG: 500 TAB at 08:12

## 2024-12-17 RX ADMIN — SENNOSIDES AND DOCUSATE SODIUM 1 TABLET: 8.6; 5 TABLET ORAL at 21:06

## 2024-12-17 RX ADMIN — CLONAZEPAM 0.25 MG: 0.5 TABLET ORAL at 17:09

## 2024-12-17 RX ADMIN — CLONAZEPAM 0.25 MG: 0.5 TABLET ORAL at 08:12

## 2024-12-17 RX ADMIN — PRAVASTATIN SODIUM 40 MG: 40 TABLET ORAL at 17:10

## 2024-12-17 RX ADMIN — ARIPIPRAZOLE 5 MG: 5 TABLET ORAL at 08:17

## 2024-12-17 NOTE — NURSING NOTE
Patient endorses moderate anxiety and depression, denies SI, HI, AVH. She is pleasant and med compliant. She did not participate in a snack this evening. Continuous 15 min safety checks in progress.

## 2024-12-17 NOTE — PROGRESS NOTES
Progress Note - Behavioral Health   Name: Luba Barajas 89 y.o. female I MRN: [id number]   Unit/Bed#: [facility] 204-01 I Date of Admission: 11/26/2024   Date of Service: 12/17/2024 I Hospital Day: 21         Assessment & Plan  Generalized anxiety disorder  Continue Zoloft 100 mg daily  Klonopin 0.25 mg 2 times daily for high anxiety.  Abilify to 5 mg daily to help with depression and anxiety.  MDD (major depressive disorder), recurrent episode, severe (HCC)  Continue Zoloft 100 mg daily    HTN (hypertension)  Per medical  Mild neurocognitive disorder  Supportive care      Recommended Treatment:     Planned medication and treatment changes:    All current medication has been reviewed.  Encourage group therapy, milieu therapy and occupational therapy.   Behavioral health checks for safety monitoring.  Family meeting to discuss pt's discharge plan tomorrow if pt continues improving. Monitor behavior and fall risk.    Current medications:  Current Facility-Administered Medications   Medication Dose Route Frequency Provider Last Rate    acetaminophen  650 mg Oral Q4H PRN Myrna Caldwell MD      acetaminophen  650 mg Oral Q4H PRN Myrna Caldwell MD      acetaminophen  975 mg Oral Q6H PRN Myrna Caldwell MD      aluminum-magnesium hydroxide-simethicone  30 mL Oral Q4H PRN Myrna Caldwell MD      ARIPiprazole  5 mg Oral Daily Griffin Nunez PA-C      Cholecalciferol  2,000 Units Oral Daily Clare Brennan PA-C      clonazePAM  0.25 mg Oral BID VAL Barr      clonazePAM  0.5 mg Oral BID PRN Sesar Phillips DO      cyanocobalamin  1,000 mcg Oral Daily Clare Brennan PA-C      donepezil  5 mg Oral HS VAL Muñiz      haloperidol lactate  5 mg Intramuscular Q4H PRN Max 4/day Myrna Caldwell MD      hydrOXYzine HCL  25 mg Oral Q6H PRN Max 4/day Myrna Caldwell MD      hydrOXYzine HCL  50 mg Oral Q6H PRN Max 4/day Myrna Caldwell MD      lisinopril  10 mg Oral Daily Clare BABB  FABIAN Brennan      menthol-zinc oxide   Topical BID Clare SamuelMAN dorman-DAPHNE      nicotine polacrilex  4 mg Oral Q2H PRN Myrna Caldwell MD      nystatin   Topical BID Clare SamuelMAN dorman-C      polyethylene glycol  17 g Oral Daily PRN Royce Bailon MD      pravastatin  40 mg Oral Daily With Dinner Clare DORMAN FABIAN Brennan      propranolol  5 mg Oral Q8H PRN Myrna Caldwell MD      risperiDONE  0.25 mg Oral Q4H PRN Max 6/day Myrna Caldwell MD      risperiDONE  0.5 mg Oral Q4H PRN Max 3/day Myrna Caldwell MD      risperiDONE  1 mg Oral Q2H PRN Max 3/day Myrna Caldwell MD      senna-docusate sodium  1 tablet Oral HS Royce Baioln MD      sertraline  100 mg Oral Daily Renard Keyes MD      sodium chloride  2 spray Each Nare Q1H PRN Clare SamuelFABIAN dorman      traZODone  50 mg Oral Q6H PRN Max 3/day Myrna Caldwell MD         Risks / Benefits of Treatment:    Risks, benefits, and possible side effects of medications explained to patient and patient verbalizes understanding and agreement for treatment.    Subjective:    Behavior over the last 24 hours: some improvement.     Luba was seen for continuing care. She reports feeling less anxious and restless this morning and has been interacting with selective peers appropriately. She ruminates constantly but states she is making some progress and is slightly more open to discuss about returning to her AL. Denies SI or wish to die, hallucinations presently. She is alert and oriented X 2  but has very limited insight into her cognitive function decline. She has been compliant with medication and denies any current side effects. Staff reports some participation in groups and milieu.    Sleep: improved  Appetite: fair  Medication side effects: denies   ROS: all other systems are negative except chronic back pain    Mental Status Evaluation:    Appearance:  age appropriate, casually dressed   Behavior:  responds to redirection   Speech:  normal rate and volume    Mood:  anxious mild   Affect:  mood-congruent   Thought Process:  negative thinking   Associations: intact associations   Thought Content:  no overt delusions, ruminations   Perceptual Disturbances: denies auditory hallucinations when asked   Risk Potential: Suicidal ideation - None at present  Homicidal ideation - None at present  Potential for aggression - Not at present   Sensorium:  oriented to person, place, and time/date   Memory:  recent and remote memory grossly intact   Consciousness:  alert and awake   Attention/Concentration: attention span and concentration are age appropriate   Insight:  limited   Judgment: fair   Gait/Station: uses walker   Motor Activity: no abnormal movements     Vital signs in last 24 hours:    Temp:  [97.4 °F (36.3 °C)-97.6 °F (36.4 °C)] 97.5 °F (36.4 °C)  HR:  [58-96] 58  BP: (119-163)/(58-99) 119/58  Resp:  [16-18] 18  SpO2:  [92 %-95 %] 95 %  O2 Device: None (Room air)         Laboratory results: I have personally reviewed all pertinent laboratory/tests results    Most Recent Labs:   Lab Results   Component Value Date    WBC 5.00 11/28/2024    RBC 4.16 11/28/2024    HGB 12.0 11/28/2024    HCT 37.6 11/28/2024     11/28/2024    RDW 14.3 11/28/2024    NEUTROABS 3.70 11/28/2024    SODIUM 140 12/05/2024    K 3.9 12/05/2024     12/05/2024    CO2 26 12/05/2024    BUN 18 12/05/2024    CREATININE 0.78 12/05/2024    GLUC 115 12/05/2024    CALCIUM 9.7 12/05/2024    AST 12 (L) 11/28/2024    ALT 8 11/28/2024    ALKPHOS 59 11/28/2024    TP 6.0 (L) 11/28/2024    ALB 3.7 11/28/2024    TBILI 0.74 11/28/2024    CHOLESTEROL 135 11/28/2024    HDL 58 11/28/2024    TRIG 72 11/28/2024    LDLCALC 63 11/28/2024    NONHDLC 77 11/28/2024    FXV3MDTEVNDN 0.934 11/26/2024    SYPHILISAB Non-reactive 11/28/2024       Counseling / Coordination of Care:    Patient's progress discussed with staff in treatment team meeting.  Medication changes reviewed with nursing staff.  Supportive therapy  provided to patient.  Group attendance encouraged.    Renard Keyes MD 12/17/24

## 2024-12-17 NOTE — PROGRESS NOTES
"Progress Note - Luba Barajas 89 y.o. female MRN: [id number]    Unit/Bed#: -01 Encounter: [id number]        Subjective:   Patient seen and examined at bedside after reviewing the chart and discussing the case with the caring staff.      Patient examined at bedside.  Patient states she feels tired this morning but otherwise denies any acute symptoms.     Physical Exam   Vitals: Blood pressure 137/80, pulse 94, temperature (!) 97.4 °F (36.3 °C), temperature source Temporal, resp. rate 18, height 5' 2\" (1.575 m), weight 63.7 kg (140 lb 6.4 oz), SpO2 95%.,Body mass index is 25.68 kg/m².  Constitutional: Patient in no acute distress.  HEENT: PERR, EOMI, MMM.  Cardiovascular: Normal rate and regular rhythm.  Pulmonary/Chest: Effort normal and breath sounds normal.  Abdomen: Soft, + BS, NT.    Assessment/Plan:  Luba Barajas is a(n) 89 y.o. female with MDD.     Cardiac with hx of HTN, HLD.  Continue lisinopril 10 mg daily and pravastatin 40 mg daily (rosuvastatin nonform).  Gait abnormality.  PT OT consulted.  Urinary frequency/incontinence.  Chronic and follows with GYN.  UA without evidence of UTI 11/26.  Tried medications for OAB in the past but stopped due to side effects.   Cutaneous candidiasis.  Apply nystatin powder twice daily.  Dysphagia.  SLP consulted.   Vitamin D/B12 deficiency.  Continue daily supplements.  Constipation.  Patient started on Senokot as 1 tablet daily along with MiraLAX as needed 11/28/24.  Dry nares.  Added saline nasal spray as needed 12/3.    The patient was discussed with Dr. Bailon and he is in agreement with the above note.  "

## 2024-12-17 NOTE — PROGRESS NOTES
12/17/24    Team Meeting   Meeting Type Daily Rounds   Team Members Present   Team Members Present Physician;Nurse;;Occupational Therapist   Physician Team Member Stephy NEAL   Nursing Team Member Carolyn LEÓN   Social Work Team Member Benjie URIOSTEGUI   OT Team Member Levar NIETO   Patient/Family Present   Patient Present No   Patient's Family Present No   201, less tremors, stable, mod anx/dep, slight improvements, coming out of room, appears less anx, return to the [facility] Fri

## 2024-12-17 NOTE — PLAN OF CARE
Problem: Ineffective Coping  Goal: Participates in unit activities  Description: Interventions:  - Provide therapeutic environment   - Provide required programming   - Redirect inappropriate behaviors   Outcome: Progressing     Problem: Depression  Goal: Refrain from harming self  Description: Interventions:  - Monitor patient closely, per order   - Supervise medication ingestion, monitor effects and side effects   Outcome: Progressing

## 2024-12-17 NOTE — NURSING NOTE
Patient appears to have slept for the majority of the overnight hours. No concerns voiced, no signs of distress. Continuous 15 min safety checks in progress.

## 2024-12-17 NOTE — NURSING NOTE
Patient has been cooperative and pleasant on the unit. Patient endorsed moderate anxiety and depression. Patient has been brighter today on he unit and has not had any physical anxiety symptoms. Patient has attended groups and has come out to the dining room on her own without prompting. Patient is able to make needs known. Continuous safety monitoring in place.

## 2024-12-17 NOTE — ASSESSMENT & PLAN NOTE
Continue Zoloft 100 mg daily  Klonopin 0.25 mg 2 times daily for high anxiety.  Abilify to 5 mg daily to help with depression and anxiety.

## 2024-12-18 PROCEDURE — 99232 SBSQ HOSP IP/OBS MODERATE 35: CPT | Performed by: PSYCHIATRY & NEUROLOGY

## 2024-12-18 RX ORDER — CLONAZEPAM 0.5 MG/1
0.25 TABLET ORAL 3 TIMES DAILY
Status: DISCONTINUED | OUTPATIENT
Start: 2024-12-18 | End: 2024-12-23 | Stop reason: HOSPADM

## 2024-12-18 RX ADMIN — SERTRALINE 100 MG: 100 TABLET, FILM COATED ORAL at 08:40

## 2024-12-18 RX ADMIN — CYANOCOBALAMIN TAB 500 MCG 1000 MCG: 500 TAB at 08:40

## 2024-12-18 RX ADMIN — CLONAZEPAM 0.25 MG: 0.5 TABLET ORAL at 17:20

## 2024-12-18 RX ADMIN — DONEPEZIL HYDROCHLORIDE 5 MG: 5 TABLET ORAL at 21:08

## 2024-12-18 RX ADMIN — ARIPIPRAZOLE 5 MG: 5 TABLET ORAL at 08:40

## 2024-12-18 RX ADMIN — LISINOPRIL 10 MG: 10 TABLET ORAL at 08:40

## 2024-12-18 RX ADMIN — Medication 2000 UNITS: at 08:40

## 2024-12-18 RX ADMIN — NYSTATIN: 100000 POWDER TOPICAL at 08:42

## 2024-12-18 RX ADMIN — CLONAZEPAM 0.25 MG: 0.5 TABLET ORAL at 21:08

## 2024-12-18 RX ADMIN — CLONAZEPAM 0.25 MG: 0.5 TABLET ORAL at 08:41

## 2024-12-18 RX ADMIN — NYSTATIN 1 APPLICATION: 100000 POWDER TOPICAL at 21:47

## 2024-12-18 RX ADMIN — PRAVASTATIN SODIUM 40 MG: 40 TABLET ORAL at 17:19

## 2024-12-18 NOTE — PLAN OF CARE
Problem: Ineffective Coping  Goal: Participates in unit activities  Description: Interventions:  - Provide therapeutic environment   - Provide required programming   - Redirect inappropriate behaviors   Outcome: Progressing     Problem: Depression  Goal: Attend and participate in unit activities, including therapeutic, recreational, and educational groups  Description: Interventions:  - Provide therapeutic and educational activities daily, encourage attendance and participation, and document same in the medical record   Outcome: Progressing   Patient out for groups daily and will engage when encouraged.

## 2024-12-18 NOTE — PROGRESS NOTES
"Progress Note - Behavioral Health   Name: Luba Barajas 89 y.o. female I MRN: [id number]  Unit/Bed#: [facility] 204-01 I Date of Admission: 11/26/2024   Date of Service: 12/18/2024 I Hospital Day: 22     Assessment & Plan  Generalized anxiety disorder  Continue Zoloft 100 mg daily  Increase Klonopin to 0.25mg PO TID for management of anxiety/restlessness.  Continue Abilify to 5 mg daily to help with depression and anxiety.  MDD (major depressive disorder), recurrent episode, severe (HCC)  Continue Zoloft 100 mg daily    HTN (hypertension)  Per medical  Mild neurocognitive disorder  Supportive care    Planned medication and treatment changes:    All current active medications have been reviewed  Encourage group therapy, milieu therapy and occupational therapy  Behavioral Health checks for safety monitoring  Increase Klonopin to TID dosing, continue all other medications.   Continue to monitor for side effects.  Possible discharge Friday if continues to improve.    Behavior over the last 24 hours: unchanged.     Luba is seen today for psychiatric follow up. Per nursing notes, patient is pleasant and cooperative, endorses moderate anxiety and depression. She appeared brighter yesterday and attended groups. Slept without issue last night. Anxious/shaky at times, requires frequent reassurance.  Patient remains in behavioral control.     Today Luba endorses anxiety about if she is doing well and if she will continue to do well outside of the hospital. Requires frequent reassurance, some pessimistic thoughts but patient was encouraged to focus on the positive. She reports she would be looking forward to returning back to home and that she can do more things at home, as well as has not seen her sons in a long time which she would like to see them. Perseverative, questioning if she is doing well enough to discharge, patient appears brighter today and admits she is \"doing good\" today. Denies SI/HI/AVH. Denies hallucinations when " asked and does not appear to be responding to internal stimuli. Mildly anxious appearing.  Discussed the risks/side effects as well as warnings of benzo usage with patient, she verbalizes understanding and agrees with plan. She denies current medication side effects and denies further questions/concerns when asked.     Sleep:  Adequate  Appetite:  adequate  Medication side effects: No   ROS: no complaints    Mental Status Evaluation:    Appearance:  age appropriate, casually dressed, adequate grooming   Behavior:  pleasant, cooperative, calm, mildly anxious appearing   Speech:  normal rate and volume, clear   Mood:  anxious   Affect:  slightly brighter, mood-congruent   Thought Process:  linear, negative thinking   Associations: intact associations, perseverative   Thought Content:  negative thinking, ruminating thoughts   Perceptual Disturbances: denies auditory hallucinations when asked, does not appear responding to internal stimuli, denies visual hallucinations when asked   Risk Potential: Suicidal ideation - None, contracts for safety on the unit, would talk to staff if not feeling safe on the unit  Homicidal ideation - None  Potential for aggression - No   Sensorium:  oriented to person and place   Memory:  recent and remote memory grossly intact   Consciousness:  alert and awake   Attention/Concentration: attention span and concentration are age appropriate   Insight:  limited   Judgment: fair   Gait/Station: slow gait, uses walker   Motor Activity: no abnormal movements     Vital signs in last 24 hours:    Temp:  [97.5 °F (36.4 °C)] 97.5 °F (36.4 °C)  HR:  [67-75] 74  BP: (124-143)/(61-68) 143/68  Resp:  [16-18] 16  SpO2:  [94 %-97 %] 97 %  O2 Device: None (Room air)    Laboratory results: I have personally reviewed all pertinent laboratory/tests results    Results from the past 24 hours: No results found for this or any previous visit (from the past 24 hours).    Progress Toward Goals: progressing, appears  slightly brighter, endorses anxiety with mild shaking when very anxious, agreeable to TID dosing of Klonopin, requires frequent reassurance, denies SI/HI/AVH. Eating and sleeping adequately. Attends groups and appears brighter today.    Assessment & Plan   Principal Problem:    MDD (major depressive disorder), recurrent episode, severe (HCC)  Active Problems:    Generalized anxiety disorder    HTN (hypertension)    Mild neurocognitive disorder        Current Facility-Administered Medications   Medication Dose Route Frequency Provider Last Rate    acetaminophen  650 mg Oral Q4H PRN Myrna Caldwell MD      acetaminophen  650 mg Oral Q4H PRN Myrna Caldwell MD      acetaminophen  975 mg Oral Q6H PRN Myrna Caldwell MD      aluminum-magnesium hydroxide-simethicone  30 mL Oral Q4H PRN Myrna Caldwell MD      ARIPiprazole  5 mg Oral Daily AGUILAR NevilleC      Cholecalciferol  2,000 Units Oral Daily Clare Brennan, PA-C      clonazePAM  0.25 mg Oral TID Renard Keyes MD      clonazePAM  0.5 mg Oral BID PRN Sesar Phillips DO      cyanocobalamin  1,000 mcg Oral Daily Clare Brennan, PA-C      donepezil  5 mg Oral HS VAL Muñiz      haloperidol lactate  5 mg Intramuscular Q4H PRN Max 4/day Myrna Caldwell MD      hydrOXYzine HCL  25 mg Oral Q6H PRN Max 4/day Myrna Caldwell MD      hydrOXYzine HCL  50 mg Oral Q6H PRN Max 4/day Myrna Caldwell MD      lisinopril  10 mg Oral Daily Clare Brennan, PA-C      menthol-zinc oxide   Topical BID Clare Brennan, PA-C      nicotine polacrilex  4 mg Oral Q2H PRN Myrna Caldwell MD      nystatin   Topical BID Clare Brennan, PA-C      polyethylene glycol  17 g Oral Daily PRN Royce Bailon MD      pravastatin  40 mg Oral Daily With Dinner MAN Douglas-C      propranolol  5 mg Oral Q8H PRN Myrna Caldwell MD      risperiDONE  0.25 mg Oral Q4H PRN Max 6/day Myrna Caldwell MD      risperiDONE  0.5 mg Oral Q4H PRN Max 3/day  Myrna Caldwell MD      risperiDONE  1 mg Oral Q2H PRN Max 3/day Myrna Caldwell MD      senna-docusate sodium  1 tablet Oral HS [facility] MD Uvaldo      sertraline  100 mg Oral Daily Renard Keyes MD      sodium chloride  2 spray Each Nare Q1H PRN Clare Brennan PA-C      traZODone  50 mg Oral Q6H PRN Max 3/day Myrna Caldwell MD       Risks / Benefits of Treatment:    Risks, benefits, and possible side effects of medications explained to patient and patient verbalizes understanding and agreement for treatment.    Counseling / Coordination of Care:    Patient's progress discussed with staff in treatment team meeting.  Medication changes discussed with patient.  Medication education provided to patient.  Educated on importance of medication and treatment compliance.  Reassurance and supportive therapy provided.  Group attendance encouraged.    Diego Chua PA-C 12/18/24

## 2024-12-18 NOTE — NURSING NOTE
"Patient visible in milieu, pleasant and cooperative in interaction. Social with staff and peers. Patient endorses high anxiety, periods of tremors noted at breakfast due to anxiety. Per patient, \"I don't know where I'm supposed to be\". Difficulty with decision making, support provided. Denies depression, SI/HI, hallucinations. Remains medication compliant and on 15\" checks for safety and behaviors.  "

## 2024-12-18 NOTE — PROGRESS NOTES
"Progress Note - Luba Barajas 89 y.o. female MRN: [id number]    Unit/Bed#: -01 Encounter: [id number]        Subjective:   Patient seen and examined at bedside after reviewing the chart and discussing the case with the caring staff.      Patient examined at bedside.  Patient denies any acute symptoms.     Patient is a possible discharge on Friday, 12/20/2024.    Physical Exam   Vitals: Blood pressure 143/68, pulse 74, temperature 97.5 °F (36.4 °C), temperature source Temporal, resp. rate 16, height 5' 2\" (1.575 m), weight 63.7 kg (140 lb 6.4 oz), SpO2 97%.,Body mass index is 25.68 kg/m².  Constitutional: Patient in no acute distress.  HEENT: PERR, EOMI, MMM.  Cardiovascular: Normal rate and regular rhythm.  Pulmonary/Chest: Effort normal and breath sounds normal.  Abdomen: Soft, + BS, NT.    Assessment/Plan:  Luba Barajas is a(n) 89 y.o. female with MDD.     Cardiac with hx of HTN, HLD.  Continue lisinopril 10 mg daily and pravastatin 40 mg daily (rosuvastatin nonform).  Gait abnormality.  PT OT consulted.  Urinary frequency/incontinence.  Chronic and follows with GYN.  UA without evidence of UTI 11/26.  Tried medications for OAB in the past but stopped due to side effects.   Cutaneous candidiasis.  Apply nystatin powder twice daily.  Dysphagia.  SLP consulted.   Vitamin D/B12 deficiency.  Continue daily supplements.  Constipation.  Patient started on Senokot as 1 tablet daily along with MiraLAX as needed 11/28/24.  Dry nares.  Added saline nasal spray as needed 12/3.    The patient was discussed with Dr. Bailon and he is in agreement with the above note.  "

## 2024-12-18 NOTE — ASSESSMENT & PLAN NOTE
Continue Zoloft 100 mg daily  Increase Klonopin to 0.25mg PO TID for management of anxiety/restlessness.  Continue Abilify to 5 mg daily to help with depression and anxiety.

## 2024-12-18 NOTE — PROGRESS NOTES
12/18/24   Team Meeting   Meeting Type Daily Rounds   Team Members Present   Team Members Present Physician;Nurse;;Occupational Therapist   Physician Team Member Stephy CONWAY   Nursing Team Member Hannah LEÓN   Social Work Team Member Benjie URIOSTEGUI   OT Team Member Joshua LEWIS   Patient/Family Present   Patient Present No   Patient's Family Present No   201, pleasant, shaky, high anx, unsure of self, med adjustments, family mtg, requires a lot of reassurance/guidance, d/c Fri

## 2024-12-19 PROCEDURE — 99232 SBSQ HOSP IP/OBS MODERATE 35: CPT | Performed by: PSYCHIATRY & NEUROLOGY

## 2024-12-19 RX ADMIN — SENNOSIDES AND DOCUSATE SODIUM 1 TABLET: 8.6; 5 TABLET ORAL at 21:11

## 2024-12-19 RX ADMIN — LISINOPRIL 10 MG: 10 TABLET ORAL at 08:21

## 2024-12-19 RX ADMIN — CYANOCOBALAMIN TAB 500 MCG 1000 MCG: 500 TAB at 08:21

## 2024-12-19 RX ADMIN — NYSTATIN: 100000 POWDER TOPICAL at 08:23

## 2024-12-19 RX ADMIN — DONEPEZIL HYDROCHLORIDE 5 MG: 5 TABLET ORAL at 21:11

## 2024-12-19 RX ADMIN — CLONAZEPAM 0.25 MG: 0.5 TABLET ORAL at 08:21

## 2024-12-19 RX ADMIN — PRAVASTATIN SODIUM 40 MG: 40 TABLET ORAL at 16:56

## 2024-12-19 RX ADMIN — SERTRALINE 100 MG: 100 TABLET, FILM COATED ORAL at 08:21

## 2024-12-19 RX ADMIN — ARIPIPRAZOLE 5 MG: 5 TABLET ORAL at 08:22

## 2024-12-19 RX ADMIN — CLONAZEPAM 0.25 MG: 0.5 TABLET ORAL at 16:56

## 2024-12-19 RX ADMIN — CLONAZEPAM 0.25 MG: 0.5 TABLET ORAL at 21:10

## 2024-12-19 RX ADMIN — Medication 2000 UNITS: at 08:21

## 2024-12-19 RX ADMIN — NYSTATIN: 100000 POWDER TOPICAL at 21:13

## 2024-12-19 NOTE — PROGRESS NOTES
Progress Note - Behavioral Health   Name: Luba Barajas 89 y.o. female I MRN: [id number]   Unit/Bed#: [facility] 204-01 I Date of Admission: 11/26/2024   Date of Service: 12/19/2024 I Hospital Day: 23         Assessment & Plan  Generalized anxiety disorder    MDD (major depressive disorder), recurrent episode, severe (HCC)    HTN (hypertension)    Mild neurocognitive disorder        Recommended Treatment:     Planned medication and treatment changes:    All current medication has been reviewed.  Encourage group therapy, milieu therapy and occupational therapy.   Behavioral health checks for safety monitoring.  CT with current meds regimen.  Family meeting was held about pt's clinical improvement and discharge plan.  Monitor behavior and fall risk.    Current medications:  Current Facility-Administered Medications   Medication Dose Route Frequency Provider Last Rate    acetaminophen  650 mg Oral Q4H PRN Myrna Caldwell MD      acetaminophen  650 mg Oral Q4H PRN Myrna Caldwell MD      acetaminophen  975 mg Oral Q6H PRN Myrna Caldwell MD      aluminum-magnesium hydroxide-simethicone  30 mL Oral Q4H PRN Myrna Caldwell MD      ARIPiprazole  5 mg Oral Daily AGUILAR NevilleC      Cholecalciferol  2,000 Units Oral Daily Clare Brennan, PA-C      clonazePAM  0.25 mg Oral TID Renard Keyes MD      clonazePAM  0.5 mg Oral BID PRN Sesar Phillips DO      cyanocobalamin  1,000 mcg Oral Daily Clare L Anu, PA-C      donepezil  5 mg Oral HS VAL Muñiz      haloperidol lactate  5 mg Intramuscular Q4H PRN Max 4/day Myrna Caldwell MD      hydrOXYzine HCL  25 mg Oral Q6H PRN Max 4/day Myrna Caldwell MD      hydrOXYzine HCL  50 mg Oral Q6H PRN Max 4/day Myrna Caldwell MD      lisinopril  10 mg Oral Daily Clare L Dagnall, PA-C      menthol-zinc oxide   Topical BID Clare L Dagnall, PA-DAPHNE      nicotine polacrilex  4 mg Oral Q2H PRN Myrna Caldwell MD      nystatin   Topical BID Clare L  FABIAN Brennan      polyethylene glycol  17 g Oral Daily PRN Royce Bailon MD      pravastatin  40 mg Oral Daily With Dinner Clare Brennan PA-C      propranolol  5 mg Oral Q8H PRN Myrna Caldwell MD      risperiDONE  0.25 mg Oral Q4H PRN Max 6/day Myrna Caldwell MD      risperiDONE  0.5 mg Oral Q4H PRN Max 3/day Myrna Caldwell MD      risperiDONE  1 mg Oral Q2H PRN Max 3/day Myrna Caldwell MD      senna-docusate sodium  1 tablet Oral HS Royce Bailon MD      sertraline  100 mg Oral Daily Renard Keyes MD      sodium chloride  2 spray Each Nare Q1H PRN Clare Brennan PA-C      traZODone  50 mg Oral Q6H PRN Max 3/day Myrna Caldwell MD         Risks / Benefits of Treatment:    Risks, benefits, and possible side effects of medications explained to patient and patient verbalizes understanding and agreement for treatment.    Subjective:    Behavior over the last 24 hours: unchanged.     Luba was seen for continuing care. She reports feeling less anxious this morning and has been interacting with selective peers appropriately. She ruminates and worries frequently but states she made some progress and open to discuss about returning to her AL soon. Denies SI or wish to die, hallucinations presently. She is alert and oriented by two but has very limited insight into her cognitive function decline. She has been compliant with medication and denies any current side effects. Staff reports some participation in groups and milieu.    Sleep: improving  Appetite: fair  Medication side effects: denies   ROS: all other systems are negative except chronic back pain    Mental Status Evaluation:    Appearance:  age appropriate, casually dressed   Behavior:  more calm, responds to redirection   Speech:  normal rate and volume   Mood:  anxious mild   Affect:  mood-congruent   Thought Process:  negative thinking   Associations: intact associations   Thought Content:  no overt delusions, ruminations   Perceptual  Disturbances: denies auditory hallucinations when asked   Risk Potential: Suicidal ideation - None at present  Homicidal ideation - None at present  Potential for aggression - Not at present   Sensorium:  oriented to person, place, and time/date   Memory:  recent and remote memory grossly intact   Consciousness:  alert and awake   Attention/Concentration: attention span and concentration are age appropriate   Insight:  limited   Judgment: fair   Gait/Station: uses walker   Motor Activity: no abnormal movements     Vital signs in last 24 hours:    Temp:  [97 °F (36.1 °C)-97.5 °F (36.4 °C)] 97.1 °F (36.2 °C)  HR:  [78-82] 78  BP: (115-183)/(63-75) 183/75  Resp:  [18] 18  SpO2:  [94 %-95 %] 94 %  O2 Device: None (Room air)         Laboratory results: I have personally reviewed all pertinent laboratory/tests results    Most Recent Labs:   Lab Results   Component Value Date    WBC 5.00 11/28/2024    RBC 4.16 11/28/2024    HGB 12.0 11/28/2024    HCT 37.6 11/28/2024     11/28/2024    RDW 14.3 11/28/2024    NEUTROABS 3.70 11/28/2024    SODIUM 140 12/05/2024    K 3.9 12/05/2024     12/05/2024    CO2 26 12/05/2024    BUN 18 12/05/2024    CREATININE 0.78 12/05/2024    GLUC 115 12/05/2024    CALCIUM 9.7 12/05/2024    AST 12 (L) 11/28/2024    ALT 8 11/28/2024    ALKPHOS 59 11/28/2024    TP 6.0 (L) 11/28/2024    ALB 3.7 11/28/2024    TBILI 0.74 11/28/2024    CHOLESTEROL 135 11/28/2024    HDL 58 11/28/2024    TRIG 72 11/28/2024    LDLCALC 63 11/28/2024    NONHDLC 77 11/28/2024    [id number] 0.934 11/26/2024    SYPHILISAB Non-reactive 11/28/2024       Counseling / Coordination of Care:    Patient's progress discussed with staff in treatment team meeting.  Medication changes reviewed with nursing staff.  Supportive therapy provided to patient.  Group attendance encouraged.    Renard Keyes MD 12/19/24

## 2024-12-19 NOTE — PROGRESS NOTES
12/19/24    Team Meeting   Meeting Type Daily Rounds   Team Members Present   Team Members Present Physician;Nurse;Occupational Therapist;;Other (Discipline and Name)   Physician Team Member Stephy Chen MD   Nursing Team Member Hannah LEÓN   Social Work Team Member Benjie URIOSTEGUI   OT Team Member Joshua LEWSI   Other (Discipline and Name) Tiara PharmD   Patient/Family Present   Patient Present No   Patient's Family Present No   201, ambulating with walker, less anx, pleasant, med compliant, klonopin increased d/c Mon to monitor pt over the weekend

## 2024-12-19 NOTE — PROGRESS NOTES
"Progress Note - Luba Barajas 89 y.o. female MRN: [id number]    Unit/Bed#: -01 Encounter: [id number]        Subjective:   Patient seen and examined at bedside after reviewing the chart and discussing the case with the caring staff.      Patient examined at bedside.  Patient denies any acute symptoms.     Patient is a possible discharge on Monday, 12/23/2024.    Physical Exam   Vitals: Blood pressure (!) 183/75, pulse 78, temperature (!) 97.1 °F (36.2 °C), temperature source Temporal, resp. rate 18, height 5' 2\" (1.575 m), weight 63.7 kg (140 lb 6.4 oz), SpO2 94%.,Body mass index is 25.68 kg/m².  Constitutional: Patient in no acute distress.  HEENT: PERR, EOMI, MMM.  Cardiovascular: Normal rate and regular rhythm.  Pulmonary/Chest: Effort normal and breath sounds normal.  Abdomen: Soft, + BS, NT.    Assessment/Plan:  Luba Barajas is a(n) 89 y.o. female with MDD.     Cardiac with hx of HTN, HLD.  Continue lisinopril 10 mg daily and pravastatin 40 mg daily (rosuvastatin nonform).  Gait abnormality.  PT OT consulted.  Urinary frequency/incontinence.  Chronic and follows with GYN.  UA without evidence of UTI 11/26.  Tried medications for OAB in the past but stopped due to side effects.   Cutaneous candidiasis.  Apply nystatin powder twice daily.  Dysphagia.  SLP consulted.   Vitamin D/B12 deficiency.  Continue daily supplements.  Constipation.  Patient started on Senokot as 1 tablet daily along with MiraLAX as needed 11/28/24.  Dry nares.  Added saline nasal spray as needed 12/3.    The patient was discussed with Dr. Bailon and he is in agreement with the above note.  "

## 2024-12-19 NOTE — NURSING NOTE
Patient tearful and shaking this morning due to her high levels of anxiety. She discussed this morning feeling that she is too anxious to go home next week. She needs frequent reassurance. She attends select groups and socializes well with peers. No complaints of pain. Compliant with medications.

## 2024-12-19 NOTE — PLAN OF CARE
Problem: Ineffective Coping  Goal: Understands least restrictive measures  Description: Interventions:  - Utilize least restrictive behavior  Outcome: Progressing     Problem: Depression  Goal: Refrain from self-neglect  Outcome: Progressing

## 2024-12-19 NOTE — PROGRESS NOTES
12/19/24 1352   Activity/Group Checklist   Group Admission/Discharge   Attendance Attended   Attendance Duration (min) 0-15   Interactions Interacted appropriately   Affect/Mood Appropriate   Goals Achieved Identified feelings;Identified triggers;Identified relapse prevention strategies;Discussed safety plan;Discussed coping strategies;Discussed discharge plans;Identified resources and support systems;Able to listen to others;Able to reflect/comment on own behavior;Able to engage in interactions;Able to manage/cope with feelings;Able to self-disclose;Able to recieve feedback;Able to experience relief/decrease in symptoms     Patient agreeable to meet and complete safety/relapse prevention plan with [facility].  Patient information from forms can be found in media.

## 2024-12-20 PROCEDURE — 99232 SBSQ HOSP IP/OBS MODERATE 35: CPT

## 2024-12-20 RX ORDER — NYSTATIN 100000 [USP'U]/G
1 POWDER TOPICAL 2 TIMES DAILY
Qty: 60 G | Refills: 0 | Status: SHIPPED | OUTPATIENT
Start: 2024-12-20

## 2024-12-20 RX ORDER — AMOXICILLIN 250 MG
1 CAPSULE ORAL
Qty: 30 TABLET | Refills: 0 | Status: SHIPPED | OUTPATIENT
Start: 2024-12-20

## 2024-12-20 RX ORDER — LISINOPRIL 10 MG/1
10 TABLET ORAL DAILY
Qty: 30 TABLET | Refills: 0 | Status: SHIPPED | OUTPATIENT
Start: 2024-12-20

## 2024-12-20 RX ORDER — SIMVASTATIN 20 MG
20 TABLET ORAL
Qty: 30 TABLET | Refills: 0 | Status: SHIPPED | OUTPATIENT
Start: 2024-12-20

## 2024-12-20 RX ADMIN — CYANOCOBALAMIN TAB 500 MCG 1000 MCG: 500 TAB at 08:14

## 2024-12-20 RX ADMIN — CLONAZEPAM 0.25 MG: 0.5 TABLET ORAL at 17:06

## 2024-12-20 RX ADMIN — CLONAZEPAM 0.25 MG: 0.5 TABLET ORAL at 08:13

## 2024-12-20 RX ADMIN — DONEPEZIL HYDROCHLORIDE 5 MG: 5 TABLET ORAL at 20:38

## 2024-12-20 RX ADMIN — NYSTATIN: 100000 POWDER TOPICAL at 17:06

## 2024-12-20 RX ADMIN — NYSTATIN: 100000 POWDER TOPICAL at 08:14

## 2024-12-20 RX ADMIN — CLONAZEPAM 0.25 MG: 0.5 TABLET ORAL at 20:38

## 2024-12-20 RX ADMIN — LISINOPRIL 10 MG: 10 TABLET ORAL at 08:13

## 2024-12-20 RX ADMIN — ARIPIPRAZOLE 5 MG: 5 TABLET ORAL at 08:13

## 2024-12-20 RX ADMIN — SERTRALINE 100 MG: 100 TABLET, FILM COATED ORAL at 08:13

## 2024-12-20 RX ADMIN — PRAVASTATIN SODIUM 40 MG: 40 TABLET ORAL at 17:06

## 2024-12-20 RX ADMIN — Medication 2000 UNITS: at 08:13

## 2024-12-20 NOTE — PROGRESS NOTES
Progress Note - Behavioral Health   Name: Luba Barajas 89 y.o. female I MRN: [id number]  Unit/Bed#: [facility]01 I Date of Admission: 11/26/2024   Date of Service: 12/20/2024 I Hospital Day: 24    Assessment & Plan  Generalized anxiety disorder  Continue Zoloft 100 mg daily  Increase Klonopin to 0.25mg PO TID for management of anxiety/restlessness.  Continue Abilify to 5 mg daily to help with depression and anxiety.  MDD (major depressive disorder), recurrent episode, severe (HCC)  Continue Zoloft 100 mg daily    HTN (hypertension)  Per medical  Mild neurocognitive disorder  Supportive care    Progress Toward Goals:   All current active medications have been reviewed  Encourage group therapy, milieu therapy and occupational therapy  Behavioral Health checks for safety monitoring  Continue treatment with group therapy, milieu therapy and occupational therapy  Requires continued inpatient treatment due to chronic illness and high risk of decompensation if discharged before long term stability is achieved    Recommended Treatment: Continue with group therapy, milieu therapy and occupational therapy.      Risks, benefits and possible side effects of Medications:   Risks, benefits, and possible side effects of medications explained to patient and patient verbalizes understanding.      History of Present Illness   Behavior over the last 24 hours:  unchanged  Sleep: fair  Appetite: fair  Medication side effects: No  ROS:  chronic back pain and all other systems are negative    Subjective: Luba was seen today for psychiatric follow-up.  Patient is cooperative, she is visible on the unit, social with peers.  Patient is ruminative in thought this morning, she continues to ruminate on her impending discharge.  Patient reassured.  She appears less anxious during interview.  Her mood is controlled on the unit with no recent behavioral outburst.  She denied any SI/HI/AVH.  She did not appear internally preoccupied.    Objective    Mental Status Evaluation:  Appearance:  age appropriate and casually dressed   Behavior:  cooperative   Speech:  normal pitch and normal volume   Mood:  Less anxious   Affect:  mood-congruent   Thought Process:  Negative and ruminative thoughts   Associations: intact associations   Thought Content:  No overt delusions   Perceptual Disturbances: Denied AVH, did not appear internally preoccupied   Risk Potential: Suicidal Ideations none at present  Homicidal Ideations none at present  Potential for Aggression No   Sensorium:  person, place, and time/date   Memory:  recent and remote memory grossly intact   Consciousness:  alert and awake    Attention: attention span and concentration were age appropriate   Insight:  limited   Judgment: fair   Gait/Station: Ambulates with a walker   Motor Activity: no abnormal movements     Medications: all current active meds have been reviewed.      Lab Results: I have reviewed the following results:  Most Recent Labs:   Lab Results   Component Value Date    WBC 5.00 11/28/2024    RBC 4.16 11/28/2024    HGB 12.0 11/28/2024    HCT 37.6 11/28/2024     11/28/2024    RDW 14.3 11/28/2024    NEUTROABS 3.70 11/28/2024    SODIUM 140 12/05/2024    K 3.9 12/05/2024     12/05/2024    CO2 26 12/05/2024    BUN 18 12/05/2024    CREATININE 0.78 12/05/2024    GLUC 115 12/05/2024    GLUF 115 (H) 12/05/2024    CALCIUM 9.7 12/05/2024    AST 12 (L) 11/28/2024    ALT 8 11/28/2024    ALKPHOS 59 11/28/2024    TP 6.0 (L) 11/28/2024    ALB 3.7 11/28/2024    TBILI 0.74 11/28/2024    CHOLESTEROL 135 11/28/2024    HDL 58 11/28/2024    TRIG 72 11/28/2024    LDLCALC 63 11/28/2024    NONHDLC 77 11/28/2024    [id number] 0.934 11/26/2024    RPR Non-Reactive 03/11/2016

## 2024-12-20 NOTE — PROGRESS NOTES
"Progress Note - Luba Barajas 89 y.o. female MRN: [id number]    Unit/Bed#: [facility]01 Encounter: [id number]        Subjective:   Patient seen and examined at bedside after reviewing the chart and discussing the case with the caring staff.      Patient examined at bedside.  Patient denies any acute symptoms.     Patient is a possible discharge on Monday, 12/23/2024.    Physical Exam   Vitals: Blood pressure 140/80, pulse 75, temperature (!) 97.2 °F (36.2 °C), temperature source Temporal, resp. rate 18, height 5' 2\" (1.575 m), weight 63.7 kg (140 lb 6.4 oz), SpO2 95%.,Body mass index is 25.68 kg/m².  Constitutional: Patient in no acute distress.  HEENT: PERR, EOMI, MMM.  Cardiovascular: Normal rate and regular rhythm.  Pulmonary/Chest: Effort normal and breath sounds normal.  Abdomen: Soft, + BS, NT.    Assessment/Plan:  Luba Barajas is a(n) 89 y.o. female with MDD.    Medical clearance.  Patient is medically cleared for discharge.  All scripts were sent for the patient.     Cardiac with hx of HTN, HLD.  Continue lisinopril 10 mg daily and pravastatin 40 mg daily (rosuvastatin nonform).  Gait abnormality.  PT OT consulted.  Urinary frequency/incontinence.  Chronic and follows with GYN.  UA without evidence of UTI 11/26.  Tried medications for OAB in the past but stopped due to side effects.   Cutaneous candidiasis.  Apply nystatin powder twice daily.  Dysphagia.  SLP consulted.   Vitamin D/B12 deficiency.  Continue daily supplements.  Constipation.  Patient started on Senokot as 1 tablet daily along with MiraLAX as needed 11/28/24.  Dry nares.  Added saline nasal spray as needed 12/3.    The patient was discussed with Dr. Bailon and he is in agreement with the above note.  "

## 2024-12-20 NOTE — PLAN OF CARE
Problem: Ineffective Coping  Goal: Understands least restrictive measures  Description: Interventions:  - Utilize least restrictive behavior  Outcome: Progressing     Problem: Potential for Falls  Goal: Patient will remain free of falls  Description: Safety Plan- High Fall Risk    The Patient:  - Is identified on the bed board as a high fall risk: Yes   - Has yellow socks and band applied: Yes   - Requires this level of observation: Increased Rounding  - Requires staff assistance with showering  - Is on a toileting schedule: No  - Uses the following assistive device: Rolling Walker  - Requires alarm(s) on bed  - Has a call bell within reach: Yes   - Has a tap bell in place: na  - Requires a bedside commode and/or urinal: No  - Has been considered for appropriate room and bed placement: Yes   - Has been reported to treatment team as a high risk for falls: Yes      Outcome: Progressing

## 2024-12-20 NOTE — SOCIAL WORK
Cm, pt, provider spoke with pt son Ron [phone] via phone call.  Reviewed pt progress, Ron expressed concern with pt returning home on a Friday due to lack of structure/staff on weekends. Ron was grateful for pt progress and asked if cm contact pt son Jese to coordinate d/c. Pt in agreement to stay until Monday and states she feels more comfortable due to worrying that the staff that know her best wont be present to help her transition back. Pt states that the med adjustments appear to making her less anxious. All in agreement to d/c plan and supports. Ron expressed gratitude for the care and call ended mutually.

## 2024-12-20 NOTE — NURSING NOTE
Patient slept uninterrupted throughout the night without signs or symptoms of distress. Non labored breathing observed. Continuous q15 minute rounding and safety checks ongoing.

## 2024-12-20 NOTE — PROGRESS NOTES
12/20/24    Team Meeting   Meeting Type Daily Rounds   Team Members Present   Team Members Present Physician;;Nurse;;Occupational Therapist   Physician Team Member Stephy NEAL   Nursing Team Member Shilo LEÓN   Social Work Team Member Benjie URIOSTEGUI   OT Team Member Joshua LEWIS   Patient/Family Present   Patient Present No   Patient's Family Present No   201, d/c Mon home to [facility] after monitor med adjustment over the weekend, less anxious, visible, eating, slept, ambulating with walker, endorses anx about returning home,

## 2024-12-20 NOTE — NURSING NOTE
"Patient visible in milieu, pleasant and  cooperative in interaction, social with staff, limited interaction with peers. Patient with increased anxiety, trembling noted, unable to state why she is anxious. Requires frequent reassurance, redirection, and prompts. Denies depression, SI/HI, hallucinations. Remains medication compliant and on 15\" checks for safety and behaviors.  "

## 2024-12-20 NOTE — NURSING NOTE
Patient intermittently visible this evening. Patient is endorsing moderate anxiety and depression related to discharge and not feeling ready. Patient compliant with scheduled medications this evening; no PRNs given. Continuous q15 minute rounding and safety checks ongoing.

## 2024-12-21 PROCEDURE — 99232 SBSQ HOSP IP/OBS MODERATE 35: CPT | Performed by: STUDENT IN AN ORGANIZED HEALTH CARE EDUCATION/TRAINING PROGRAM

## 2024-12-21 RX ADMIN — CLONAZEPAM 0.25 MG: 0.5 TABLET ORAL at 08:54

## 2024-12-21 RX ADMIN — NYSTATIN 1 APPLICATION: 100000 POWDER TOPICAL at 18:08

## 2024-12-21 RX ADMIN — ANORECTAL OINTMENT: 15.7; .44; 24; 20.6 OINTMENT TOPICAL at 08:56

## 2024-12-21 RX ADMIN — Medication 2000 UNITS: at 08:53

## 2024-12-21 RX ADMIN — NYSTATIN: 100000 POWDER TOPICAL at 08:57

## 2024-12-21 RX ADMIN — LISINOPRIL 10 MG: 10 TABLET ORAL at 08:54

## 2024-12-21 RX ADMIN — PRAVASTATIN SODIUM 40 MG: 40 TABLET ORAL at 15:35

## 2024-12-21 RX ADMIN — CLONAZEPAM 0.25 MG: 0.5 TABLET ORAL at 15:35

## 2024-12-21 RX ADMIN — ARIPIPRAZOLE 5 MG: 5 TABLET ORAL at 08:54

## 2024-12-21 RX ADMIN — ANORECTAL OINTMENT 1 APPLICATION: 15.7; .44; 24; 20.6 OINTMENT TOPICAL at 18:08

## 2024-12-21 RX ADMIN — SERTRALINE 100 MG: 100 TABLET, FILM COATED ORAL at 08:54

## 2024-12-21 RX ADMIN — CYANOCOBALAMIN TAB 500 MCG 1000 MCG: 500 TAB at 08:54

## 2024-12-21 NOTE — ASSESSMENT & PLAN NOTE
Continue Zoloft 100 mg daily  Continue Klonopin to 0.25mg PO TID for management of anxiety/restlessness.  Continue Abilify to 5 mg daily to help with depression and anxiety.

## 2024-12-21 NOTE — PROGRESS NOTES
"Progress Note - Luba Barajas 89 y.o. female MRN: [id number]    Unit/Bed#: -01 Encounter: [id number]        Subjective:   Patient seen and examined at bedside after reviewing the chart and discussing the case with the caring staff.      Patient examined at bedside.  Patient denies any acute symptoms.     Patient is a possible discharge on Monday, 12/23/2024.    Physical Exam   Vitals: Blood pressure 150/67, pulse 75, temperature 97.8 °F (36.6 °C), temperature source Temporal, resp. rate 16, height 5' 2\" (1.575 m), weight 64.6 kg (142 lb 6.4 oz), SpO2 92%.,Body mass index is 26.05 kg/m².  Constitutional: Patient in no acute distress.  HEENT: PERR, EOMI, MMM.  Cardiovascular: Normal rate and regular rhythm.  Pulmonary/Chest: Effort normal and breath sounds normal.  Abdomen: Soft, + BS, NT.    Assessment/Plan:  Luba Barajas is a(n) 89 y.o. female with MDD.    Medical clearance.  Patient is medically cleared for discharge.  All scripts were sent for the patient.     Cardiac with hx of HTN, HLD.  Continue lisinopril 10 mg daily and pravastatin 40 mg daily (rosuvastatin nonform).  Gait abnormality.  PT OT consulted.  Urinary frequency/incontinence.  Chronic and follows with GYN.  UA without evidence of UTI 11/26.  Tried medications for OAB in the past but stopped due to side effects.   Cutaneous candidiasis.  Apply nystatin powder twice daily.  Dysphagia.  SLP consulted.   Vitamin D/B12 deficiency.  Continue daily supplements.  Constipation.  Patient started on Senokot as 1 tablet daily along with MiraLAX as needed 11/28/24.  Dry nares.  Added saline nasal spray as needed 12/3.    The patient was discussed with Dr. Bailon and he is in agreement with the above note.  "

## 2024-12-21 NOTE — NURSING NOTE
Patient withdrawn to room this evening. Moderate confusion about time of day and medications. Patient endorses high anxiety. Patient refused scheduled Senokot this evening but compliant with all other medications. No PRNs given. She is able to make her needs known. Fall precautions maintained. Continuous q15 minute rounding and safety checks in place.

## 2024-12-21 NOTE — NURSING NOTE
Pt visible on unit and social with select peers. Pt is calm, cooperative, and pleasantly confused. Pt presents as anxious with a tremor. Pt ambulates with a walker. Pt endorses high anxiety but denies depression, anxiety, SI/HI/AVH. Pt repetitively asked if she was going to be okay. Pt was reassured she is in a safe environment. Pt able to accept counseling and was able to be redirected. Continuous monitoring maintained.

## 2024-12-22 PROBLEM — F32.9 MAJOR DEPRESSIVE DISORDER: Status: RESOLVED | Noted: 2024-12-22 | Resolved: 2024-12-22

## 2024-12-22 PROBLEM — F32.9 MAJOR DEPRESSIVE DISORDER, SINGLE EPISODE, UNSPECIFIED: Status: RESOLVED | Noted: 2024-12-22 | Resolved: 2024-12-22

## 2024-12-22 PROBLEM — F32.9 MAJOR DEPRESSIVE DISORDER: Status: ACTIVE | Noted: 2024-12-22

## 2024-12-22 PROBLEM — F33.2 MDD (MAJOR DEPRESSIVE DISORDER), RECURRENT EPISODE, SEVERE (HCC): Status: RESOLVED | Noted: 2024-11-27 | Resolved: 2024-12-22

## 2024-12-22 PROBLEM — F32.9 MAJOR DEPRESSIVE DISORDER, SINGLE EPISODE, UNSPECIFIED: Status: ACTIVE | Noted: 2024-12-22

## 2024-12-22 PROCEDURE — 99232 SBSQ HOSP IP/OBS MODERATE 35: CPT | Performed by: STUDENT IN AN ORGANIZED HEALTH CARE EDUCATION/TRAINING PROGRAM

## 2024-12-22 RX ORDER — ARIPIPRAZOLE 5 MG/1
5 TABLET ORAL DAILY
Qty: 30 TABLET | Refills: 0 | Status: SHIPPED | OUTPATIENT
Start: 2024-12-23

## 2024-12-22 RX ORDER — SERTRALINE HYDROCHLORIDE 100 MG/1
100 TABLET, FILM COATED ORAL DAILY
Qty: 30 TABLET | Refills: 0 | Status: SHIPPED | OUTPATIENT
Start: 2024-12-23

## 2024-12-22 RX ORDER — CLONAZEPAM 0.5 MG/1
0.25 TABLET ORAL 3 TIMES DAILY
Qty: 15 TABLET | Refills: 0 | Status: SHIPPED | OUTPATIENT
Start: 2024-12-22 | End: 2025-01-01

## 2024-12-22 RX ORDER — DONEPEZIL HYDROCHLORIDE 5 MG/1
5 TABLET, FILM COATED ORAL
Qty: 30 TABLET | Refills: 0 | Status: SHIPPED | OUTPATIENT
Start: 2024-12-22

## 2024-12-22 RX ADMIN — CYANOCOBALAMIN TAB 500 MCG 1000 MCG: 500 TAB at 08:54

## 2024-12-22 RX ADMIN — Medication 2000 UNITS: at 08:54

## 2024-12-22 RX ADMIN — ARIPIPRAZOLE 5 MG: 5 TABLET ORAL at 08:54

## 2024-12-22 RX ADMIN — PRAVASTATIN SODIUM 40 MG: 40 TABLET ORAL at 17:32

## 2024-12-22 RX ADMIN — CLONAZEPAM 0.25 MG: 0.5 TABLET ORAL at 21:16

## 2024-12-22 RX ADMIN — NYSTATIN: 100000 POWDER TOPICAL at 17:33

## 2024-12-22 RX ADMIN — LISINOPRIL 10 MG: 10 TABLET ORAL at 08:54

## 2024-12-22 RX ADMIN — DONEPEZIL HYDROCHLORIDE 5 MG: 5 TABLET ORAL at 21:15

## 2024-12-22 RX ADMIN — CLONAZEPAM 0.25 MG: 0.5 TABLET ORAL at 17:32

## 2024-12-22 RX ADMIN — NYSTATIN: 100000 POWDER TOPICAL at 08:56

## 2024-12-22 RX ADMIN — CLONAZEPAM 0.25 MG: 0.5 TABLET ORAL at 08:55

## 2024-12-22 RX ADMIN — SERTRALINE 100 MG: 100 TABLET, FILM COATED ORAL at 08:54

## 2024-12-22 NOTE — PROGRESS NOTES
"Progress Note - Luba Barajas 89 y.o. female MRN: [id number]    Unit/Bed#: -01 Encounter: [id number]        Subjective:   Patient seen and examined at bedside after reviewing the chart and discussing the case with the caring staff.      Patient examined at bedside.  Patient denies any acute symptoms.     Patient is a possible discharge on Monday, 12/23/2024.    Physical Exam   Vitals: Blood pressure 120/68, pulse 72, temperature 97.5 °F (36.4 °C), temperature source Temporal, resp. rate 18, height 5' 2\" (1.575 m), weight 64.6 kg (142 lb 6.4 oz), SpO2 96%.,Body mass index is 26.05 kg/m².  Constitutional: Patient in no acute distress.  HEENT: PERR, EOMI, MMM.  Cardiovascular: Normal rate and regular rhythm.  Pulmonary/Chest: Effort normal and breath sounds normal.  Abdomen: Soft, + BS, NT.    Assessment/Plan:  Luba Barajas is a(n) 89 y.o. female with MDD.    Medical clearance.  Patient is medically cleared for discharge.  All scripts were sent for the patient.     Cardiac with hx of HTN, HLD.  Continue lisinopril 10 mg daily and pravastatin 40 mg daily (rosuvastatin nonform).  Gait abnormality.  PT OT consulted.  Urinary frequency/incontinence.  Chronic and follows with GYN.  UA without evidence of UTI 11/26.  Tried medications for OAB in the past but stopped due to side effects.   Cutaneous candidiasis.  Apply nystatin powder twice daily.  Dysphagia.  SLP consulted.   Vitamin D/B12 deficiency.  Continue daily supplements.  Constipation.  Patient started on Senokot as 1 tablet daily along with MiraLAX as needed 11/28/24.  Dry nares.  Added saline nasal spray as needed 12/3.    The patient was discussed with Dr. Bailon and he is in agreement with the above note.  "

## 2024-12-22 NOTE — BH TRANSITION RECORD
Contact Information: If you have any questions, concerns, pended studies, tests and/or procedures, or emergencies regarding your inpatient behavioral health visit. Please contact [facility] [phone] and ask to speak to a , nurse or physician. A contact is available 24 hours/ 7 days a week at this number.     Summary of Procedures Performed During your Stay:  Below is a list of major procedures performed during your hospital stay and a summary of results:  - Cardiac Procedures/Studies: EKG.   Sinus rhythm with sinus arrhythmia with occasional Premature ventricular complexes   QTC Interval 436    Pending Studies (From admission, onward)      None          Please follow up on the above pending studies with your PCP and/or referring provider.

## 2024-12-22 NOTE — PROGRESS NOTES
Progress Note - Behavioral Health   Name: Lbua Barajas 89 y.o. female I MRN: [id number]  Unit/Bed#: [facility] 204-01 I Date of Admission: 11/26/2024   Date of Service: 12/22/2024 I Hospital Day: 26        Assessment & Plan  HTN (hypertension)  Per medical  Mild neurocognitive disorder  Supportive care  YANCY (generalized anxiety disorder)  Continue Zoloft 100 mg daily  Continue Klonopin to 0.25mg PO TID for management of anxiety/restlessness.  Continue Abilify to 5 mg daily to help with depression and anxiety.    All current active medications have been reviewed  Encourage group therapy, milieu therapy and occupational therapy  Possible discharge in 1 or 2 days if continues to improve    Progress Toward Goals: improving progressively, participates in milieu therapy, a little anxious today about discharge, but is otherwise making progress   Current medications:  Current Facility-Administered Medications   Medication Dose Route Frequency Provider Last Rate    acetaminophen  650 mg Oral Q4H PRN Myrna Caldwell MD      acetaminophen  650 mg Oral Q4H PRN Myrna Caldwell MD      acetaminophen  975 mg Oral Q6H PRN Myrna Caldwell MD      aluminum-magnesium hydroxide-simethicone  30 mL Oral Q4H PRN Myrna Caldwell MD      ARIPiprazole  5 mg Oral Daily Griffin Nunez PA-C      Cholecalciferol  2,000 Units Oral Daily Clare Brennan PA-C      clonazePAM  0.25 mg Oral TID Renard Keyes MD      clonazePAM  0.5 mg Oral BID PRN Sesar Phillips DO      cyanocobalamin  1,000 mcg Oral Daily Clare Brennan PA-C      donepezil  5 mg Oral HS VAL Muñiz      haloperidol lactate  5 mg Intramuscular Q4H PRN Max 4/day Myrna Caldwell MD      hydrOXYzine HCL  25 mg Oral Q6H PRN Max 4/day Myrna Caldwell MD      hydrOXYzine HCL  50 mg Oral Q6H PRN Max 4/day Myrna Caldwell MD      lisinopril  10 mg Oral Daily Clare Brennan PA-C      menthol-zinc oxide   Topical BID Clare Brennan PA-C      nicotine  "polacrilex  4 mg Oral Q2H PRN Myrna Caldwell MD      nystatin   Topical BID Clare Brennan PA-C      polyethylene glycol  17 g Oral Daily PRN Royce Bailon MD      pravastatin  40 mg Oral Daily With Dinner Clare Brennan PA-C      propranolol  5 mg Oral Q8H PRN Myrna Caldwell MD      risperiDONE  0.25 mg Oral Q4H PRN Max 6/day Myrna Caldwell MD      risperiDONE  0.5 mg Oral Q4H PRN Max 3/day Myrna Caldwell MD      risperiDONE  1 mg Oral Q2H PRN Max 3/day Myrna Caldwell MD      senna-docusate sodium  1 tablet Oral HS Royce Bailon MD      sertraline  100 mg Oral Daily Renard Keyes MD      sodium chloride  2 spray Each Nare Q1H PRN Clare Brennan PA-C      traZODone  50 mg Oral Q6H PRN Max 3/day Myrna Caldwell MD             Risks / Benefits of Treatment:    Risks, benefits, and possible side effects of medications explained to patient and patient verbalizes understanding and agreement for treatment.    Subjective:    Behavior over the last 24 hours: unchanged.     During interview today, patient states she is feeling \"nervous\", as she is nervous about discharge.  She states that she has been taking her medications, feels they are helping somewhat.  States she is nervous that when she gets to the nursing home, she will continue to be anxious there.  She states she is nervous they will ask \"a lot of questions \"when she goes there.  Worked on improving her mindset, improving her motivation for discharge.      Sleep: normal, improved  Appetite: normal  Medication side effects: No   ROS: no complaints    Mental Status Evaluation:    Appearance:  age appropriate, casually dressed   Behavior:  normal, pleasant, cooperative   Speech:  normal rate and volume   Mood:  improved, minimally anxious   Affect:  normal range and intensity, appropriate   Thought Process:  organized, logical, coherent, goal directed, linear   Associations: intact associations   Thought Content:  no overt delusions "   Perceptual Disturbances: none   Risk Potential: Suicidal ideation - None at present  Homicidal ideation - None at present  Potential for aggression - No   Sensorium:  oriented to person, place, and time/date   Memory:  recent and remote memory grossly intact   Consciousness:  alert and awake   Attention/Concentration: attention span and concentration are age appropriate   Insight:  fair   Judgment: fair   Gait/Station: uses walker   Motor Activity: no abnormal movements     Vital signs in last 24 hours:    Temp:  [97.5 °F (36.4 °C)] 97.5 °F (36.4 °C)  HR:  [72-80] 72  BP: (120-138)/(66-68) 120/68  Resp:  [16-18] 18  SpO2:  [92 %-96 %] 96 %  O2 Device: None (Room air)         Laboratory results: I have personally reviewed all pertinent laboratory/tests results    Results from the past 24 hours: No results found for this or any previous visit (from the past 24 hours).      Counseling / Coordination of Care:    Total floor / unit time spent today 30 minutes. Greater than 50% of total time was spent with the patient and / or family counseling and / or coordination of care. A description of counseling / coordination of care:  Patient's progress discussed with staff in treatment team meeting.  Coping with anxiety reviewed with patient.  Reoriented to reality and reassured.    Administrative Statements   I have spent a total time of 30 minutes in caring for this patient on the day of the visit/encounter including Prognosis, Impressions, Counseling / Coordination of care, Documenting in the medical record, and Obtaining or reviewing history  . Topics discussed with the patient / family include symptom assessment and management, medication review, and psychosocial support.    Alex Campbell DO 12/22/24

## 2024-12-22 NOTE — DISCHARGE SUMMARY
Discharge Summary - Behavioral Health   Name: Luba Barajas 89 y.o. female I MRN: [id number]  Unit/Bed#: [facility]-01 I Date of Admission: 11/26/2024   Date of Service: 12/22/2024 I Hospital Day: 26    Discharge Summary - Behavioral Health   Luba Barajas 89 y.o. female MRN: [id number]  Unit/Bed#: [facility] 204-01 Encounter: [id number]     Admission Date: 11/26/2024         Discharge Date: 12/23/2024    Attending Psychiatrist: Renard Keyes MD    Reason for Admission/HPI: Major depressive disorder, single episode, unspecified [F32.9]  Major depressive disorder [F32.9]    According to H&P of Dr. Keyes    Patient is a 89 y.o. female presented with a history of depression, anxiety, and Panic Disorder who was admitted to the inpatient psychiatric unit on a voluntary 201 commitment basis due to depression, anxiety, and unstable mood. Patient was brought to ED by family member from assisted living due to significant worsening of mood, anxiety panic-like attack, tearfulness for the past 2 months.  Symptoms prior to admission included depression, hopelessness, poor concentration, poor appetite, anxiety attacks, and memory difficulties. Onset of symptoms was gradual starting few months ago with progressively worsening course since that time. Stressors preceding admission included health issues, medical problems, chronic pain, chronic anxiety, and ongoing anxiety.   On initial evaluation after admission to the inpatient psychiatric unit Luba presents anxious, depressed but able to answer question in goal directed way.  She reports feeling very anxious, tearful, helpless at time and ruminating about not feeling safe being by herself.  She also admits difficulty adjusting to recent transition from independent living to assisted living. She denies any suicidal ideation or wish to die but rather experiencing difficulty with her multiple medical conditions, chronic pain and high anxiety. She reports she has not been able to ambulate like  she used to due to panic attack and weakness in her lower legs.  Patient reports she was hospitalized several times in behavioral health unit and  as per record she was hospitalized at [facility] most recently after multiple presentation in the ED. She has been prescribed Klonopin and Paxil for several years. Denies any alcohol use or illicit drug use. Patient agreed to be compliant with medication and treatment plan. No acute focal neurological deficit or change in mental status noted for examination.    Hospital Course: The patient was admitted to the inpatient psychiatric unit and started on every 7 minutes precautions. During the hospitalization the patient was attending individual therapy, group therapy, milieu therapy and occupational therapy.    Psychiatric medications were titrated over the hospital stay. To address depressive symptoms, anxiety symptoms, and cognitive difficulties the patient was started on antidepressant Zoloft, antipsychotic medication Abilify, anxiolytic medication Klonopin, and cognitve enhancer Aricept. Medication doses were titrated during the hospital course. Prior to beginning of treatment medications risks and benefits and possible side effects including risk of parkinsonian symptoms, Tardive Dyskinesia and metabolic syndrome related to treatment with antipsychotic medications, risk of cardiovascular events in elderly related to treatment with antipsychotic medications, risk of suicidality and serotonin syndrome related to treatment with antidepressants, risks of misuse, abuse or dependence, sedation and respiratory depression related to treatment with benzodiazepine medications, and risk of impaired next-day mental alertness, complex sleep-related behavior and dependence related to treatment with hypnotic medications were reviewed with the patient. The patient verbalized understanding and agreement for treatment.     Patient's symptoms improved gradually over the hospital  course. At the end of treatment the patient was doing well. Mood was stable at the time of discharge. The patient denied suicidal ideation, intent or plan at the time of discharge and denied homicidal ideation, intent or plan at the time of discharge. There was no overt psychosis at the time of discharge. Sleep and appetite were improved. The patient was tolerating medications and was not reporting any significant side effects at the time of discharge.    Since the patient was doing well at the end of the hospitalization, treatment team felt that the patient could be safely discharged to outpatient care.     The outpatient follow up with  [facility]  was arranged by the unit  upon discharge.    Mental Status at time of Discharge:     Appearance:  age appropriate and casually dressed   Behavior:  Calm, cooperative   Speech:  normal pitch and normal volume   Mood:  Minimally anxious   Affect:  mood-congruent   Thought Process:  concrete   Thought Content:  No overt delusions   Perceptual Disturbances: Denied AVH, did not appear internally preoccupied   Risk Potential: none   Sensorium:  person, place, and time/date   Cognition:  recent and remote memory grossly intact   Consciousness:  alert and awake    Attention: attention span and concentration were age appropriate   Insight:  fair   Judgment: fair   Gait/Station: Seated in a chair   Motor Activity: no abnormal movements     Admission Diagnosis:Major depressive disorder, single episode, unspecified [F32.9]  Major depressive disorder [F32.9]    Discharge Diagnosis:   Principal Problem (Resolved):    MDD (major depressive disorder), recurrent episode, severe (HCC)  Active Problems:    HTN (hypertension)    Mild neurocognitive disorder  Resolved Problems:    Generalized anxiety disorder    Major depressive disorder, single episode, unspecified    Major depressive disorder        Lab results:  Admission on 11/26/2024   Component Date Value    Sodium  11/28/2024 138     Potassium 11/28/2024 4.0     Chloride 11/28/2024 105     CO2 11/28/2024 28     ANION GAP 11/28/2024 5     BUN 11/28/2024 21     Creatinine 11/28/2024 0.74     Glucose 11/28/2024 98     Calcium 11/28/2024 9.4     AST 11/28/2024 12 (L)     ALT 11/28/2024 8     Alkaline Phosphatase 11/28/2024 59     Total Protein 11/28/2024 6.0 (L)     Albumin 11/28/2024 3.7     Total Bilirubin 11/28/2024 0.74     eGFR 11/28/2024 73     WBC 11/28/2024 5.00     RBC 11/28/2024 4.16     Hemoglobin 11/28/2024 12.0     Hematocrit 11/28/2024 37.6     MCV 11/28/2024 90     MCH 11/28/2024 28.8     MCHC 11/28/2024 31.9     RDW 11/28/2024 14.3     MPV 11/28/2024 10.1     Platelets 11/28/2024 253     nRBC 11/28/2024 0     Segmented % 11/28/2024 74     Immature Grans % 11/28/2024 0     Lymphocytes % 11/28/2024 13 (L)     Monocytes % 11/28/2024 9     Eosinophils Relative 11/28/2024 3     Basophils Relative 11/28/2024 1     Absolute Neutrophils 11/28/2024 3.70     Absolute Immature Grans 11/28/2024 0.01     Absolute Lymphocytes 11/28/2024 0.64     Absolute Monocytes 11/28/2024 0.44     Eosinophils Absolute 11/28/2024 0.15     Basophils Absolute 11/28/2024 0.06     Vitamin B-12 11/28/2024 429     Folate 11/28/2024 13.5     Vit D, 25-Hydroxy 11/28/2024 37.1     Cholesterol 11/28/2024 135     Triglycerides 11/28/2024 72     HDL, Direct 11/28/2024 58     LDL Calculated 11/28/2024 63     Non-HDL-Chol (CHOL-HDL) 11/28/2024 77     Syphilis Total Antibody 11/28/2024 Non-reactive     Ventricular Rate 11/29/2024 79     Atrial Rate 11/29/2024 79     MA Interval 11/29/2024 170     QRSD Interval 11/29/2024 84     QT Interval 11/29/2024 380     QTC Interval 11/29/2024 436     P Axis 11/29/2024 71     QRS Axis 11/29/2024 -39     T Wave [facility] 11/29/2024 18     Sodium 12/05/2024 140     Potassium 12/05/2024 3.9     Chloride 12/05/2024 108     CO2 12/05/2024 26     ANION GAP 12/05/2024 6     BUN 12/05/2024 18     Creatinine 12/05/2024 0.78      Glucose 12/05/2024 115     Glucose, Fasting 12/05/2024 115 (H)     Calcium 12/05/2024 9.7     eGFR 12/05/2024 68     MRSA Culture Only 12/06/2024 No Methicillin Resistant Staphlyococcus aureus (MRSA) isolated        Discharge Medications:  Current Discharge Medication List        START taking these medications    Details   ARIPiprazole (ABILIFY) 5 mg tablet Take 1 tablet (5 mg total) by mouth daily  Qty: 30 tablet, Refills: 0    Associated Diagnoses: Major depressive disorder, single episode, unspecified      donepezil (ARICEPT) 5 mg tablet Take 1 tablet (5 mg total) by mouth daily at bedtime  Qty: 30 tablet, Refills: 0    Associated Diagnoses: Mild neurocognitive disorder      senna-docusate sodium (SENOKOT S) 8.6-50 mg per tablet Take 1 tablet by mouth daily at bedtime  Qty: 30 tablet, Refills: 0    Associated Diagnoses: Intertrigo      sertraline (ZOLOFT) 100 mg tablet Take 1 tablet (100 mg total) by mouth daily  Qty: 30 tablet, Refills: 0    Associated Diagnoses: Major depressive disorder, single episode, unspecified              Current Discharge Medication List        STOP taking these medications       PARoxetine (PAXIL) 20 mg tablet Comments:   Reason for Stopping:         hydrochlorothiazide (MICROZIDE) 12.5 mg capsule Comments:   Reason for Stopping:         Mirabegron ER (Myrbetriq) 25 MG TB24 Comments:   Reason for Stopping:         oxybutynin (DITROPAN-XL) 10 MG 24 hr tablet Comments:   Reason for Stopping:         Probiotic Product (PROBIOTIC PO) Comments:   Reason for Stopping:                Current Discharge Medication List        CONTINUE these medications which have CHANGED    Details   Cholecalciferol (VITAMIN D3) 1,000 units tablet Take 2 tablets (2,000 Units total) by mouth daily  Qty: 60 tablet, Refills: 0    Associated Diagnoses: Vitamin D deficiency      clonazePAM (KlonoPIN) 0.5 mg tablet Take 0.5 tablets (0.25 mg total) by mouth 3 (three) times a day for 10 days  Qty: 15 tablet, Refills: 0     Associated Diagnoses: Anxiety      Cyanocobalamin 1000 MCG CAPS Take 1 capsule (1,000 mcg total) by mouth daily  Qty: 30 capsule, Refills: 0    Associated Diagnoses: Vitamin B12 deficiency      lisinopril (ZESTRIL) 10 mg tablet Take 1 tablet (10 mg total) by mouth daily  Qty: 30 tablet, Refills: 0    Associated Diagnoses: HTN (hypertension)      nystatin (MYCOSTATIN) powder Apply 1 Application topically 2 (two) times a day  Qty: 60 g, Refills: 0    Associated Diagnoses: Constipation      simvastatin (ZOCOR) 20 mg tablet Take 1 tablet (20 mg total) by mouth daily at bedtime  Qty: 30 tablet, Refills: 0    Associated Diagnoses: HLD (hyperlipidemia)              Current Discharge Medication List           Discharge instructions/Information to patient and family:   See after visit summary for information provided to patient and family.      Provisions for Follow-Up Care:  See after visit summary for information related to follow-up care and any pertinent home health orders.      Discharge Statement     I spent 30 minutes discharging the patient. This time was spent on the day of discharge. I had direct contact with the patient on the day of discharge.     Additional documentation is required if more than 30 minutes were spent on discharge:    I reviewed with Luba importance of compliance with medications and outpatient treatment after discharge.  I discussed the medication regimen and possible side effects of the medications with Luba prior to discharge. At the time of discharge she was tolerating psychiatric medications.  I discussed outpatient follow up with Luba.  I reviewed with Luba crisis plan and safety plan upon discharge.

## 2024-12-22 NOTE — NUTRITION
12/22/24 1515   Biochemical Data,Medical Tests, and Procedures   Biochemical Data/Medical Tests/Procedures Lab values reviewed;Meds reviewed   Labs (Comment) No new labs   Meds (Comment) klonopin, haldol, atarax, zestril, pravachol, desyrel   Nutrition-Focused Physical Exam   Nutrition-Focused Physical Exam Findings No edema documented;RN skin assessment reviewed   Medical-Related Concerns PMH reviewed   Adequacy of Intake   Nutrition Modality PO   Feeding Route   PO Independent   Current PO Intake   Current Diet Order Regular, chopped meats, small portions thin liquids   Current Meal Intake %   Estimated calorie intake compared to estimated need Nutrient needs met.   PES Statement   Problem Continue previous diagnosis   Recommendations/Interventions   Malnutrition/BMI Present No  (does not meet criteria)   Summary Regular, chopped meats, small portions thin liquids. No nutrition supplements. Meal completions %. 12/21 142#, BMI=26.05; 10# weight loss from admision 11/26 152#  however trending up since 12/7 135#. Medications reviewed. No new labs. Skin intact. Reports good appetite.   Interventions/Recommendations Continue current diet order   Education Assessment   Education Education not indicated at this time   Patient Nutrition Goals   Goal Avoid weight loss;Meet PO needs   Goal Status Met;Extended   Timeframe to complete goal by next f/u   Nutrition Complexity Risk   Nutrition complexity level Low risk   Follow up date 01/03/25

## 2024-12-22 NOTE — NURSING NOTE
Pt was withdrawn to room this evening. Pt endorsed moderate anxiety, denied all other psych symptoms. Pt was less anxious and confused than last week. Pt stated she felt improved from yesterday. Pt was pleasant, cooperative, and med compliant. Will CTM. Q15 minute pt safety checks ongoing.

## 2024-12-22 NOTE — NURSING NOTE
Patient has been more visible on the unit and less anxious. Patient has had no episodes of tremor/tearfulness. Patient has been more social with others. Patient endorsed moderate anxiety and depression but stated that she feels better. Patient is able to make needs known and does not have any needs at this time. Patient denied having any pain. Patients skin has been unremarkable and BLEPS were completed/unremarkable, see MAR. Continuous safety monitoring in place.

## 2024-12-23 VITALS
RESPIRATION RATE: 18 BRPM | HEIGHT: 62 IN | HEART RATE: 82 BPM | DIASTOLIC BLOOD PRESSURE: 65 MMHG | BODY MASS INDEX: 26.2 KG/M2 | SYSTOLIC BLOOD PRESSURE: 141 MMHG | TEMPERATURE: 97.7 F | WEIGHT: 142.4 LBS | OXYGEN SATURATION: 94 %

## 2024-12-23 PROCEDURE — 99238 HOSP IP/OBS DSCHRG MGMT 30/<: CPT

## 2024-12-23 RX ADMIN — ARIPIPRAZOLE 5 MG: 5 TABLET ORAL at 09:11

## 2024-12-23 RX ADMIN — CYANOCOBALAMIN TAB 500 MCG 1000 MCG: 500 TAB at 09:11

## 2024-12-23 RX ADMIN — LISINOPRIL 10 MG: 10 TABLET ORAL at 09:11

## 2024-12-23 RX ADMIN — Medication 2000 UNITS: at 09:11

## 2024-12-23 RX ADMIN — SERTRALINE 100 MG: 100 TABLET, FILM COATED ORAL at 09:11

## 2024-12-23 RX ADMIN — CLONAZEPAM 0.25 MG: 0.5 TABLET ORAL at 09:28

## 2024-12-23 NOTE — NURSING NOTE
Patient discharged from unit at 1115 with pick of son to take back to the [facility]. Wasted 0.25 mg of klonopin with nurse  Instructed to put note in nurse's note since patient was already taken out of the system before wasting medication.

## 2024-12-23 NOTE — PROGRESS NOTES
"Progress Note - Luba Barajas 89 y.o. female MRN: [id number]    Unit/Bed#: -01 Encounter: [id number]        Subjective:   Patient seen and examined at bedside after reviewing the chart and discussing the case with the caring staff.      Patient examined at bedside.  Patient denies any acute symptoms.     Patient is being discharged today, Monday, 12/23/2024.    Physical Exam   Vitals: Blood pressure 141/65, pulse 82, temperature 97.7 °F (36.5 °C), temperature source Temporal, resp. rate 18, height 5' 2\" (1.575 m), weight 64.6 kg (142 lb 6.4 oz), SpO2 94%.,Body mass index is 26.05 kg/m².  Constitutional: Patient in no acute distress.  HEENT: PERR, EOMI, MMM.  Cardiovascular: Normal rate and regular rhythm.  Pulmonary/Chest: Effort normal and breath sounds normal.  Abdomen: Soft, + BS, NT.    Assessment/Plan:  Luba Barajas is a(n) 89 y.o. female with MDD.    Medical clearance.  Patient is medically cleared for discharge.  All scripts were sent for the patient.     Cardiac with hx of HTN, HLD.  Continue lisinopril 10 mg daily and pravastatin 40 mg daily (rosuvastatin nonform).  Gait abnormality.  PT OT consulted.  Urinary frequency/incontinence.  Chronic and follows with GYN.  UA without evidence of UTI 11/26.  Tried medications for OAB in the past but stopped due to side effects.   Cutaneous candidiasis.  Apply nystatin powder twice daily.  Dysphagia.  SLP consulted.   Vitamin D/B12 deficiency.  Continue daily supplements.  Constipation.  Patient started on Senokot as 1 tablet daily and MiraLAX as needed 11/28.  Dry nares.  Added saline nasal spray as needed 12/3.    The patient was discussed with Dr. Bailon and he is in agreement with the above note.  "

## 2024-12-23 NOTE — NURSING NOTE
Pt visible on unit and social with peers. Pt presents as calm, pleasant, and cooperative. Pt endorses mild depression and anxiety. Pt states her depression is r/t discharge and she was anxious until she had her pills. Continuous monitoring maintained.

## 2024-12-23 NOTE — PLAN OF CARE
Problem: Anxiety  Goal: Anxiety is at manageable level  Description: Interventions:  - Assess and monitor patient's anxiety level.   - Monitor for signs and symptoms (heart palpitations, chest pain, shortness of breath, headaches, nausea, feeling jumpy, restlessness, irritable, apprehensive).   - Collaborate with interdisciplinary team and initiate plan and interventions as ordered.  - [place] patient to unit/surroundings  - Explain treatment plan  - Encourage participation in care  - Encourage verbalization of concerns/fears  - Identify coping mechanisms  - Assist in developing anxiety-reducing skills  - Administer/offer alternative therapies  - Limit or eliminate stimulants  Outcome: Progressing

## 2024-12-23 NOTE — PROGRESS NOTES
12/23/24    Team Meeting   Meeting Type Daily Rounds   Team Members Present   Team Members Present Physician;Nurse;;Occupational Therapist   Physician Team Member Stephy NEAL   Nursing Team Member Mahin LEÓN   Social Work Team Member Benjie URIOSTEGUI   OT Team Member Joshua CTRS   Patient/Family Present   Patient Present No   Patient's Family Present No   D/c today [facility], [facility] improvemed

## 2024-12-23 NOTE — NURSING NOTE
Patient visible on unit with peers, medication compliant and cooperative. Patient has mild anxiety, no depression, denies SI/HI and hallucinations. Patient is set to discharge from unit at 1115. Continued care and safety rounds in progress.

## 2024-12-23 NOTE — NURSING NOTE
Assumed care of this patient from prior shift nurse at 2300.  Patient is currently in bed, appears to be sleeping.  No acute behaviors observed.  Continuous safety rounding in progress.

## 2024-12-26 ENCOUNTER — TELEPHONE (OUTPATIENT)
Age: 89
End: 2024-12-26

## 2024-12-26 NOTE — TELEPHONE ENCOUNTER
Patients insurance company called in for a pre auth-patient is not established at this time.    If Insurance company calls back, please advise the patient is not established with [facility].

## 2024-12-26 NOTE — TELEPHONE ENCOUNTER
Writer called [employer] back, to advise the patient is not established with [facility] at this time.     Writer did not have NPI number to provide to [employer].    If insurance company calls back, please advise no Pre-auth can be provided at this time.

## 2025-02-07 ENCOUNTER — APPOINTMENT (EMERGENCY)
Dept: CT IMAGING | Facility: HOSPITAL | Age: 89
End: 2025-02-07
Payer: COMMERCIAL

## 2025-02-07 ENCOUNTER — HOSPITAL ENCOUNTER (EMERGENCY)
Facility: HOSPITAL | Age: 89
Discharge: HOME | End: 2025-02-07
Attending: EMERGENCY MEDICINE
Payer: COMMERCIAL

## 2025-02-07 VITALS
HEART RATE: 72 BPM | SYSTOLIC BLOOD PRESSURE: 142 MMHG | RESPIRATION RATE: 18 BRPM | TEMPERATURE: 97.9 F | OXYGEN SATURATION: 97 % | DIASTOLIC BLOOD PRESSURE: 92 MMHG

## 2025-02-07 DIAGNOSIS — F41.9 ANXIETY: Primary | ICD-10-CM

## 2025-02-07 LAB
ANION GAP SERPL CALCULATED.3IONS-SCNC: 7 MMOL/L (ref 4–13)
BACTERIA UR QL AUTO: ABNORMAL /HPF
BASOPHILS # BLD AUTO: 0.06 THOUSANDS/ΜL (ref 0–0.1)
BASOPHILS NFR BLD AUTO: 1 % (ref 0–1)
BILIRUB UR QL STRIP: NEGATIVE
BUN SERPL-MCNC: 23 MG/DL (ref 5–25)
CALCIUM SERPL-MCNC: 9.8 MG/DL (ref 8.4–10.2)
CHLORIDE SERPL-SCNC: 108 MMOL/L (ref 96–108)
CLARITY UR: ABNORMAL
CO2 SERPL-SCNC: 26 MMOL/L (ref 21–32)
COLOR UR: YELLOW
CREAT SERPL-MCNC: 0.77 MG/DL (ref 0.6–1.3)
EOSINOPHIL # BLD AUTO: 0.05 THOUSAND/ΜL (ref 0–0.61)
EOSINOPHIL NFR BLD AUTO: 1 % (ref 0–6)
ERYTHROCYTE [DISTWIDTH] IN BLOOD BY AUTOMATED COUNT: 14.4 % (ref 11.6–15.1)
GFR SERPL CREATININE-BSD FRML MDRD: 69 ML/MIN/1.73SQ M
GLUCOSE SERPL-MCNC: 110 MG/DL (ref 65–140)
GLUCOSE UR STRIP-MCNC: NEGATIVE MG/DL
HCT VFR BLD AUTO: 36.9 % (ref 34.8–46.1)
HGB BLD-MCNC: 11.8 G/DL (ref 11.5–15.4)
HGB UR QL STRIP.AUTO: ABNORMAL
HYALINE CASTS #/AREA URNS LPF: ABNORMAL /LPF
IMM GRANULOCYTES # BLD AUTO: 0.01 THOUSAND/UL (ref 0–0.2)
IMM GRANULOCYTES NFR BLD AUTO: 0 % (ref 0–2)
KETONES UR STRIP-MCNC: NEGATIVE MG/DL
LEUKOCYTE ESTERASE UR QL STRIP: ABNORMAL
LYMPHOCYTES # BLD AUTO: 0.61 THOUSANDS/ΜL (ref 0.6–4.47)
LYMPHOCYTES NFR BLD AUTO: 9 % (ref 14–44)
MCH RBC QN AUTO: 26.9 PG (ref 26.8–34.3)
MCHC RBC AUTO-ENTMCNC: 32 G/DL (ref 31.4–37.4)
MCV RBC AUTO: 84 FL (ref 82–98)
MONOCYTES # BLD AUTO: 0.6 THOUSAND/ΜL (ref 0.17–1.22)
MONOCYTES NFR BLD AUTO: 9 % (ref 4–12)
MUCOUS THREADS UR QL AUTO: ABNORMAL
NEUTROPHILS # BLD AUTO: 5.77 THOUSANDS/ΜL (ref 1.85–7.62)
NEUTS SEG NFR BLD AUTO: 80 % (ref 43–75)
NITRITE UR QL STRIP: NEGATIVE
NON-SQ EPI CELLS URNS QL MICRO: ABNORMAL /HPF
NRBC BLD AUTO-RTO: 0 /100 WBCS
PH UR STRIP.AUTO: 6 [PH]
PLATELET # BLD AUTO: 256 THOUSANDS/UL (ref 149–390)
PMV BLD AUTO: 10.2 FL (ref 8.9–12.7)
POTASSIUM SERPL-SCNC: 3.9 MMOL/L (ref 3.5–5.3)
PROT UR STRIP-MCNC: ABNORMAL MG/DL
RBC # BLD AUTO: 4.39 MILLION/UL (ref 3.81–5.12)
RBC #/AREA URNS AUTO: ABNORMAL /HPF
SODIUM SERPL-SCNC: 141 MMOL/L (ref 135–147)
SP GR UR STRIP.AUTO: 1.02 (ref 1–1.03)
TSH SERPL DL<=0.05 MIU/L-ACNC: 1.59 UIU/ML (ref 0.45–4.5)
UROBILINOGEN UR STRIP-ACNC: <2 MG/DL
WBC # BLD AUTO: 7.1 THOUSAND/UL (ref 4.31–10.16)
WBC #/AREA URNS AUTO: ABNORMAL /HPF

## 2025-02-07 PROCEDURE — 81001 URINALYSIS AUTO W/SCOPE: CPT | Performed by: EMERGENCY MEDICINE

## 2025-02-07 PROCEDURE — 36415 COLL VENOUS BLD VENIPUNCTURE: CPT | Performed by: EMERGENCY MEDICINE

## 2025-02-07 PROCEDURE — 74177 CT ABD & PELVIS W/CONTRAST: CPT

## 2025-02-07 PROCEDURE — NC001 PR NO CHARGE: Performed by: EMERGENCY MEDICINE

## 2025-02-07 PROCEDURE — 99284 EMERGENCY DEPT VISIT MOD MDM: CPT

## 2025-02-07 PROCEDURE — 80048 BASIC METABOLIC PNL TOTAL CA: CPT | Performed by: EMERGENCY MEDICINE

## 2025-02-07 PROCEDURE — 84443 ASSAY THYROID STIM HORMONE: CPT | Performed by: EMERGENCY MEDICINE

## 2025-02-07 PROCEDURE — 93005 ELECTROCARDIOGRAM TRACING: CPT

## 2025-02-07 PROCEDURE — 99285 EMERGENCY DEPT VISIT HI MDM: CPT | Performed by: EMERGENCY MEDICINE

## 2025-02-07 PROCEDURE — 85025 COMPLETE CBC W/AUTO DIFF WBC: CPT | Performed by: EMERGENCY MEDICINE

## 2025-02-07 RX ORDER — CLONAZEPAM 0.5 MG/1
0.5 TABLET ORAL ONCE
Status: COMPLETED | OUTPATIENT
Start: 2025-02-07 | End: 2025-02-07

## 2025-02-07 RX ADMIN — IOHEXOL 85 ML: 350 INJECTION, SOLUTION INTRAVENOUS at 20:16

## 2025-02-07 RX ADMIN — CLONAZEPAM 0.5 MG: 0.5 TABLET ORAL at 17:18

## 2025-02-07 NOTE — ED PROVIDER NOTES
Time reflects when diagnosis was documented in both MDM as applicable and the Disposition within this note       Time User Action Codes Description Comment    2/7/2025 10:36 PM Levi Anderson Add [F41.9] Anxiety           ED Disposition       ED Disposition   Discharge    Condition   Stable    Date/Time   Fri Feb 7, 2025 10:36 PM    Comment   Luba Barajas discharge to home/self care.                   Assessment & Plan       Medical Decision Making  Female patient who presented to the emergency department for anxiety.  On physical examination, patient was noted to have tenderness to palpation to the LLQ with intermittent episodes of anxiousness given a certain topic at bedside.  Patient's labs showed no acute concerns at this time.  Patient CT abdomen pelvis showed diverticulosis with no findings to suggest diverticulitis as per radiology.  While in the emergency department, patient was given her Klonopin with symptomatic improvement.  Patient and her son was reassured of her symptoms and plan for discharge.  She was advised follow-up outpatient with PCP as well as psychiatrist.  She was instructed return the emergency department if her symptoms worsen.  Patient was agreeable to plan.    Amount and/or Complexity of Data Reviewed  Labs:  Decision-making details documented in ED Course.  Radiology: ordered. Decision-making details documented in ED Course.    Risk  Prescription drug management.        ED Course as of 02/09/25 1731   Fri Feb 07, 2025   1856 TSH 3RD GENERATON: 1.594   1956 Leukocytes, UA(!): Large   1956 Blood, UA(!): Small   2003 WBC, UA(!): 30-50   2235 CT abdomen pelvis with contrast  No acute findings in the abdomen or pelvis. No free air or free fluid.     Scattered colonic diverticulosis with no inflammatory changes present to suggest acute diverticulitis.         Medications   clonazePAM (KlonoPIN) tablet 0.5 mg (0.5 mg Oral Given 2/7/25 1718)   iohexol (OMNIPAQUE) 350 MG/ML injection (MULTI-DOSE)  85 mL (85 mL Intravenous Given 2/7/25 2016)       ED Risk Strat Scores                                              History of Present Illness       Chief Complaint   Patient presents with    Psychiatric Evaluation     Pt's son/POA reports pt has increased anxiety that has been getting progressively worse since September. Has been hospitalized since then for anxiety. GCS 15. Pt reports difficulty doing tasks is a trigger.        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; and on nose    Colon polyp     Depression     HTN (hypertension)     HTN (hypertension)     Hyperlipidemia     Osteopenia     Skin cancer     Squamous cell on Left cheek      Past Surgical History:   Procedure Laterality Date    COLONOSCOPY      FOOT SURGERY      toe repair right 2nd toe    JOINT REPLACEMENT Bilateral     TKR    SQUAMOUS CELL CARCINOMA EXCISION Left     Lesion removed from posterior wrist     TOTAL KNEE ARTHROPLASTY        Family History   Problem Relation Age of Onset    Depression Mother     Hypertension Mother     Breast cancer Mother     Diabetes Mother     Lymphoma Father       Social History     Tobacco Use    Smoking status: Never    Smokeless tobacco: Never   Vaping Use    Vaping status: Never Used   Substance Use Topics    Alcohol use: No    Drug use: No      E-Cigarette/Vaping    E-Cigarette Use Never User       E-Cigarette/Vaping Substances    Nicotine No     THC No     CBD No     Flavoring No     Other No     Unknown No       I have reviewed and agree with the history as documented.     89-year-old female with significant PMH including HTN, HLD, anxiety, and depression who presents to the emergency department with her son for anxiety.  As per son and patient at bedside, she has been having increasing anxiety throughout the day and is unable to do daily activities.  Patient gets so anxious that she is unable to eat or drink fluids.  She states she is  worried because if she has urinary incontinence she is unable to change herself without risk of her falling which makes her anxious.  Patient's medications were recently changed on Wednesday with her olanzapine being increased from 25 mg to 50 mg.  Patient also mentions she has been having constipation recently.  As per patient's son at bedside, she has otherwise been acting as her usual self without any other acute concerns. Denies chest pain, SOB, cough, abdominal pain, n/v/d, fever, chills, dizziness, lightheadedness, HA, dysuria, hematuria, hematochezia, or melena.             Review of Systems   Constitutional:  Negative for appetite change, chills, diaphoresis, fatigue and fever.   HENT:  Negative for congestion, ear pain, postnasal drip, rhinorrhea, sore throat and trouble swallowing.    Eyes:  Negative for pain and visual disturbance.   Respiratory:  Negative for cough and shortness of breath.    Cardiovascular:  Negative for chest pain and palpitations.   Gastrointestinal:  Negative for abdominal pain, constipation, diarrhea, nausea and vomiting.   Genitourinary:  Negative for decreased urine volume, dysuria and hematuria.   Musculoskeletal:  Negative for arthralgias and back pain.   Skin:  Negative for color change and rash.   Neurological:  Negative for dizziness, seizures, syncope, weakness, light-headedness and headaches.   Psychiatric/Behavioral:  The patient is nervous/anxious.    All other systems reviewed and are negative.          Objective       ED Triage Vitals [02/07/25 1618]   Temperature Pulse Blood Pressure Respirations SpO2 Patient Position - Orthostatic VS   97.9 °F (36.6 °C) 81 144/67 (!) 28 95 % Sitting      Temp Source Heart Rate Source BP Location FiO2 (%) Pain Score    Oral Monitor Right arm -- --      Vitals      Date and Time Temp Pulse SpO2 Resp BP Pain Score FACES Pain Rating User   02/07/25 2239 -- 72 97 % 18 142/92 -- -- KB   02/07/25 1715 -- -- -- 22 -- -- -- RLN   02/07/25 1618  97.9 °F (36.6 °C) 81 95 % 28 144/67 -- -- TP            Physical Exam  Vitals and nursing note reviewed.   Constitutional:       General: She is not in acute distress.     Appearance: Normal appearance. She is normal weight.   HENT:      Head: Normocephalic and atraumatic.      Right Ear: External ear normal.      Left Ear: External ear normal.      Nose: Nose normal.      Mouth/Throat:      Pharynx: Oropharynx is clear.   Eyes:      Conjunctiva/sclera: Conjunctivae normal.   Cardiovascular:      Rate and Rhythm: Normal rate and regular rhythm.      Pulses: Normal pulses.      Heart sounds: Normal heart sounds.      Comments: RRR with +S1 and S2, no murmurs appreciated on exam. Peripheral pulses intact.    Pulmonary:      Effort: Pulmonary effort is normal. No respiratory distress.      Breath sounds: Normal breath sounds. No wheezing, rhonchi or rales.      Comments: CTABL with no abnormal lung sounds such as wheezes or rales appreciated on exam.     Abdominal:      General: Abdomen is flat. Bowel sounds are normal. There is no distension.      Palpations: Abdomen is soft.      Tenderness: There is abdominal tenderness.      Comments: Tenderness to palpation of the LLQ.  Soft, normo-active bowel sounds. Without rigidity, guarding, or distension.     Musculoskeletal:         General: Normal range of motion.      Cervical back: Normal range of motion.      Right lower leg: No edema.      Left lower leg: No edema.   Skin:     General: Skin is warm and dry.   Neurological:      General: No focal deficit present.      Mental Status: She is alert and oriented to person, place, and time. Mental status is at baseline.      Comments: CN grossly intact on visualization. No focal neurologic deficits noted on exam.  5/5 strength in all extremities. Neurovascularly intact with normal sensation and motor function.       Psychiatric:      Comments: Patient will become anxious at bedside intermittently when talking about different  topics but was redirectable and otherwise comfortable.         Results Reviewed       Procedure Component Value Units Date/Time    Urine Microscopic [[id number]]  (Abnormal) Collected: 02/07/25 1936    Lab Status: Final result Specimen: Urine, Other Updated: 02/07/25 2000     RBC, UA 1-2 /hpf      WBC, UA 30-50 /hpf      Epithelial Cells Occasional /hpf      Bacteria, UA None Seen /hpf      MUCUS THREADS Occasional     Hyaline Casts, UA 0-3 /lpf     UA (URINE) with reflex to Scope [[id number]]  (Abnormal) Collected: 02/07/25 1936    Lab Status: Final result Specimen: Urine, Other Updated: 02/07/25 1954     Color, UA Yellow     Clarity, UA Turbid     Specific Gravity, UA 1.025     pH, UA 6.0     Leukocytes, UA Large     Nitrite, UA Negative     Protein, UA Trace mg/dl      Glucose, UA Negative mg/dl      Ketones, UA Negative mg/dl      Urobilinogen, UA <2.0 mg/dl      Bilirubin, UA Negative     Occult Blood, UA Small    TSH [[id number]]  (Normal) Collected: 02/07/25 1722    Lab Status: Final result Specimen: Blood from Arm, Left Updated: 02/07/25 1827     TSH 3RD GENERATON 1.594 uIU/mL     Basic metabolic panel [[id number]] Collected: 02/07/25 1722    Lab Status: Final result Specimen: Blood from Arm, Left Updated: 02/07/25 1749     Sodium 141 mmol/L      Potassium 3.9 mmol/L      Chloride 108 mmol/L      CO2 26 mmol/L      ANION GAP 7 mmol/L      BUN 23 mg/dL      Creatinine 0.77 mg/dL      Glucose 110 mg/dL      Calcium 9.8 mg/dL      eGFR 69 ml/min/1.73sq m     Narrative:      [employer] guidelines for Chronic Kidney Disease (CKD):     Stage 1 with normal or high GFR (GFR > 90 mL/min/1.73 square meters)    Stage 2 Mild CKD (GFR = 60-89 mL/min/1.73 square meters)    Stage 3A Moderate CKD (GFR = 45-59 mL/min/1.73 square meters)    Stage 3B Moderate CKD (GFR = 30-44 mL/min/1.73 square meters)    Stage 4 Severe CKD (GFR = 15-29 mL/min/1.73 square meters)    Stage 5 End Stage CKD (GFR <15  mL/min/1.73 square meters)  Note: GFR calculation is accurate only with a steady state creatinine    CBC and differential [[id number]]  (Abnormal) Collected: 02/07/25 1722    Lab Status: Final result Specimen: Blood from Arm, Left Updated: 02/07/25 1732     WBC 7.10 Thousand/uL      RBC 4.39 Million/uL      Hemoglobin 11.8 g/dL      Hematocrit 36.9 %      MCV 84 fL      MCH 26.9 pg      MCHC 32.0 g/dL      RDW 14.4 %      MPV 10.2 fL      Platelets 256 Thousands/uL      nRBC 0 /100 WBCs      Segmented % 80 %      Immature Grans % 0 %      Lymphocytes % 9 %      Monocytes % 9 %      Eosinophils Relative 1 %      Basophils Relative 1 %      Absolute Neutrophils 5.77 Thousands/µL      Absolute Immature Grans 0.01 Thousand/uL      Absolute Lymphocytes 0.61 Thousands/µL      Absolute Monocytes 0.60 Thousand/µL      Eosinophils Absolute 0.05 Thousand/µL      Basophils Absolute 0.06 Thousands/µL             CT abdomen pelvis with contrast   Final Interpretation by Javier Eric MD (02/07 2205)      No acute findings in the abdomen or pelvis. No free air or free fluid.      Scattered colonic diverticulosis with no inflammatory changes present to suggest acute diverticulitis.         Workstation performed: [id number]             ECG 12 Lead Documentation Only    Date/Time: 2/7/2025 5:32 PM    Performed by: Levi Anderson MD  Authorized by: Levi Anderson MD    ECG reviewed by me, the ED Provider: yes    Patient location:  ED  Previous ECG:     Previous ECG:  Compared to current    Similarity:  No change  Interpretation:     Interpretation: abnormal    Rate:     ECG rate:  70    ECG rate assessment: normal    Rhythm:     Rhythm: sinus rhythm    Ectopy:     Ectopy: none    QRS:     QRS axis:  Left    QRS intervals:  Normal  Conduction:     Conduction: normal    ST segments:     ST segments:  Normal  T waves:     T waves: normal        ED Medication and Procedure Management   Prior to Admission Medications   Prescriptions Last  Dose Informant Patient Reported? Taking?   ARIPiprazole (ABILIFY) 5 mg tablet   No No   Sig: Take 1 tablet (5 mg total) by mouth daily   Cholecalciferol (VITAMIN D3) 1,000 units tablet   No No   Sig: Take 2 tablets (2,000 Units total) by mouth daily   Cyanocobalamin 1000 MCG CAPS   No No   Sig: Take 1 capsule (1,000 mcg total) by mouth daily   clonazePAM (KlonoPIN) 0.5 mg tablet   No No   Sig: Take 0.5 tablets (0.25 mg total) by mouth 3 (three) times a day for 10 days   donepezil (ARICEPT) 5 mg tablet   No No   Sig: Take 1 tablet (5 mg total) by mouth daily at bedtime   lisinopril (ZESTRIL) 10 mg tablet   No No   Sig: Take 1 tablet (10 mg total) by mouth daily   nystatin (MYCOSTATIN) powder   No No   Sig: Apply 1 Application topically 2 (two) times a day   senna-docusate sodium (SENOKOT S) 8.6-50 mg per tablet   No No   Sig: Take 1 tablet by mouth daily at bedtime   sertraline (ZOLOFT) 100 mg tablet   No No   Sig: Take 1 tablet (100 mg total) by mouth daily   simvastatin (ZOCOR) 20 mg tablet   No No   Sig: Take 1 tablet (20 mg total) by mouth daily at bedtime      Facility-Administered Medications: None     Discharge Medication List as of 2/7/2025 10:37 PM        CONTINUE these medications which have NOT CHANGED    Details   ARIPiprazole (ABILIFY) 5 mg tablet Take 1 tablet (5 mg total) by mouth daily, Starting Mon 12/23/2024, Normal      Cholecalciferol (VITAMIN D3) 1,000 units tablet Take 2 tablets (2,000 Units total) by mouth daily, Starting Sat 12/21/2024, Normal      clonazePAM (KlonoPIN) 0.5 mg tablet Take 0.5 tablets (0.25 mg total) by mouth 3 (three) times a day for 10 days, Starting Sun 12/22/2024, Until Wed 1/1/2025, Normal      Cyanocobalamin 1000 MCG CAPS Take 1 capsule (1,000 mcg total) by mouth daily, Starting Fri 12/20/2024, Normal      donepezil (ARICEPT) 5 mg tablet Take 1 tablet (5 mg total) by mouth daily at bedtime, Starting Sun 12/22/2024, Normal      lisinopril (ZESTRIL) 10 mg tablet Take 1  tablet (10 mg total) by mouth daily, Starting Fri 12/20/2024, Normal      nystatin (MYCOSTATIN) powder Apply 1 Application topically 2 (two) times a day, Starting Fri 12/20/2024, Normal      senna-docusate sodium (SENOKOT S) 8.6-50 mg per tablet Take 1 tablet by mouth daily at bedtime, Starting Fri 12/20/2024, Normal      sertraline (ZOLOFT) 100 mg tablet Take 1 tablet (100 mg total) by mouth daily, Starting Mon 12/23/2024, Normal      simvastatin (ZOCOR) 20 mg tablet Take 1 tablet (20 mg total) by mouth daily at bedtime, Starting Fri 12/20/2024, Normal           No discharge procedures on file.  ED SEPSIS DOCUMENTATION   Time reflects when diagnosis was documented in both MDM as applicable and the Disposition within this note       Time User Action Codes Description Comment    2/7/2025 10:36 PM Levi Anderson Add [F41.9] Anxiety                  Levi Anderson MD  02/09/25 7039

## 2025-02-07 NOTE — ED PROVIDER NOTES
Pt with several years of increasing anxiety --  2 prior psych admits-- last one in 11/24--  in assisted living- with increasing anxiety- I Ability  to function not eating- drinking  RECENTLY - NO FEVERS-  NO MEDICALLY SYMPTOMS- NO SI- CURRENTLY ALERTED AND AWARE WITH NORMAL EXAM --  NO SI-- PT IS INCONT  OF URINE AT BASELINE- NO URINARY COMPLAINTS-  GAVE HER DOSE OF HER CLONAZEPAM-- AND SENT LABS/ ECG- ALERTED PSYCH        Davon Albarran MD  02/07/25 7539

## 2025-02-08 LAB
ATRIAL RATE: 70 BPM
P AXIS: 61 DEGREES
PR INTERVAL: 178 MS
QRS AXIS: -41 DEGREES
QRSD INTERVAL: 82 MS
QT INTERVAL: 404 MS
QTC INTERVAL: 436 MS
T WAVE AXIS: 12 DEGREES
VENTRICULAR RATE: 70 BPM

## 2025-02-08 PROCEDURE — 93010 ELECTROCARDIOGRAM REPORT: CPT | Performed by: INTERNAL MEDICINE

## 2025-02-08 NOTE — ED ATTENDING ATTESTATION
2/7/2025  I, Brian Bonner MD, saw and evaluated the patient. I have discussed the patient with the resident/non-physician practitioner and agree with the resident's/non-physician practitioner's findings, Plan of Care, and MDM as documented in the resident's/non-physician practitioner's note, except where noted. All available labs and Radiology studies were reviewed.  I was present for key portions of any procedure(s) performed by the resident/non-physician practitioner and I was immediately available to provide assistance.       At this point I agree with the current assessment done in the Emergency Department.  I have conducted an independent evaluation of this patient a history and physical is as follows:    89-year-old female sent from assisted living facility for increased anxiety today.  Her son is here with her and notes that she has been anxious for many months, has been following with a psychiatrist and has been tried on numerous medications without improvement.  Today she was particularly anxious without a clear indication.  Son reports she has been having difficulty eating because of anxiety.  She is also drinking less because she has been incontinent of urine lately and does not want to have any accidents.  She feels very uncomfortable using her call bell in the room to call for assistance which is the reason why she is at the assisted living facility to begin with.  Her anxiety seems to stem from other people caring for her and her being bothersome to others.  No physical complaints at this time.  Resident did note some left lower abdominal tenderness on exam.  With the urinary incontinence and left lower abdominal tenderness we will check a UA, CT abdomen to rule out any other significant pathology.      ED Course  ED Course as of 02/07/25 2217 Fri Feb 07, 2025 2024 Had notable improvement after 0.5mg Klonopin tablet given in triage.  She typically takes 0.25 mg.  Still anxious at the time of  our evaluation but reports she feels better overall.   2024 I did recommend to the patient's son looking into providers that may prescribe cannabinoid compounds if traditional anxiolytics are not helpful.   2216 CT shows:    No acute findings in the abdomen or pelvis. No free air or free fluid.     Scattered colonic diverticulosis with no inflammatory changes present to suggest acute diverticulitis.     2216 Plan discharge back to her facility.  Would recommend trial of increasing her 0.25 mg Klonopin doses to 0.5 mg.         Critical Care Time  Procedures